# Patient Record
Sex: FEMALE | Race: WHITE | Employment: UNEMPLOYED | RURAL
[De-identification: names, ages, dates, MRNs, and addresses within clinical notes are randomized per-mention and may not be internally consistent; named-entity substitution may affect disease eponyms.]

---

## 2018-11-08 ENCOUNTER — HOSPITAL ENCOUNTER (EMERGENCY)
Facility: HOSPITAL | Age: 43
Discharge: HOME | End: 2018-11-08
Attending: FAMILY MEDICINE
Payer: COMMERCIAL

## 2018-11-08 VITALS
RESPIRATION RATE: 16 BRPM | SYSTOLIC BLOOD PRESSURE: 124 MMHG | OXYGEN SATURATION: 97 % | BODY MASS INDEX: 37.3 KG/M2 | HEART RATE: 114 BPM | WEIGHT: 190 LBS | HEIGHT: 60 IN | DIASTOLIC BLOOD PRESSURE: 78 MMHG | TEMPERATURE: 98.3 F

## 2018-11-08 DIAGNOSIS — K04.7 DENTAL ABSCESS: Primary | ICD-10-CM

## 2018-11-08 DIAGNOSIS — K02.9 DENTAL CARIES: ICD-10-CM

## 2018-11-08 PROCEDURE — 99282 EMERGENCY DEPT VISIT SF MDM: CPT

## 2018-11-08 PROCEDURE — 6370000000 HC RX 637 (ALT 250 FOR IP): Performed by: FAMILY MEDICINE

## 2018-11-08 RX ORDER — HYDROCODONE BITARTRATE AND ACETAMINOPHEN 5; 325 MG/1; MG/1
1 TABLET ORAL EVERY 4 HOURS PRN
Qty: 10 TABLET | Refills: 0 | Status: SHIPPED | OUTPATIENT
Start: 2018-11-08 | End: 2018-11-11

## 2018-11-08 RX ORDER — AMOXICILLIN 500 MG/1
500 CAPSULE ORAL 3 TIMES DAILY
Qty: 30 CAPSULE | Refills: 0 | Status: SHIPPED | OUTPATIENT
Start: 2018-11-08 | End: 2018-11-18

## 2018-11-08 RX ORDER — AMOXICILLIN 500 MG/1
500 CAPSULE ORAL ONCE
Status: COMPLETED | OUTPATIENT
Start: 2018-11-08 | End: 2018-11-08

## 2018-11-08 RX ORDER — HYDROCODONE BITARTRATE AND ACETAMINOPHEN 7.5; 325 MG/1; MG/1
1 TABLET ORAL ONCE
Status: COMPLETED | OUTPATIENT
Start: 2018-11-08 | End: 2018-11-08

## 2018-11-08 RX ORDER — IBUPROFEN 600 MG/1
600 TABLET ORAL EVERY 6 HOURS PRN
COMMUNITY
End: 2018-12-03

## 2018-11-08 RX ADMIN — HYDROCODONE BITARTRATE AND ACETAMINOPHEN 1 TABLET: 7.5; 325 TABLET ORAL at 19:50

## 2018-11-08 RX ADMIN — AMOXICILLIN 500 MG: 500 CAPSULE ORAL at 19:50

## 2018-11-08 ASSESSMENT — PAIN SCALES - GENERAL
PAINLEVEL_OUTOF10: 10

## 2018-11-08 ASSESSMENT — PAIN - FUNCTIONAL ASSESSMENT: PAIN_FUNCTIONAL_ASSESSMENT: 0-10

## 2018-11-08 ASSESSMENT — PAIN DESCRIPTION - LOCATION: LOCATION: MOUTH

## 2018-11-08 ASSESSMENT — PAIN DESCRIPTION - ORIENTATION: ORIENTATION: RIGHT

## 2018-11-08 ASSESSMENT — PAIN DESCRIPTION - PAIN TYPE: TYPE: ACUTE PAIN

## 2018-11-08 ASSESSMENT — PAIN DESCRIPTION - DESCRIPTORS: DESCRIPTORS: ACHING;THROBBING

## 2018-11-08 ASSESSMENT — ENCOUNTER SYMPTOMS: FACIAL SWELLING: 1

## 2018-11-09 NOTE — ED PROVIDER NOTES
7/19/1988    Smokeless tobacco: Never Used    Alcohol use No    Drug use: No    Sexual activity: Not Asked     Other Topics Concern    None     Social History Narrative    None       SCREENINGS             PHYSICAL EXAM    (up to 7 for level 4, 8 or more for level 5)     ED Triage Vitals [11/08/18 1857]   BP Temp Temp Source Pulse Resp SpO2 Height Weight   122/85 98.3 °F (36.8 °C) Oral 115 16 98 % 5' (1.524 m) 190 lb (86.2 kg)       Physical Exam   Constitutional: She is oriented to person, place, and time. She appears well-developed and well-nourished. HENT:   Head: Normocephalic and atraumatic. Pt with pan-dental caries. Pt with right canine deep dental decay into dentin with loss of tooth to gingiva with anterior edema and erythema, no drainage. Facial edema external to tooth decay to right maxilla. Eyes: Pupils are equal, round, and reactive to light. Conjunctivae and EOM are normal.   Neck: Neck supple. Cardiovascular: Regular rhythm and normal heart sounds. Pulmonary/Chest: Effort normal and breath sounds normal.   Musculoskeletal: Normal range of motion. Neurological: She is alert and oriented to person, place, and time. Skin: Skin is warm and dry. Psychiatric: She has a normal mood and affect. Her behavior is normal.   Nursing note and vitals reviewed.       DIAGNOSTIC RESULTS     EKG: All EKG's are interpreted by the Emergency Department Physician who either signs or Co-signs this chart in the absence of a cardiologist.    None    RADIOLOGY:   Non-plain film images such as CT, Ultrasound and MRI are read by the radiologist. Plain radiographic images are visualized andpreliminarily interpreted by the emergency physician with the below findings:    None    Interpretationper the Radiologist below, if available at the time of this note:    No orders to display         ED BEDSIDE ULTRASOUND:   Performed by ED Physician - none    LABS:  Labs Reviewed - No data to display    All other labs were within normal range or not returned as of this dictation. EMERGENCY DEPARTMENT COURSE and DIFFERENTIAL DIAGNOSIS/MDM:   Vitals:    Vitals:    11/08/18 1857   BP: 122/85   Pulse: 115   Resp: 16   Temp: 98.3 °F (36.8 °C)   TempSrc: Oral   SpO2: 98%   Weight: 190 lb (86.2 kg)   Height: 5' (1.524 m)           CRITICAL CARE TIME   Total Critical Care time was 0 minutes, excluding separatelyreportable procedures. There was a high probability ofclinically significant/life threatening deterioration in the patient's condition which required my urgent intervention. CONSULTS:  None    PROCEDURES:  None    PROGRESS NOTES:    Pt with dental decay and needs to get to a dentist. Told pt about looking online for dental insurance. Will give pain med and antibiotics now and give Rx for each upon discharge. Ekasper done. No Rx's on system. FINAL IMPRESSION      1. Dental abscess    2. Dental caries          DISPOSITION/PLAN   DISPOSITION Decision To Discharge 11/08/2018 07:41:24 PM      PATIENT REFERRED TO:    Pt to follow up with PCP or seek dental care if symptoms continue          DISCHARGE MEDICATIONS:  New Prescriptions    AMOXICILLIN (AMOXIL) 500 MG CAPSULE    Take 1 capsule by mouth 3 times daily for 10 days    HYDROCODONE-ACETAMINOPHEN (NORCO) 5-325 MG PER TABLET    Take 1 tablet by mouth every 4 hours as needed for Pain for up to 3 days. Intended supply: 3 days. Take lowest dose possible to manage pain.        (Please note that portions of this note were completed with a voice recognition program.  Efforts were made to edit the dictations but occasionallywords are mis-transcribed.)    Casimiro Primrose, DO (electronically signed)  Attending Emergency Physician          Casimiro Primrose, DO  11/08/18 1951

## 2018-12-03 ENCOUNTER — HOSPITAL ENCOUNTER (INPATIENT)
Facility: HOSPITAL | Age: 43
LOS: 4 days | Discharge: HOME | End: 2018-12-07
Attending: INTERNAL MEDICINE | Admitting: INTERNAL MEDICINE

## 2018-12-03 ENCOUNTER — HOSPITAL ENCOUNTER (EMERGENCY)
Facility: HOSPITAL | Age: 43
Discharge: ANOTHER HOSPITAL | End: 2018-12-03
Attending: EMERGENCY MEDICINE
Payer: COMMERCIAL

## 2018-12-03 ENCOUNTER — HOSPITAL ENCOUNTER (OUTPATIENT)
Facility: HOSPITAL | Age: 43
Setting detail: HOSPITAL OUTPATIENT SURGERY
Discharge: STILL ADMITTED OR ADMINISTRATIVE | End: 2018-12-03
Attending: INTERNAL MEDICINE | Admitting: INTERNAL MEDICINE

## 2018-12-03 ENCOUNTER — APPOINTMENT (OUTPATIENT)
Dept: GENERAL RADIOLOGY | Facility: HOSPITAL | Age: 43
End: 2018-12-03

## 2018-12-03 ENCOUNTER — APPOINTMENT (OUTPATIENT)
Dept: GENERAL RADIOLOGY | Facility: HOSPITAL | Age: 43
End: 2018-12-03
Payer: COMMERCIAL

## 2018-12-03 ENCOUNTER — APPOINTMENT (OUTPATIENT)
Dept: CT IMAGING | Facility: HOSPITAL | Age: 43
End: 2018-12-03
Payer: COMMERCIAL

## 2018-12-03 VITALS
OXYGEN SATURATION: 95 % | BODY MASS INDEX: 35.34 KG/M2 | RESPIRATION RATE: 30 BRPM | HEART RATE: 120 BPM | SYSTOLIC BLOOD PRESSURE: 129 MMHG | HEIGHT: 60 IN | WEIGHT: 180 LBS | DIASTOLIC BLOOD PRESSURE: 81 MMHG | TEMPERATURE: 97.3 F

## 2018-12-03 VITALS
OXYGEN SATURATION: 90 % | SYSTOLIC BLOOD PRESSURE: 123 MMHG | BODY MASS INDEX: 35.34 KG/M2 | WEIGHT: 180 LBS | HEART RATE: 123 BPM | DIASTOLIC BLOOD PRESSURE: 90 MMHG | RESPIRATION RATE: 18 BRPM | HEIGHT: 60 IN

## 2018-12-03 DIAGNOSIS — R00.0 WIDE-COMPLEX TACHYCARDIA: ICD-10-CM

## 2018-12-03 DIAGNOSIS — I21.02 STEMI INVOLVING LEFT ANTERIOR DESCENDING CORONARY ARTERY (HCC): ICD-10-CM

## 2018-12-03 DIAGNOSIS — R07.9 CHEST PAIN, UNSPECIFIED TYPE: Primary | ICD-10-CM

## 2018-12-03 DIAGNOSIS — I21.02 ACUTE ST ELEVATION MYOCARDIAL INFARCTION (STEMI) INVOLVING LEFT ANTERIOR DESCENDING (LAD) CORONARY ARTERY (HCC): Primary | ICD-10-CM

## 2018-12-03 DIAGNOSIS — F17.200 CURRENT SMOKER: ICD-10-CM

## 2018-12-03 PROBLEM — R91.8 PULMONARY NODULES: Status: ACTIVE | Noted: 2018-12-03

## 2018-12-03 PROBLEM — E66.812 CLASS 2 OBESITY IN ADULT: Status: ACTIVE | Noted: 2018-12-03

## 2018-12-03 PROBLEM — M54.50 LOW BACK PAIN: Status: ACTIVE | Noted: 2018-12-03

## 2018-12-03 PROBLEM — E03.9 HYPOTHYROIDISM: Status: ACTIVE | Noted: 2018-12-03

## 2018-12-03 PROBLEM — E66.9 CLASS 2 OBESITY IN ADULT: Status: ACTIVE | Noted: 2018-12-03

## 2018-12-03 PROBLEM — G47.33 OSA (OBSTRUCTIVE SLEEP APNEA): Status: ACTIVE | Noted: 2018-12-03

## 2018-12-03 LAB
A/G RATIO: 1.3 (ref 0.8–2)
ALBUMIN SERPL-MCNC: 4.37 G/DL (ref 3.2–4.8)
ALBUMIN SERPL-MCNC: 4.5 G/DL (ref 3.4–4.8)
ALBUMIN/GLOB SERPL: 1.7 G/DL (ref 1.5–2.5)
ALP BLD-CCNC: 94 U/L (ref 25–100)
ALP SERPL-CCNC: 91 U/L (ref 25–100)
ALT SERPL W P-5'-P-CCNC: 76 U/L (ref 7–40)
ALT SERPL-CCNC: 18 U/L (ref 4–36)
ANION GAP SERPL CALCULATED.3IONS-SCNC: 10 MMOL/L (ref 3–11)
ANION GAP SERPL CALCULATED.3IONS-SCNC: 10 MMOL/L (ref 3–11)
ANION GAP SERPL CALCULATED.3IONS-SCNC: 16 MMOL/L (ref 3–16)
AST SERPL-CCNC: 17 U/L (ref 8–33)
AST SERPL-CCNC: 292 U/L (ref 0–33)
B-HCG UR QL: NEGATIVE
BASOPHILS # BLD MANUAL: 0 10*3/MM3 (ref 0–0.2)
BASOPHILS ABSOLUTE: 0.1 K/UL (ref 0–0.1)
BASOPHILS NFR BLD AUTO: 0 % (ref 0–1)
BASOPHILS RELATIVE PERCENT: 0.4 %
BILIRUB SERPL-MCNC: 0.3 MG/DL (ref 0.3–1.2)
BILIRUB SERPL-MCNC: <0.2 MG/DL (ref 0.3–1.2)
BNP SERPL-MCNC: 42 PG/ML (ref 0–100)
BUN BLD-MCNC: 13 MG/DL (ref 9–23)
BUN BLD-MCNC: 13 MG/DL (ref 9–23)
BUN BLDV-MCNC: 9 MG/DL (ref 6–20)
BUN/CREAT SERPL: 16 (ref 7–25)
BUN/CREAT SERPL: 16 (ref 7–25)
CALCIUM SERPL-MCNC: 10 MG/DL (ref 8.5–10.5)
CALCIUM SPEC-SCNC: 8.7 MG/DL (ref 8.7–10.4)
CALCIUM SPEC-SCNC: 8.7 MG/DL (ref 8.7–10.4)
CHLORIDE BLD-SCNC: 106 MMOL/L (ref 98–107)
CHLORIDE SERPL-SCNC: 109 MMOL/L (ref 99–109)
CHLORIDE SERPL-SCNC: 109 MMOL/L (ref 99–109)
CO2 SERPL-SCNC: 16 MMOL/L (ref 20–31)
CO2 SERPL-SCNC: 16 MMOL/L (ref 20–31)
CO2: 17 MMOL/L (ref 20–30)
CREAT BLD-MCNC: 0.81 MG/DL (ref 0.6–1.3)
CREAT BLD-MCNC: 0.81 MG/DL (ref 0.6–1.3)
CREAT SERPL-MCNC: 0.8 MG/DL (ref 0.4–1.2)
D DIMER: 369 NG/ML DDU
D-LACTATE SERPL-SCNC: 3.9 MMOL/L (ref 0.5–2)
DEPRECATED RDW RBC AUTO: 48.4 FL (ref 37–54)
EOSINOPHIL # BLD MANUAL: 0 10*3/MM3 (ref 0.1–0.3)
EOSINOPHIL NFR BLD MANUAL: 0 % (ref 0–3)
EOSINOPHILS ABSOLUTE: 0.3 K/UL (ref 0–0.4)
EOSINOPHILS RELATIVE PERCENT: 1.5 %
ERYTHROCYTE [DISTWIDTH] IN BLOOD BY AUTOMATED COUNT: 14 % (ref 11.3–14.5)
GFR AFRICAN AMERICAN: >59
GFR NON-AFRICAN AMERICAN: >60
GFR SERPL CREATININE-BSD FRML MDRD: 76 ML/MIN/1.73
GFR SERPL CREATININE-BSD FRML MDRD: 76 ML/MIN/1.73
GLOBULIN UR ELPH-MCNC: 2.5 GM/DL
GLOBULIN: 3.4 G/DL
GLUCOSE BLD-MCNC: 147 MG/DL (ref 74–106)
GLUCOSE BLD-MCNC: 244 MG/DL (ref 70–100)
GLUCOSE BLD-MCNC: 244 MG/DL (ref 70–100)
GLUCOSE BLDC GLUCOMTR-MCNC: 232 MG/DL (ref 70–130)
HBA1C MFR BLD: 6.9 % (ref 4.8–5.6)
HCT VFR BLD AUTO: 44.4 % (ref 34.5–44)
HCT VFR BLD CALC: 44.1 % (ref 37–47)
HEMOGLOBIN: 14.5 G/DL (ref 11.5–16.5)
HGB BLD-MCNC: 14.6 G/DL (ref 11.5–15.5)
IMMATURE GRANULOCYTES #: 0.1 K/UL
IMMATURE GRANULOCYTES %: 0.4 % (ref 0–5)
LYMPHOCYTES # BLD MANUAL: 1.74 10*3/MM3 (ref 0.6–4.8)
LYMPHOCYTES ABSOLUTE: 5.5 K/UL (ref 1.5–4)
LYMPHOCYTES NFR BLD MANUAL: 4 % (ref 0–12)
LYMPHOCYTES NFR BLD MANUAL: 6 % (ref 24–44)
LYMPHOCYTES RELATIVE PERCENT: 33.6 %
MAGNESIUM SERPL-MCNC: 2.1 MG/DL (ref 1.3–2.7)
MAGNESIUM: 1.9 MG/DL (ref 1.7–2.4)
MCH RBC QN AUTO: 30.9 PG (ref 27–32)
MCH RBC QN AUTO: 31.2 PG (ref 27–31)
MCHC RBC AUTO-ENTMCNC: 32.9 G/DL (ref 31–35)
MCHC RBC AUTO-ENTMCNC: 32.9 G/DL (ref 32–36)
MCV RBC AUTO: 94 FL (ref 80–100)
MCV RBC AUTO: 94.9 FL (ref 80–99)
MONOCYTES # BLD AUTO: 1.16 10*3/MM3 (ref 0–1)
MONOCYTES ABSOLUTE: 1.2 K/UL (ref 0.2–0.8)
MONOCYTES RELATIVE PERCENT: 7.2 %
NEUTROPHILS # BLD AUTO: 26.06 10*3/MM3 (ref 1.5–8.3)
NEUTROPHILS ABSOLUTE: 9.3 K/UL (ref 2–7.5)
NEUTROPHILS NFR BLD MANUAL: 84 % (ref 41–71)
NEUTROPHILS RELATIVE PERCENT: 56.9 %
NEUTS BAND NFR BLD MANUAL: 6 % (ref 0–5)
PDW BLD-RTO: 13.4 % (ref 11–16)
PHOSPHATE SERPL-MCNC: 4.2 MG/DL (ref 2.4–5.1)
PLAT MORPH BLD: NORMAL
PLATELET # BLD AUTO: 469 10*3/MM3 (ref 150–450)
PLATELET # BLD: 444 K/UL (ref 150–400)
PMV BLD AUTO: 10.5 FL (ref 6–12)
PMV BLD AUTO: 9.6 FL (ref 6–10)
POTASSIUM BLD-SCNC: 4.7 MMOL/L (ref 3.5–5.5)
POTASSIUM BLD-SCNC: 4.7 MMOL/L (ref 3.5–5.5)
POTASSIUM REFLEX MAGNESIUM: 3.4 MMOL/L (ref 3.4–5.1)
PRO-BNP: 31 PG/ML (ref 0–900)
PROT SERPL-MCNC: 6.9 G/DL (ref 5.7–8.2)
RBC # BLD AUTO: 4.68 10*6/MM3 (ref 3.89–5.14)
RBC # BLD: 4.69 M/UL (ref 3.8–5.8)
RBC MORPH BLD: NORMAL
SODIUM BLD-SCNC: 135 MMOL/L (ref 132–146)
SODIUM BLD-SCNC: 135 MMOL/L (ref 132–146)
SODIUM BLD-SCNC: 139 MMOL/L (ref 136–145)
TOTAL PROTEIN: 7.9 G/DL (ref 6.4–8.3)
TROPONIN I SERPL-MCNC: >250 NG/ML
TROPONIN: <0.3 NG/ML
TROPONIN: <0.3 NG/ML
WBC # BLD: 16.4 K/UL (ref 4–11)
WBC MORPH BLD: NORMAL
WBC NRBC COR # BLD: 28.96 10*3/MM3 (ref 3.5–10.8)

## 2018-12-03 PROCEDURE — C1725 CATH, TRANSLUMIN NON-LASER: HCPCS | Performed by: INTERNAL MEDICINE

## 2018-12-03 PROCEDURE — 83735 ASSAY OF MAGNESIUM: CPT

## 2018-12-03 PROCEDURE — 83880 ASSAY OF NATRIURETIC PEPTIDE: CPT

## 2018-12-03 PROCEDURE — 36415 COLL VENOUS BLD VENIPUNCTURE: CPT

## 2018-12-03 PROCEDURE — 6360000004 HC RX CONTRAST MEDICATION: Performed by: EMERGENCY MEDICINE

## 2018-12-03 PROCEDURE — 93458 L HRT ARTERY/VENTRICLE ANGIO: CPT | Performed by: INTERNAL MEDICINE

## 2018-12-03 PROCEDURE — 82962 GLUCOSE BLOOD TEST: CPT

## 2018-12-03 PROCEDURE — 96376 TX/PRO/DX INJ SAME DRUG ADON: CPT

## 2018-12-03 PROCEDURE — C1769 GUIDE WIRE: HCPCS | Performed by: INTERNAL MEDICINE

## 2018-12-03 PROCEDURE — 83880 ASSAY OF NATRIURETIC PEPTIDE: CPT | Performed by: INTERNAL MEDICINE

## 2018-12-03 PROCEDURE — B2111ZZ FLUOROSCOPY OF MULTIPLE CORONARY ARTERIES USING LOW OSMOLAR CONTRAST: ICD-10-PCS | Performed by: INTERNAL MEDICINE

## 2018-12-03 PROCEDURE — 94799 UNLISTED PULMONARY SVC/PX: CPT

## 2018-12-03 PROCEDURE — 82805 BLOOD GASES W/O2 SATURATION: CPT

## 2018-12-03 PROCEDURE — 96367 TX/PROPH/DG ADDL SEQ IV INF: CPT

## 2018-12-03 PROCEDURE — 96375 TX/PRO/DX INJ NEW DRUG ADDON: CPT

## 2018-12-03 PROCEDURE — 71045 X-RAY EXAM CHEST 1 VIEW: CPT

## 2018-12-03 PROCEDURE — 85347 COAGULATION TIME ACTIVATED: CPT

## 2018-12-03 PROCEDURE — 2500000003 HC RX 250 WO HCPCS: Performed by: EMERGENCY MEDICINE

## 2018-12-03 PROCEDURE — 4A023N7 MEASUREMENT OF CARDIAC SAMPLING AND PRESSURE, LEFT HEART, PERCUTANEOUS APPROACH: ICD-10-PCS | Performed by: INTERNAL MEDICINE

## 2018-12-03 PROCEDURE — 80053 COMPREHEN METABOLIC PANEL: CPT

## 2018-12-03 PROCEDURE — 84484 ASSAY OF TROPONIN QUANT: CPT

## 2018-12-03 PROCEDURE — 0 IOPAMIDOL PER 1 ML: Performed by: INTERNAL MEDICINE

## 2018-12-03 PROCEDURE — C1887 CATHETER, GUIDING: HCPCS | Performed by: INTERNAL MEDICINE

## 2018-12-03 PROCEDURE — 94660 CPAP INITIATION&MGMT: CPT

## 2018-12-03 PROCEDURE — C1894 INTRO/SHEATH, NON-LASER: HCPCS | Performed by: INTERNAL MEDICINE

## 2018-12-03 PROCEDURE — B2151ZZ FLUOROSCOPY OF LEFT HEART USING LOW OSMOLAR CONTRAST: ICD-10-PCS | Performed by: INTERNAL MEDICINE

## 2018-12-03 PROCEDURE — 71275 CT ANGIOGRAPHY CHEST: CPT

## 2018-12-03 PROCEDURE — 92941 PRQ TRLML REVSC TOT OCCL AMI: CPT | Performed by: INTERNAL MEDICINE

## 2018-12-03 PROCEDURE — 3E033PZ INTRODUCTION OF PLATELET INHIBITOR INTO PERIPHERAL VEIN, PERCUTANEOUS APPROACH: ICD-10-PCS | Performed by: INTERNAL MEDICINE

## 2018-12-03 PROCEDURE — 6370000000 HC RX 637 (ALT 250 FOR IP): Performed by: EMERGENCY MEDICINE

## 2018-12-03 PROCEDURE — C9606 PERC D-E COR REVASC W AMI S: HCPCS | Performed by: INTERNAL MEDICINE

## 2018-12-03 PROCEDURE — 25010000002 HEPARIN (PORCINE) PER 1000 UNITS: Performed by: INTERNAL MEDICINE

## 2018-12-03 PROCEDURE — 25010000002 FUROSEMIDE PER 20 MG: Performed by: INTERNAL MEDICINE

## 2018-12-03 PROCEDURE — 99291 CRITICAL CARE FIRST HOUR: CPT | Performed by: INTERNAL MEDICINE

## 2018-12-03 PROCEDURE — 2500000003 HC RX 250 WO HCPCS

## 2018-12-03 PROCEDURE — 80053 COMPREHEN METABOLIC PANEL: CPT | Performed by: INTERNAL MEDICINE

## 2018-12-03 PROCEDURE — 85025 COMPLETE CBC W/AUTO DIFF WBC: CPT

## 2018-12-03 PROCEDURE — 2580000003 HC RX 258: Performed by: EMERGENCY MEDICINE

## 2018-12-03 PROCEDURE — 83735 ASSAY OF MAGNESIUM: CPT | Performed by: INTERNAL MEDICINE

## 2018-12-03 PROCEDURE — 80048 BASIC METABOLIC PNL TOTAL CA: CPT | Performed by: NURSE PRACTITIONER

## 2018-12-03 PROCEDURE — 6360000002 HC RX W HCPCS: Performed by: EMERGENCY MEDICINE

## 2018-12-03 PROCEDURE — 84484 ASSAY OF TROPONIN QUANT: CPT | Performed by: NURSE PRACTITIONER

## 2018-12-03 PROCEDURE — 93005 ELECTROCARDIOGRAM TRACING: CPT

## 2018-12-03 PROCEDURE — 93005 ELECTROCARDIOGRAM TRACING: CPT | Performed by: NURSE PRACTITIONER

## 2018-12-03 PROCEDURE — 85025 COMPLETE CBC W/AUTO DIFF WBC: CPT | Performed by: INTERNAL MEDICINE

## 2018-12-03 PROCEDURE — 25010000002 ONDANSETRON PER 1 MG: Performed by: NURSE PRACTITIONER

## 2018-12-03 PROCEDURE — 25010000002 EPTIFIBATIDE 20 MG/10ML SOLUTION: Performed by: INTERNAL MEDICINE

## 2018-12-03 PROCEDURE — 84100 ASSAY OF PHOSPHORUS: CPT | Performed by: INTERNAL MEDICINE

## 2018-12-03 PROCEDURE — 99223 1ST HOSP IP/OBS HIGH 75: CPT | Performed by: INTERNAL MEDICINE

## 2018-12-03 PROCEDURE — 83605 ASSAY OF LACTIC ACID: CPT | Performed by: INTERNAL MEDICINE

## 2018-12-03 PROCEDURE — 85007 BL SMEAR W/DIFF WBC COUNT: CPT | Performed by: INTERNAL MEDICINE

## 2018-12-03 PROCEDURE — 96365 THER/PROPH/DIAG IV INF INIT: CPT

## 2018-12-03 PROCEDURE — 027035Z DILATION OF CORONARY ARTERY, ONE ARTERY WITH TWO DRUG-ELUTING INTRALUMINAL DEVICES, PERCUTANEOUS APPROACH: ICD-10-PCS | Performed by: INTERNAL MEDICINE

## 2018-12-03 PROCEDURE — 81025 URINE PREGNANCY TEST: CPT | Performed by: NURSE PRACTITIONER

## 2018-12-03 PROCEDURE — 83036 HEMOGLOBIN GLYCOSYLATED A1C: CPT | Performed by: NURSE PRACTITIONER

## 2018-12-03 PROCEDURE — C1874 STENT, COATED/COV W/DEL SYS: HCPCS | Performed by: INTERNAL MEDICINE

## 2018-12-03 PROCEDURE — 99291 CRITICAL CARE FIRST HOUR: CPT

## 2018-12-03 PROCEDURE — 85379 FIBRIN DEGRADATION QUANT: CPT

## 2018-12-03 DEVICE — XIENCE SIERRA™ EVEROLIMUS ELUTING CORONARY STENT SYSTEM 2.50 MM X 18 MM / RAPID-EXCHANGE
Type: IMPLANTABLE DEVICE | Status: FUNCTIONAL
Brand: XIENCE SIERRA™

## 2018-12-03 RX ORDER — MORPHINE SULFATE 4 MG/ML
4 INJECTION, SOLUTION INTRAMUSCULAR; INTRAVENOUS EVERY 30 MIN PRN
Status: DISCONTINUED | OUTPATIENT
Start: 2018-12-03 | End: 2018-12-03 | Stop reason: HOSPADM

## 2018-12-03 RX ORDER — CLOPIDOGREL BISULFATE 75 MG/1
600 TABLET ORAL ONCE
Status: COMPLETED | OUTPATIENT
Start: 2018-12-03 | End: 2018-12-03

## 2018-12-03 RX ORDER — ATORVASTATIN CALCIUM 40 MG/1
40 TABLET, FILM COATED ORAL NIGHTLY
Status: DISCONTINUED | OUTPATIENT
Start: 2018-12-03 | End: 2018-12-07 | Stop reason: HOSPADM

## 2018-12-03 RX ORDER — DILTIAZEM HYDROCHLORIDE 5 MG/ML
10 INJECTION INTRAVENOUS ONCE
Status: COMPLETED | OUTPATIENT
Start: 2018-12-03 | End: 2018-12-03

## 2018-12-03 RX ORDER — METOPROLOL TARTRATE 5 MG/5ML
INJECTION INTRAVENOUS AS NEEDED
Status: DISCONTINUED | OUTPATIENT
Start: 2018-12-03 | End: 2018-12-03 | Stop reason: HOSPADM

## 2018-12-03 RX ORDER — ASPIRIN 81 MG/1
324 TABLET, CHEWABLE ORAL ONCE
Status: COMPLETED | OUTPATIENT
Start: 2018-12-03 | End: 2018-12-03

## 2018-12-03 RX ORDER — ATORVASTATIN CALCIUM 40 MG/1
40 TABLET, FILM COATED ORAL NIGHTLY
Status: DISCONTINUED | OUTPATIENT
Start: 2018-12-03 | End: 2018-12-03

## 2018-12-03 RX ORDER — NITROGLYCERIN 0.4 MG/1
0.4 TABLET SUBLINGUAL
Status: DISCONTINUED | OUTPATIENT
Start: 2018-12-03 | End: 2018-12-07 | Stop reason: HOSPADM

## 2018-12-03 RX ORDER — CLOPIDOGREL BISULFATE 75 MG/1
75 TABLET ORAL DAILY
Status: DISCONTINUED | OUTPATIENT
Start: 2018-12-04 | End: 2018-12-07 | Stop reason: HOSPADM

## 2018-12-03 RX ORDER — FAMOTIDINE 10 MG/ML
20 INJECTION, SOLUTION INTRAVENOUS EVERY 12 HOURS SCHEDULED
Status: DISCONTINUED | OUTPATIENT
Start: 2018-12-03 | End: 2018-12-05

## 2018-12-03 RX ORDER — DILTIAZEM HYDROCHLORIDE 5 MG/ML
INJECTION INTRAVENOUS
Status: COMPLETED
Start: 2018-12-03 | End: 2018-12-03

## 2018-12-03 RX ORDER — FUROSEMIDE 10 MG/ML
INJECTION INTRAMUSCULAR; INTRAVENOUS AS NEEDED
Status: DISCONTINUED | OUTPATIENT
Start: 2018-12-03 | End: 2018-12-03

## 2018-12-03 RX ORDER — ASPIRIN 81 MG/1
81 TABLET, CHEWABLE ORAL DAILY
Status: DISCONTINUED | OUTPATIENT
Start: 2018-12-03 | End: 2018-12-03

## 2018-12-03 RX ORDER — SODIUM CHLORIDE 0.9 % (FLUSH) 0.9 %
3 SYRINGE (ML) INJECTION EVERY 12 HOURS SCHEDULED
Status: DISCONTINUED | OUTPATIENT
Start: 2018-12-03 | End: 2018-12-03

## 2018-12-03 RX ORDER — NOREPINEPHRINE BIT/0.9 % NACL 8 MG/250ML
.02-.3 INFUSION BOTTLE (ML) INTRAVENOUS
Status: DISCONTINUED | OUTPATIENT
Start: 2018-12-04 | End: 2018-12-05

## 2018-12-03 RX ORDER — HEPARIN SODIUM 5000 [USP'U]/ML
5700 INJECTION, SOLUTION INTRAVENOUS; SUBCUTANEOUS ONCE
Status: COMPLETED | OUTPATIENT
Start: 2018-12-03 | End: 2018-12-03

## 2018-12-03 RX ORDER — ACETAMINOPHEN 325 MG/1
650 TABLET ORAL EVERY 4 HOURS PRN
Status: DISCONTINUED | OUTPATIENT
Start: 2018-12-03 | End: 2018-12-07 | Stop reason: HOSPADM

## 2018-12-03 RX ORDER — ASPIRIN 81 MG/1
81 TABLET, CHEWABLE ORAL DAILY
Status: DISCONTINUED | OUTPATIENT
Start: 2018-12-04 | End: 2018-12-07 | Stop reason: HOSPADM

## 2018-12-03 RX ORDER — NICOTINE 21 MG/24HR
1 PATCH, TRANSDERMAL 24 HOURS TRANSDERMAL
Status: DISCONTINUED | OUTPATIENT
Start: 2018-12-03 | End: 2018-12-03

## 2018-12-03 RX ORDER — IPRATROPIUM BROMIDE AND ALBUTEROL SULFATE 2.5; .5 MG/3ML; MG/3ML
3 SOLUTION RESPIRATORY (INHALATION) EVERY 4 HOURS PRN
Status: DISCONTINUED | OUTPATIENT
Start: 2018-12-03 | End: 2018-12-07 | Stop reason: HOSPADM

## 2018-12-03 RX ORDER — EPTIFIBATIDE 2 MG/ML
INJECTION, SOLUTION INTRAVENOUS AS NEEDED
Status: DISCONTINUED | OUTPATIENT
Start: 2018-12-03 | End: 2018-12-03

## 2018-12-03 RX ORDER — NITROGLYCERIN 0.4 MG/1
0.4 TABLET SUBLINGUAL EVERY 5 MIN PRN
Status: DISCONTINUED | OUTPATIENT
Start: 2018-12-03 | End: 2018-12-03 | Stop reason: HOSPADM

## 2018-12-03 RX ORDER — ALPRAZOLAM 0.25 MG/1
0.25 TABLET ORAL EVERY 6 HOURS PRN
Status: DISCONTINUED | OUTPATIENT
Start: 2018-12-03 | End: 2018-12-03

## 2018-12-03 RX ORDER — DEXTROSE MONOHYDRATE 50 MG/ML
INJECTION, SOLUTION INTRAVENOUS
Status: DISCONTINUED
Start: 2018-12-03 | End: 2018-12-03 | Stop reason: HOSPADM

## 2018-12-03 RX ORDER — LISINOPRIL 2.5 MG/1
2.5 TABLET ORAL
Status: DISCONTINUED | OUTPATIENT
Start: 2018-12-03 | End: 2018-12-03

## 2018-12-03 RX ORDER — 0.9 % SODIUM CHLORIDE 0.9 %
1000 INTRAVENOUS SOLUTION INTRAVENOUS ONCE
Status: COMPLETED | OUTPATIENT
Start: 2018-12-03 | End: 2018-12-03

## 2018-12-03 RX ORDER — HEPARIN SODIUM 1000 [USP'U]/ML
INJECTION, SOLUTION INTRAVENOUS; SUBCUTANEOUS AS NEEDED
Status: DISCONTINUED | OUTPATIENT
Start: 2018-12-03 | End: 2018-12-03 | Stop reason: HOSPADM

## 2018-12-03 RX ORDER — ACETAMINOPHEN 650 MG/1
650 SUPPOSITORY RECTAL EVERY 4 HOURS PRN
Status: DISCONTINUED | OUTPATIENT
Start: 2018-12-03 | End: 2018-12-03

## 2018-12-03 RX ORDER — ACETAMINOPHEN 650 MG/1
650 SUPPOSITORY RECTAL EVERY 4 HOURS PRN
Status: DISCONTINUED | OUTPATIENT
Start: 2018-12-03 | End: 2018-12-07 | Stop reason: HOSPADM

## 2018-12-03 RX ORDER — LISINOPRIL 2.5 MG/1
2.5 TABLET ORAL
Status: DISCONTINUED | OUTPATIENT
Start: 2018-12-04 | End: 2018-12-06

## 2018-12-03 RX ORDER — HEPARIN SODIUM 5000 [USP'U]/ML
70 INJECTION, SOLUTION INTRAVENOUS; SUBCUTANEOUS ONCE
Status: DISCONTINUED | OUTPATIENT
Start: 2018-12-03 | End: 2018-12-03

## 2018-12-03 RX ORDER — FUROSEMIDE 10 MG/ML
INJECTION INTRAMUSCULAR; INTRAVENOUS AS NEEDED
Status: DISCONTINUED | OUTPATIENT
Start: 2018-12-03 | End: 2018-12-03 | Stop reason: HOSPADM

## 2018-12-03 RX ORDER — ONDANSETRON 2 MG/ML
4 INJECTION INTRAMUSCULAR; INTRAVENOUS EVERY 6 HOURS PRN
Status: DISCONTINUED | OUTPATIENT
Start: 2018-12-03 | End: 2018-12-03

## 2018-12-03 RX ORDER — ACETAMINOPHEN 325 MG/1
650 TABLET ORAL EVERY 4 HOURS PRN
Status: DISCONTINUED | OUTPATIENT
Start: 2018-12-03 | End: 2018-12-03

## 2018-12-03 RX ORDER — SODIUM CHLORIDE 0.9 % (FLUSH) 0.9 %
3-10 SYRINGE (ML) INJECTION AS NEEDED
Status: DISCONTINUED | OUTPATIENT
Start: 2018-12-03 | End: 2018-12-03

## 2018-12-03 RX ORDER — CLOPIDOGREL BISULFATE 75 MG/1
75 TABLET ORAL DAILY
Status: DISCONTINUED | OUTPATIENT
Start: 2018-12-03 | End: 2018-12-03

## 2018-12-03 RX ORDER — SODIUM CHLORIDE 0.9 % (FLUSH) 0.9 %
3-10 SYRINGE (ML) INJECTION AS NEEDED
Status: DISCONTINUED | OUTPATIENT
Start: 2018-12-03 | End: 2018-12-07 | Stop reason: HOSPADM

## 2018-12-03 RX ORDER — AMIODARONE HYDROCHLORIDE 50 MG/ML
300 INJECTION, SOLUTION INTRAVENOUS ONCE
Status: COMPLETED | OUTPATIENT
Start: 2018-12-03 | End: 2018-12-03

## 2018-12-03 RX ORDER — ONDANSETRON 2 MG/ML
4 INJECTION INTRAMUSCULAR; INTRAVENOUS EVERY 6 HOURS PRN
Status: DISCONTINUED | OUTPATIENT
Start: 2018-12-03 | End: 2018-12-07 | Stop reason: HOSPADM

## 2018-12-03 RX ORDER — SODIUM CHLORIDE 0.9 % (FLUSH) 0.9 %
3 SYRINGE (ML) INJECTION EVERY 12 HOURS SCHEDULED
Status: DISCONTINUED | OUTPATIENT
Start: 2018-12-03 | End: 2018-12-07 | Stop reason: HOSPADM

## 2018-12-03 RX ORDER — FAMOTIDINE 10 MG/ML
20 INJECTION, SOLUTION INTRAVENOUS EVERY 12 HOURS SCHEDULED
Status: CANCELLED | OUTPATIENT
Start: 2018-12-03

## 2018-12-03 RX ORDER — LIDOCAINE HYDROCHLORIDE 10 MG/ML
INJECTION, SOLUTION INFILTRATION; PERINEURAL AS NEEDED
Status: DISCONTINUED | OUTPATIENT
Start: 2018-12-03 | End: 2018-12-03

## 2018-12-03 RX ORDER — ONDANSETRON 2 MG/ML
4 INJECTION INTRAMUSCULAR; INTRAVENOUS EVERY 30 MIN PRN
Status: DISCONTINUED | OUTPATIENT
Start: 2018-12-03 | End: 2018-12-03 | Stop reason: HOSPADM

## 2018-12-03 RX ORDER — IPRATROPIUM BROMIDE AND ALBUTEROL SULFATE 2.5; .5 MG/3ML; MG/3ML
3 SOLUTION RESPIRATORY (INHALATION) EVERY 4 HOURS PRN
Status: DISCONTINUED | OUTPATIENT
Start: 2018-12-03 | End: 2018-12-03

## 2018-12-03 RX ORDER — MAGNESIUM SULFATE IN WATER 40 MG/ML
2 INJECTION, SOLUTION INTRAVENOUS ONCE
Status: COMPLETED | OUTPATIENT
Start: 2018-12-03 | End: 2018-12-03

## 2018-12-03 RX ORDER — HEPARIN SODIUM 1000 [USP'U]/ML
INJECTION, SOLUTION INTRAVENOUS; SUBCUTANEOUS AS NEEDED
Status: DISCONTINUED | OUTPATIENT
Start: 2018-12-03 | End: 2018-12-03

## 2018-12-03 RX ORDER — ALPRAZOLAM 0.25 MG/1
0.25 TABLET ORAL EVERY 6 HOURS PRN
Status: DISCONTINUED | OUTPATIENT
Start: 2018-12-03 | End: 2018-12-07 | Stop reason: HOSPADM

## 2018-12-03 RX ORDER — FUROSEMIDE 10 MG/ML
40 INJECTION INTRAMUSCULAR; INTRAVENOUS ONCE
Status: DISCONTINUED | OUTPATIENT
Start: 2018-12-03 | End: 2018-12-03

## 2018-12-03 RX ORDER — AMIODARONE HYDROCHLORIDE 50 MG/ML
150 INJECTION, SOLUTION INTRAVENOUS ONCE
Status: COMPLETED | OUTPATIENT
Start: 2018-12-03 | End: 2018-12-03

## 2018-12-03 RX ORDER — EPTIFIBATIDE 2 MG/ML
INJECTION, SOLUTION INTRAVENOUS AS NEEDED
Status: DISCONTINUED | OUTPATIENT
Start: 2018-12-03 | End: 2018-12-03 | Stop reason: HOSPADM

## 2018-12-03 RX ORDER — LIDOCAINE HYDROCHLORIDE 10 MG/ML
INJECTION, SOLUTION INFILTRATION; PERINEURAL AS NEEDED
Status: DISCONTINUED | OUTPATIENT
Start: 2018-12-03 | End: 2018-12-03 | Stop reason: HOSPADM

## 2018-12-03 RX ADMIN — DILTIAZEM HYDROCHLORIDE 10 MG: 5 INJECTION INTRAVENOUS at 14:24

## 2018-12-03 RX ADMIN — AMIODARONE HYDROCHLORIDE 150 MG: 50 INJECTION, SOLUTION INTRAVENOUS at 16:39

## 2018-12-03 RX ADMIN — DILTIAZEM HYDROCHLORIDE 10 MG: 5 INJECTION INTRAVENOUS at 14:30

## 2018-12-03 RX ADMIN — MORPHINE SULFATE 4 MG: 4 INJECTION INTRAVENOUS at 15:42

## 2018-12-03 RX ADMIN — ONDANSETRON 4 MG: 2 INJECTION, SOLUTION INTRAMUSCULAR; INTRAVENOUS at 14:41

## 2018-12-03 RX ADMIN — AMIODARONE HYDROCHLORIDE 300 MG: 50 INJECTION, SOLUTION INTRAVENOUS at 15:15

## 2018-12-03 RX ADMIN — Medication 0.4 MG: at 16:57

## 2018-12-03 RX ADMIN — ONDANSETRON 4 MG: 2 INJECTION, SOLUTION INTRAMUSCULAR; INTRAVENOUS at 15:42

## 2018-12-03 RX ADMIN — IOPAMIDOL 100 ML: 755 INJECTION, SOLUTION INTRAVENOUS at 16:15

## 2018-12-03 RX ADMIN — MORPHINE SULFATE 4 MG: 4 INJECTION INTRAVENOUS at 14:41

## 2018-12-03 RX ADMIN — SODIUM CHLORIDE, PRESERVATIVE FREE 3 ML: 5 INJECTION INTRAVENOUS at 21:53

## 2018-12-03 RX ADMIN — NITROGLYCERIN 0.4 MG: 0.4 TABLET SUBLINGUAL at 21:20

## 2018-12-03 RX ADMIN — DILTIAZEM HYDROCHLORIDE 5 MG/HR: 5 INJECTION INTRAVENOUS at 14:32

## 2018-12-03 RX ADMIN — ASPIRIN 81 MG 324 MG: 81 TABLET ORAL at 14:28

## 2018-12-03 RX ADMIN — AMIODARONE HYDROCHLORIDE 0.5 MG/MIN: 1.8 INJECTION, SOLUTION INTRAVENOUS at 15:52

## 2018-12-03 RX ADMIN — FAMOTIDINE 20 MG: 10 INJECTION, SOLUTION INTRAVENOUS at 21:41

## 2018-12-03 RX ADMIN — CALCIUM GLUCONATE 1 G: 98 INJECTION, SOLUTION INTRAVENOUS at 15:33

## 2018-12-03 RX ADMIN — MAGNESIUM SULFATE HEPTAHYDRATE 2 G: 40 INJECTION, SOLUTION INTRAVENOUS at 15:13

## 2018-12-03 RX ADMIN — AMIODARONE HYDROCHLORIDE 1 MG/MIN: 1.8 INJECTION, SOLUTION INTRAVENOUS at 16:51

## 2018-12-03 RX ADMIN — CLOPIDOGREL BISULFATE 600 MG: 75 TABLET, FILM COATED ORAL at 17:04

## 2018-12-03 RX ADMIN — SODIUM CHLORIDE 1000 ML: 9 INJECTION, SOLUTION INTRAVENOUS at 14:41

## 2018-12-03 RX ADMIN — ONDANSETRON 4 MG: 2 INJECTION INTRAMUSCULAR; INTRAVENOUS at 21:59

## 2018-12-03 RX ADMIN — HEPARIN SODIUM 5700 UNITS: 5000 INJECTION INTRAVENOUS; SUBCUTANEOUS at 17:14

## 2018-12-03 ASSESSMENT — PAIN DESCRIPTION - LOCATION: LOCATION: CHEST

## 2018-12-03 ASSESSMENT — PAIN DESCRIPTION - FREQUENCY: FREQUENCY: CONTINUOUS

## 2018-12-03 ASSESSMENT — PAIN DESCRIPTION - DESCRIPTORS: DESCRIPTORS: SHARP

## 2018-12-03 ASSESSMENT — PAIN DESCRIPTION - PAIN TYPE: TYPE: ACUTE PAIN

## 2018-12-03 ASSESSMENT — PAIN DESCRIPTION - PROGRESSION: CLINICAL_PROGRESSION: NOT CHANGED

## 2018-12-03 ASSESSMENT — PAIN SCALES - GENERAL
PAINLEVEL_OUTOF10: 10
PAINLEVEL_OUTOF10: 8

## 2018-12-03 ASSESSMENT — PAIN DESCRIPTION - ONSET: ONSET: SUDDEN

## 2018-12-03 ASSESSMENT — PAIN DESCRIPTION - ORIENTATION: ORIENTATION: MID

## 2018-12-03 NOTE — ED PROVIDER NOTES
Procedure Laterality Date    TUBAL LIGATION           CURRENT MEDICATIONS       Previous Medications    No medications on file       ALLERGIES     Patient has no known allergies. FAMILY HISTORY       Family History   Problem Relation Age of Onset    Cancer Mother     Heart Disease Mother           SOCIAL HISTORY       Social History     Social History    Marital status:      Spouse name: N/A    Number of children: N/A    Years of education: N/A     Social History Main Topics    Smoking status: Current Every Day Smoker     Packs/day: 1.00     Types: Cigarettes     Start date: 7/19/1986    Smokeless tobacco: Never Used    Alcohol use No    Drug use: No    Sexual activity: Not Asked     Other Topics Concern    None     Social History Narrative    None         PHYSICAL EXAM    (up to 7 for level 4, 8 or more for level 5)     ED Triage Vitals   BP Temp Temp src Pulse Resp SpO2 Height Weight   -- -- -- -- -- -- -- --       Physical Exam  General :Patient is awake, alert, does have acute pain in her chest, is flushed, but alert and answering questions properly. HEENT: Pupils are equally round and reactive to light, EOMI, conjunctivae clear, sclerae white, there is no injection no icterus. Oral mucosa ismoist, no exudate. Uvula is midline  Neck: Neck is supple, full range of motion, trachea midline  Cardiac: Heart tachycardic rate 120-130 bpm, no murmurs, rubs, or gallops  Lungs: Lungs are clear to auscultation, there is no wheezing, rhonchi, or rales. There is no use of accessory muscles. Chest wall: There is tenderness to palpation of the right anterior chest wall, no flail chest.  Abdomen: Abdomen is soft, nontender, nondistended. There is no firm or pulsatile masses, no rebound rigidity or guarding. Musculoskeletal: 5 out of 5 strength in all 4 extremities. No focal muscle deficits are appreciated  Neuro:  Motor intact, sensory intact, level of consciousness is normal,GCS 15  Dermatology: that may be inappropriate. If there are any questions or concerns please feel free to contact the dictating providerfor clarification.     Keyla Greenberg DO  Attending Emergency Physician              Keyla Greenberg DO  12/03/18 7384

## 2018-12-03 NOTE — ED NOTES
1635 - O2 sat noted to be 77% on venti 50% fiO2. Patient placed on NRB @ 15L MD at bedside.      Ariel Brooke RN  12/03/18 2869

## 2018-12-03 NOTE — ED NOTES
1600 - Patient reports is unable to provide urine specimen at this time.      Anahy Schneider RN  12/03/18 2993

## 2018-12-03 NOTE — ED NOTES
Placed call to [facility] Code AMI Team for Dr. Jaelyn Fagan to speak with cardiology concerning patient. Dr. Jaelyn Fagan spoke with Dr. Isabelle Santos concerning patient. She accepted patient for transfer to [facility] to cath lab.      Marisol Su  12/03/18 1201

## 2018-12-03 NOTE — ED TRIAGE NOTES
Patient c/o sudden onset of chest pain that began approximately 20 minutes PTA. Patient reports she was cleaning the house when pain began. Patient reports one episode of vomiting PTA.   Patient diaphoretic on arrival.

## 2018-12-04 PROBLEM — E78.5 HYPERLIPIDEMIA LDL GOAL <70: Status: ACTIVE | Noted: 2018-12-04

## 2018-12-04 LAB
ACT BLD: 142 SECONDS (ref 82–152)
ACT BLD: 153 SECONDS (ref 82–152)
ACT BLD: 467 SECONDS (ref 82–152)
ANION GAP SERPL CALCULATED.3IONS-SCNC: 10 MMOL/L (ref 3–11)
ARTERIAL PATENCY WRIST A: ABNORMAL
ARTICHOKE IGE QN: 199 MG/DL (ref 0–130)
ATMOSPHERIC PRESS: ABNORMAL MMHG
BASE EXCESS BLDA CALC-SCNC: -8.8 MMOL/L (ref 0–2)
BDY SITE: ABNORMAL
BODY TEMPERATURE: 37 C
BUN BLD-MCNC: 14 MG/DL (ref 9–23)
BUN/CREAT SERPL: 17.5 (ref 7–25)
CALCIUM SPEC-SCNC: 8.8 MG/DL (ref 8.7–10.4)
CHLORIDE SERPL-SCNC: 106 MMOL/L (ref 99–109)
CHOLEST SERPL-MCNC: 254 MG/DL (ref 0–200)
CO2 BLDA-SCNC: 18.1 MMOL/L (ref 23–27)
CO2 SERPL-SCNC: 19 MMOL/L (ref 20–31)
COHGB MFR BLD: 1.2 % (ref 0–2)
CREAT BLD-MCNC: 0.8 MG/DL (ref 0.6–1.3)
D-LACTATE SERPL-SCNC: 1.6 MMOL/L (ref 0.5–2)
D-LACTATE SERPL-SCNC: 4.1 MMOL/L (ref 0.5–2)
DEPRECATED RDW RBC AUTO: 48.6 FL (ref 37–54)
ERYTHROCYTE [DISTWIDTH] IN BLOOD BY AUTOMATED COUNT: 14 % (ref 11.3–14.5)
GFR SERPL CREATININE-BSD FRML MDRD: 78 ML/MIN/1.73
GLUCOSE BLD-MCNC: 213 MG/DL (ref 70–100)
GLUCOSE BLDC GLUCOMTR-MCNC: 133 MG/DL (ref 70–130)
GLUCOSE BLDC GLUCOMTR-MCNC: 147 MG/DL (ref 70–130)
GLUCOSE BLDC GLUCOMTR-MCNC: 172 MG/DL (ref 70–130)
GLUCOSE BLDC GLUCOMTR-MCNC: 189 MG/DL (ref 70–130)
HCO3 BLDA-SCNC: 17 MMOL/L (ref 20–26)
HCT VFR BLD AUTO: 38.6 % (ref 34.5–44)
HCT VFR BLD AUTO: 42.3 % (ref 34.5–44)
HCT VFR BLD CALC: 44.6 %
HDLC SERPL-MCNC: 30 MG/DL (ref 40–60)
HGB BLD-MCNC: 12.4 G/DL (ref 11.5–15.5)
HGB BLD-MCNC: 13.9 G/DL (ref 11.5–15.5)
HGB BLDA-MCNC: 14.5 G/DL (ref 14–18)
HOLD SPECIMEN: NORMAL
HOROWITZ INDEX BLD+IHG-RTO: 100 %
MAGNESIUM SERPL-MCNC: 1.9 MG/DL (ref 1.3–2.7)
MCH RBC QN AUTO: 31.4 PG (ref 27–31)
MCHC RBC AUTO-ENTMCNC: 32.9 G/DL (ref 32–36)
MCV RBC AUTO: 95.5 FL (ref 80–99)
METHGB BLD QL: 0.6 % (ref 0–1.5)
MODALITY: ABNORMAL
NOTE: ABNORMAL
OXYHGB MFR BLDV: 97.4 % (ref 94–99)
PCO2 BLDA: 36 MM HG (ref 35–45)
PCO2 TEMP ADJ BLD: 36 MM HG (ref 35–45)
PH BLDA: 7.28 PH UNITS (ref 7.35–7.45)
PH, TEMP CORRECTED: 7.28 PH UNITS
PHOSPHATE SERPL-MCNC: 2.2 MG/DL (ref 2.4–5.1)
PHOSPHATE SERPL-MCNC: 3.2 MG/DL (ref 2.4–5.1)
PLATELET # BLD AUTO: 389 10*3/MM3 (ref 150–450)
PMV BLD AUTO: 10.1 FL (ref 6–12)
PO2 BLDA: 140 MM HG (ref 83–108)
PO2 TEMP ADJ BLD: 140 MM HG (ref 83–108)
POTASSIUM BLD-SCNC: 4 MMOL/L (ref 3.5–5.5)
RBC # BLD AUTO: 4.43 10*6/MM3 (ref 3.89–5.14)
SODIUM BLD-SCNC: 135 MMOL/L (ref 132–146)
TRIGL SERPL-MCNC: 230 MG/DL (ref 0–150)
TROPONIN I SERPL-MCNC: >250 NG/ML
TSH SERPL DL<=0.05 MIU/L-ACNC: 0.83 MIU/ML (ref 0.35–5.35)
VENTILATOR MODE: ABNORMAL
WBC NRBC COR # BLD: 24.52 10*3/MM3 (ref 3.5–10.8)

## 2018-12-04 PROCEDURE — 83605 ASSAY OF LACTIC ACID: CPT | Performed by: INTERNAL MEDICINE

## 2018-12-04 PROCEDURE — 25010000002 FUROSEMIDE PER 20 MG: Performed by: INTERNAL MEDICINE

## 2018-12-04 PROCEDURE — 80061 LIPID PANEL: CPT | Performed by: NURSE PRACTITIONER

## 2018-12-04 PROCEDURE — 83605 ASSAY OF LACTIC ACID: CPT | Performed by: NURSE PRACTITIONER

## 2018-12-04 PROCEDURE — 93005 ELECTROCARDIOGRAM TRACING: CPT | Performed by: NURSE PRACTITIONER

## 2018-12-04 PROCEDURE — 94799 UNLISTED PULMONARY SVC/PX: CPT

## 2018-12-04 PROCEDURE — 63710000001 INSULIN LISPRO (HUMAN) PER 5 UNITS: Performed by: NURSE PRACTITIONER

## 2018-12-04 PROCEDURE — 85018 HEMOGLOBIN: CPT | Performed by: NURSE PRACTITIONER

## 2018-12-04 PROCEDURE — 99232 SBSQ HOSP IP/OBS MODERATE 35: CPT | Performed by: INTERNAL MEDICINE

## 2018-12-04 PROCEDURE — 80048 BASIC METABOLIC PNL TOTAL CA: CPT | Performed by: NURSE PRACTITIONER

## 2018-12-04 PROCEDURE — 84100 ASSAY OF PHOSPHORUS: CPT | Performed by: NURSE PRACTITIONER

## 2018-12-04 PROCEDURE — 85027 COMPLETE CBC AUTOMATED: CPT | Performed by: NURSE PRACTITIONER

## 2018-12-04 PROCEDURE — 84484 ASSAY OF TROPONIN QUANT: CPT | Performed by: NURSE PRACTITIONER

## 2018-12-04 PROCEDURE — 25010000002 ONDANSETRON PER 1 MG: Performed by: NURSE PRACTITIONER

## 2018-12-04 PROCEDURE — 85347 COAGULATION TIME ACTIVATED: CPT

## 2018-12-04 PROCEDURE — 93010 ELECTROCARDIOGRAM REPORT: CPT | Performed by: INTERNAL MEDICINE

## 2018-12-04 PROCEDURE — 82962 GLUCOSE BLOOD TEST: CPT

## 2018-12-04 PROCEDURE — 83735 ASSAY OF MAGNESIUM: CPT | Performed by: NURSE PRACTITIONER

## 2018-12-04 PROCEDURE — 99233 SBSQ HOSP IP/OBS HIGH 50: CPT | Performed by: INTERNAL MEDICINE

## 2018-12-04 PROCEDURE — 85014 HEMATOCRIT: CPT | Performed by: NURSE PRACTITIONER

## 2018-12-04 PROCEDURE — 84443 ASSAY THYROID STIM HORMONE: CPT | Performed by: NURSE PRACTITIONER

## 2018-12-04 PROCEDURE — 25010000002 MORPHINE SULFATE (PF) 2 MG/ML SOLUTION: Performed by: INTERNAL MEDICINE

## 2018-12-04 RX ORDER — NICOTINE 21 MG/24HR
1 PATCH, TRANSDERMAL 24 HOURS TRANSDERMAL
Status: DISCONTINUED | OUTPATIENT
Start: 2018-12-04 | End: 2018-12-07 | Stop reason: HOSPADM

## 2018-12-04 RX ORDER — MAGNESIUM SULFATE HEPTAHYDRATE 40 MG/ML
4 INJECTION, SOLUTION INTRAVENOUS AS NEEDED
Status: DISCONTINUED | OUTPATIENT
Start: 2018-12-04 | End: 2018-12-07 | Stop reason: HOSPADM

## 2018-12-04 RX ORDER — ACETAMINOPHEN 500 MG
1000 TABLET ORAL EVERY 6 HOURS PRN
COMMUNITY

## 2018-12-04 RX ORDER — MAGNESIUM SULFATE HEPTAHYDRATE 40 MG/ML
2 INJECTION, SOLUTION INTRAVENOUS AS NEEDED
Status: DISCONTINUED | OUTPATIENT
Start: 2018-12-04 | End: 2018-12-07 | Stop reason: HOSPADM

## 2018-12-04 RX ORDER — FUROSEMIDE 10 MG/ML
20 INJECTION INTRAMUSCULAR; INTRAVENOUS ONCE
Status: COMPLETED | OUTPATIENT
Start: 2018-12-04 | End: 2018-12-04

## 2018-12-04 RX ORDER — MORPHINE SULFATE 2 MG/ML
2 INJECTION, SOLUTION INTRAMUSCULAR; INTRAVENOUS
Status: DISCONTINUED | OUTPATIENT
Start: 2018-12-04 | End: 2018-12-07 | Stop reason: HOSPADM

## 2018-12-04 RX ORDER — SODIUM CHLORIDE, SODIUM LACTATE, POTASSIUM CHLORIDE, CALCIUM CHLORIDE 600; 310; 30; 20 MG/100ML; MG/100ML; MG/100ML; MG/100ML
50 INJECTION, SOLUTION INTRAVENOUS CONTINUOUS
Status: DISCONTINUED | OUTPATIENT
Start: 2018-12-04 | End: 2018-12-05

## 2018-12-04 RX ORDER — MORPHINE SULFATE 2 MG/ML
2 INJECTION, SOLUTION INTRAMUSCULAR; INTRAVENOUS EVERY 4 HOURS PRN
Status: DISCONTINUED | OUTPATIENT
Start: 2018-12-04 | End: 2018-12-04

## 2018-12-04 RX ORDER — PANTOPRAZOLE SODIUM 40 MG/10ML
40 INJECTION, POWDER, LYOPHILIZED, FOR SOLUTION INTRAVENOUS EVERY 12 HOURS SCHEDULED
Status: DISCONTINUED | OUTPATIENT
Start: 2018-12-04 | End: 2018-12-05

## 2018-12-04 RX ADMIN — NICOTINE 1 PATCH: 21 PATCH, EXTENDED RELEASE TRANSDERMAL at 17:46

## 2018-12-04 RX ADMIN — MAGNESIUM SULFATE IN WATER 4 G: 40 INJECTION, SOLUTION INTRAVENOUS at 08:36

## 2018-12-04 RX ADMIN — NITROGLYCERIN 0.4 MG: 0.4 TABLET SUBLINGUAL at 02:41

## 2018-12-04 RX ADMIN — SODIUM CHLORIDE, POTASSIUM CHLORIDE, SODIUM LACTATE AND CALCIUM CHLORIDE 50 ML/HR: 600; 310; 30; 20 INJECTION, SOLUTION INTRAVENOUS at 05:10

## 2018-12-04 RX ADMIN — MORPHINE SULFATE 2 MG: 2 INJECTION, SOLUTION INTRAMUSCULAR; INTRAVENOUS at 22:23

## 2018-12-04 RX ADMIN — ONDANSETRON 4 MG: 2 INJECTION INTRAMUSCULAR; INTRAVENOUS at 02:55

## 2018-12-04 RX ADMIN — ASPIRIN 81 MG 81 MG: 81 TABLET ORAL at 08:41

## 2018-12-04 RX ADMIN — ONDANSETRON 4 MG: 2 INJECTION INTRAMUSCULAR; INTRAVENOUS at 15:58

## 2018-12-04 RX ADMIN — ONDANSETRON 4 MG: 2 INJECTION INTRAMUSCULAR; INTRAVENOUS at 21:54

## 2018-12-04 RX ADMIN — MORPHINE SULFATE 2 MG: 2 INJECTION, SOLUTION INTRAMUSCULAR; INTRAVENOUS at 11:07

## 2018-12-04 RX ADMIN — FUROSEMIDE 20 MG: 10 INJECTION, SOLUTION INTRAMUSCULAR; INTRAVENOUS at 08:42

## 2018-12-04 RX ADMIN — LISINOPRIL 2.5 MG: 2.5 TABLET ORAL at 11:06

## 2018-12-04 RX ADMIN — ONDANSETRON 4 MG: 2 INJECTION INTRAMUSCULAR; INTRAVENOUS at 08:39

## 2018-12-04 RX ADMIN — SODIUM BICARBONATE 50 MEQ: 84 INJECTION INTRAVENOUS at 01:07

## 2018-12-04 RX ADMIN — MORPHINE SULFATE 2 MG: 2 INJECTION, SOLUTION INTRAMUSCULAR; INTRAVENOUS at 08:52

## 2018-12-04 RX ADMIN — INSULIN LISPRO 2 UNITS: 100 INJECTION, SOLUTION INTRAVENOUS; SUBCUTANEOUS at 20:27

## 2018-12-04 RX ADMIN — POTASSIUM PHOSPHATE, MONOBASIC AND POTASSIUM PHOSPHATE, DIBASIC 15 MMOL: 224; 236 INJECTION, SOLUTION, CONCENTRATE INTRAVENOUS at 08:36

## 2018-12-04 RX ADMIN — ATORVASTATIN CALCIUM 40 MG: 40 TABLET, FILM COATED ORAL at 20:15

## 2018-12-04 RX ADMIN — NITROGLYCERIN 0.4 MG: 0.4 TABLET SUBLINGUAL at 20:13

## 2018-12-04 RX ADMIN — FAMOTIDINE 20 MG: 10 INJECTION, SOLUTION INTRAVENOUS at 20:15

## 2018-12-04 RX ADMIN — FAMOTIDINE 20 MG: 10 INJECTION, SOLUTION INTRAVENOUS at 08:37

## 2018-12-04 RX ADMIN — CLOPIDOGREL BISULFATE 75 MG: 75 TABLET ORAL at 13:54

## 2018-12-04 RX ADMIN — PANTOPRAZOLE SODIUM 40 MG: 40 INJECTION, POWDER, FOR SOLUTION INTRAVENOUS at 20:15

## 2018-12-04 RX ADMIN — MORPHINE SULFATE 2 MG: 2 INJECTION, SOLUTION INTRAMUSCULAR; INTRAVENOUS at 15:50

## 2018-12-04 RX ADMIN — SODIUM CHLORIDE, PRESERVATIVE FREE 3 ML: 5 INJECTION INTRAVENOUS at 20:17

## 2018-12-04 RX ADMIN — MORPHINE SULFATE 2 MG: 2 INJECTION, SOLUTION INTRAMUSCULAR; INTRAVENOUS at 04:40

## 2018-12-04 RX ADMIN — METOPROLOL TARTRATE 12.5 MG: 25 TABLET, FILM COATED ORAL at 08:41

## 2018-12-04 RX ADMIN — NITROGLYCERIN 0.4 MG: 0.4 TABLET SUBLINGUAL at 03:14

## 2018-12-04 RX ADMIN — ALPRAZOLAM 0.25 MG: 0.25 TABLET ORAL at 03:32

## 2018-12-04 NOTE — H&P
[facility] Consult Note      Referring Provider: Elen Boothe,*  Primary Provider:  Provider, No Known  Reason for Consultation: Acute STEMI    Patient Care Team:  Provider, No Known as PCP - General    Chief complaint:  Acute STEMI    Identification:  43 year old female     Problem list:    1.  Acute STEMI 12/3/18  2.  Tobacco abuse:  2 PPD        Allergies:  Patient has no known allergies.    Home/Current Medications:    No home medications      History of present illness:    Patient is a 43-year-old female with the above-noted medical history was transferred from [facility] emergency department [facility] for acute STEMI.  She apparently developed an acute onset of sharp chest pain while cleaning her house.  There was associated nausea and vomiting as well as shortness of breath.  Brief VT was reported by EMS and she arrived on an amiodarone drip from outlying facility.  EKG revealed 6mm ST elevation in leads V2 through V4.  Prior to transfer, she received morphine 4 mg ×2, amiodarone bolus and drip, aspirin 324 mg, diltiazem 10 mg IV, Plavix 600 mg by mouth and one sublingual nitroglycerin 0.4 mg    Emergent cardiac catheterization was performed.    Cardiac Risk Factors:  Tobacco abuse, sedentary lifestyle, diabetes, family history    Cardiac Testing:  None    Social History:  Social History     Socioeconomic History   • Marital status:      Spouse name: Not on file   • Number of children: Not on file   • Years of education: Not on file   • Highest education level: Not on file   Social Needs   • Financial resource strain: Not on file   • Food insecurity - worry: Not on file   • Food insecurity - inability: Not on file   • Transportation needs - medical: Not on file   • Transportation needs - non-medical: Not on file   Occupational History   • Not on file   Tobacco Use   • Smoking status: Current Every Day Smoker     Packs/day: 2.00   • Smokeless tobacco: Never  "Used   Substance and Sexual Activity   • Alcohol use: Not on file   • Drug use: No   • Sexual activity: Defer   Other Topics Concern   • Not on file   Social History Narrative   • Not on file     Family History:  Family History   Problem Relation Age of Onset   • Heart disease Mother    • Heart disease Father      Review of Systems  Pertinent positives are listed in the HPI.  All other systems reviewed are negative.         Objective     Vital Sign Min/Max for last 24 hours  Temp  Min: 97.6 °F (36.4 °C)  Max: 98.6 °F (37 °C)   BP  Min: 86/60  Max: 140/85   Pulse  Min: 93  Max: 124   Resp  Min: 18  Max: 42   SpO2  Min: 80 %  Max: 100 %   No Data Recorded   Weight  Min: 81.6 kg (180 lb)  Max: 81.6 kg (180 lb)     Flowsheet Rows      First Filed Value   Admission Height  152.4 cm (60\") Documented at 12/03/2018 1800   Admission Weight  81.6 kg (180 lb) Documented at 12/03/2018 1800          Physical Exam:    GENERAL: well-developed, well-nourished; very anxious   NECK:   Carotid upstrokes are 2+ and  symmetrical without bruits.   LUNGS: Rales throughout to auscultation bilaterally without wheezing or rhonchi CARDIOVASCULAR: The heart has a tachycardic regular rate with a normal S1 and S2. There is no murmur, gallop, rub, or click appreciated. The PMI is nondisplaced.   ABDOMEN: Soft and nontender  NEUROLOGICAL: Nonfocal; Alert and oriented  PERIPHERAL VASCULAR:  Posterior tibial pulses are 2+ and symmetrical. There is no peripheral edema.   SKIN:  Warm and dry  PSYCHIATRIC: anxious; behavior appropriate     EKG:  Sinus tach    Echo: none performed    Labs:      Results from last 7 days   Lab Units  12/04/18   0339  12/03/18 2128   SODIUM mmol/L  135  135  135   POTASSIUM mmol/L  4.0  4.7  4.7   CHLORIDE mmol/L  106  109  109   CO2 mmol/L  19.0*  16.0*  16.0*   BUN mg/dL  14  13  13   CREATININE mg/dL  0.80  0.81  0.81   CALCIUM mg/dL  8.8  8.7  8.7   BILIRUBIN mg/dL   --   0.3   ALK PHOS U/L   --   91   ALT " (SGPT) U/L   --   76*   AST (SGOT) U/L   --   292*   GLUCOSE mg/dL  213*  244*  244*       Lab Results (last 24 hours)     Procedure Component Value Units Date/Time    Troponin [[id number]]  (Abnormal) Collected:  12/04/18 0339    Specimen:  Blood Updated:  12/04/18 0524     Troponin I >250.000 ng/mL      Comment: Results may be falsely decreased if patient taking Biotin.       Lipid Panel [[id number]]  (Abnormal) Collected:  12/04/18 0339    Specimen:  Blood Updated:  12/04/18 0520     Total Cholesterol 254 mg/dL      Triglycerides 230 mg/dL      HDL Cholesterol 30 mg/dL      LDL Cholesterol  199 mg/dL     TSH [[id number]]  (Normal) Collected:  12/04/18 0339    Specimen:  Blood Updated:  12/04/18 0520     TSH 0.831 mIU/mL     Magnesium [[id number]]  (Normal) Collected:  12/04/18 0339    Specimen:  Blood Updated:  12/04/18 0520     Magnesium 1.9 mg/dL     Phosphorus [[id number]]  (Abnormal) Collected:  12/04/18 0339    Specimen:  Blood Updated:  12/04/18 0520     Phosphorus 2.2 mg/dL     Lactic Acid, Reflex [[id number]]  (Abnormal) Collected:  12/04/18 0339    Specimen:  Blood Updated:  12/04/18 0450     Lactate 4.1 mmol/L      Comment: Falsely depressed results may occur on samples drawn from patients receiving N-Acetylcysteine (NAC) or Metamizole.       CBC (No Diff) [[id number]]  (Abnormal) Collected:  12/04/18 0339    Specimen:  Blood Updated:  12/04/18 0411     WBC 24.52 10*3/mm3      RBC 4.43 10*6/mm3      Hemoglobin 13.9 g/dL      Hematocrit 42.3 %      MCV 95.5 fL      MCH 31.4 pg      MCHC 32.9 g/dL      RDW 14.0 %      RDW-SD 48.6 fl      MPV 10.1 fL      Platelets 389 10*3/mm3     Lactic Acid, Reflex Timer (This will reflex a repeat order 3-3:15 hours after ordered.) [[id number]] Collected:  12/03/18 2158    Specimen:  Blood Updated:  12/04/18 0145     Extra Tube Hold for add-ons.     Comment: Auto resulted.       POC Activated Clotting Time [[id number]]  (Normal) Collected:  12/04/18 0149    Specimen:  Blood  Updated:  12/04/18 0106     Activated Clotting Time  142 Seconds      Comment: Serial Number: [id number]:  [id number]       POC Activated Clotting Time [[id number]]  (Abnormal) Collected:  12/04/18 0048    Specimen:  Blood Updated:  12/04/18 0106     Activated Clotting Time  153 Seconds      Comment: Serial Number: [id number]:  [id number]       POC Glucose Once [[id number]]  (Abnormal) Collected:  12/03/18 2334    Specimen:  Blood Updated:  12/03/18 2355     Glucose 232 mg/dL     Pregnancy, Urine - Urine, Clean Catch [[id number]]  (Normal) Collected:  12/03/18 2329    Specimen:  Urine, Clean Catch Updated:  12/03/18 2344     HCG, Urine QL Negative    Troponin [[id number]]  (Abnormal) Collected:  12/03/18 2128    Specimen:  Blood Updated:  12/03/18 2241     Troponin I >250.000 ng/mL      Comment: Results may be falsely decreased if patient taking Biotin.       CBC Auto Differential [[id number]]  (Abnormal) Collected:  12/03/18 2128    Specimen:  Blood Updated:  12/03/18 2241     WBC 28.96 10*3/mm3      RBC 4.68 10*6/mm3      Hemoglobin 14.6 g/dL      Hematocrit 44.4 %      MCV 94.9 fL      MCH 31.2 pg      MCHC 32.9 g/dL      RDW 14.0 %      RDW-SD 48.4 fl      MPV 10.5 fL      Platelets 469 10*3/mm3     Manual Differential [[id number]]  (Abnormal) Collected:  12/03/18 2128    Specimen:  Blood Updated:  12/03/18 2241     Neutrophil % 84.0 %      Lymphocyte % 6.0 %      Monocyte % 4.0 %      Eosinophil % 0.0 %      Basophil % 0.0 %      Bands %  6.0 %      Neutrophils Absolute 26.06 10*3/mm3      Lymphocytes Absolute 1.74 10*3/mm3      Monocytes Absolute 1.16 10*3/mm3      Eosinophils Absolute 0.00 10*3/mm3      Basophils Absolute 0.00 10*3/mm3      RBC Morphology Normal     WBC Morphology Normal     Platelet Morphology Normal    Magnesium [[id number]]  (Normal) Collected:  12/03/18 2128    Specimen:  Blood Updated:  12/03/18 2238     Magnesium 2.1 mg/dL     Phosphorus [[id number]]  (Normal) Collected:  12/03/18 2128     Specimen:  Blood Updated:  12/03/18 2238     Phosphorus 4.2 mg/dL     BNP [[id number]]  (Normal) Collected:  12/03/18 2158    Specimen:  Blood Updated:  12/03/18 2238     BNP 42.0 pg/mL      Comment: Results may be falsely decreased if patient taking Biotin.       Lactic Acid, Plasma [[id number]]  (Abnormal) Collected:  12/03/18 2158    Specimen:  Blood Updated:  12/03/18 2233     Lactate 3.9 mmol/L      Comment: Falsely depressed results may occur on samples drawn from patients receiving N-Acetylcysteine (NAC) or Metamizole.       Hemoglobin A1c [[id number]]  (Abnormal) Collected:  12/03/18 2128    Specimen:  Blood Updated:  12/03/18 2151     Hemoglobin A1C 6.90 %     Narrative:       The American Diabetes Association recommends maintenance of Hemoglobin A1C at 7.0% or lower. Goals for Hemoglobin A1C reduction may need to be modified if hypoglycemia is a problem.    Blood Gas, Arterial With Co-Ox [[id number]]  (Abnormal) Collected:  12/03/18 2044    Specimen:  Arterial Blood Updated:  12/03/18 2053     Site Arterial Line     Julio C's Test N/A     pH, Arterial 7.283 pH units      Comment: 84 Value below reference range        pCO2, Arterial 36.0 mm Hg      pO2, Arterial 140.0 mm Hg      Comment: 83 Value above reference range        HCO3, Arterial 17.0 mmol/L      Base Excess, Arterial -8.8 mmol/L      Hemoglobin, Blood Gas 14.5 g/dL      Hematocrit, Blood Gas 44.6 %      Oxyhemoglobin 97.4 %      Methemoglobin 0.60 %      Carboxyhemoglobin 1.2 %      Temperature 37.0 C      Barometric Pressure for Blood Gas -- mmHg      Comment: N/A        Modality BiPap     FIO2 100 %      Ventilator Mode       Comment: Meter: [id number]     :  [id number]        Note --     pH, Temp Corrected 7.283 pH Units      pCO2, Temperature Corrected 36.0 mm Hg      pO2, Temperature Corrected 140 mm Hg         Lab Results   Component Value Date    TROPONINI >250.000 (C) 12/04/2018    TROPONINI >250.000 (C) 12/03/2018             Ejection Fraction:  Unknown at time of arrival      Results Review:  I reviewed the patients new clinical results.      Assessment:  1. Acute anterior STEMI  2. Tobacco Abuse  3. Family history CAD      Plan:    Emergent left heart catheterization with Dr. Elen Boothe.  Dual antiplatelet therapy times one year  Statin beta blocker and ACE inhibitor will be initiated  Admitted to ICU postprocedure    I discussed the patients findings and my recommendations with patient.    Scribed for Elen Boothe MD by PATRICIA Delgado on 12/03/2018 at 8:24 AM    PATRICIA Chery  12/04/18  8:24 AM    I Elen Boothe MD personally performed the services described in this documentation as scribed by the above individual in my presence, and it is both accurate and complete.    Elen Boothe MD, FACC

## 2018-12-04 NOTE — PROGRESS NOTES
Discharge Planning Assessment  [facility]     Patient Name: Jennifer Dey  MRN: [id number]  Today's Date: 12/4/2018    Admit Date: 12/3/2018    Discharge Needs Assessment     Row Name 12/04/18 1228       Living Environment    Lives With  child(ike), adult pt resides in [facility]    Name(s) of Who Lives With Patient  Daughter- Margoth Biswas    Current Living Arrangements  home/apartment/condo    Primary Care Provided by  self    Provides Primary Care For  no one    Family Caregiver if Needed  child(ike), adult    Quality of Family Relationships  helpful;involved;supportive    Able to Return to Prior Arrangements  yes       Resource/Environmental Concerns    Resource/Environmental Concerns  financial    Financial Concerns  medicine, unable to afford;insurance, none       Transition Planning    Patient/Family Anticipates Transition to  home with family    Patient/Family Anticipated Services at Transition      Transportation Anticipated  family or friend will provide       Discharge Needs Assessment    Readmission Within the Last 30 Days  no previous admission in last 30 days    Concerns to be Addressed  discharge planning;medication;financial/insurance    Concerns Comments  [facility] to see pt    Equipment Currently Used at Home  none    Anticipated Changes Related to Illness  none    Equipment Needed After Discharge  other (see comments) TBD    Current Discharge Risk  financial support inadequate        Discharge Plan     Row Name 12/04/18 1233       Plan    Plan  home    Patient/Family in Agreement with Plan  yes    Plan Comments  CM spoke with pt at bedside. Pt  resides in [facility] with her daughter Margoth. Pt was independent prior to admission. Pt reports she does not have inusrance coverage and does not have a PCP. CM has contacted [facility] and they will come to see pt regarding insurance. CM will follow up with pt prior to discharge to arrange a PCP if pt agreeable. Pt plans to  return home with her daughters assistance.  CM will conitnue to follow.         Destination      No service coordination in this encounter.      Durable Medical Equipment      No service coordination in this encounter.      Dialysis/Infusion      No service coordination in this encounter.      Home Medical Care      No service coordination in this encounter.      Community Resources      No service coordination in this encounter.          Demographic Summary     Row Name 12/04/18 1225       General Information    Admission Type  inpatient    Referral Source  admission list    Reason for Consult  discharge planning    Preferred Language  English    General Information Comments  PCP- none       Contact Information    Permission Granted to Share Info With      Contact Information Comments  [phone]        Functional Status     Row Name 12/04/18 1225       Functional Status    Usual Activity Tolerance  moderate       Functional Status, IADL    Medications  independent    Meal Preparation  independent    Housekeeping  independent    Laundry  independent    Shopping  independent       Employment/    Employment/ Comments  Pt has no insurance, [facility] to see pt        Psychosocial    No documentation.       Abuse/Neglect    No documentation.       Legal    No documentation.       Substance Abuse    No documentation.       Patient Forms    No documentation.           Milady Gaines

## 2018-12-04 NOTE — PLAN OF CARE
Problem: Skin Injury Risk (Adult)  Goal: Skin Health and Integrity  Outcome: Ongoing (interventions implemented as appropriate)      Problem: Patient Care Overview  Goal: Plan of Care Review  Outcome: Ongoing (interventions implemented as appropriate)   12/04/18 8085   Coping/Psychosocial   Plan of Care Reviewed With patient   Plan of Care Review   Progress no change   OTHER   Outcome Summary pt continues to have intermittent chest pain and nausea/vomiting relieved by morphine and zofran.pt did have coffe ground emesis this am. dr. linn aware. sbp . ef 25%. replaced magnesium and phos.  heart rate 110's-120's and developed right bundle branch block. christina tran notified of ekg changes. No new orders at this time. will continue to closely monitor.      Goal: Individualization and Mutuality  Outcome: Ongoing (interventions implemented as appropriate)      Problem: Cardiac Catheterization (Diagnostic/Interventional) (Adult)  Goal: Signs and Symptoms of Listed Potential Problems Will be Absent, Minimized or Managed (Cardiac Catheterization)  Outcome: Ongoing (interventions implemented as appropriate)    Goal: Anesthesia/Sedation Recovery  Outcome: Outcome(s) achieved Date Met: 12/04/18      Problem: Cardiac: ACS (Acute Coronary Syndrome) (Adult)  Goal: Signs and Symptoms of Listed Potential Problems Will be Absent, Minimized or Managed (Cardiac: ACS)  Outcome: Ongoing (interventions implemented as appropriate)

## 2018-12-04 NOTE — PROGRESS NOTES
Pt. Referred for Phase II Cardiac Rehab. Staff discussed benefits of exercise, program protocol, and educational material provided. Teach back verified.  Patient states that she needs to discuss the program with her daughter before scheduling an appointment. Patient to call [facility] to schedule. Teach back verified.

## 2018-12-04 NOTE — PROGRESS NOTES
"Intensive Care Follow-up     Hospital:  LOS: 1 day   Ms. Jennifer Dey, 43 y.o. female is followed for:   Acute ST elevation myocardial infarction (STEMI) involving left anterior descending (LAD) coronary artery (CMS/HCC)        Subjective   Interval History:  The chart is been reviewed. The patient states that she is still having some chest pain intermittently. She is also having some nausea and vomiting. It was reported to me that she had some coffee-ground emesis however this has cleared up and there has been no bright red blood. Hemoglobin levels have been stable.    The patient's relevant past medical, surgical and social history were reviewed and updated in Epic as appropriate.        Objective     Infusions:    lactated ringers 50 mL/hr Last Rate: 50 mL/hr (12/04/18 0510)   norepinephrine 0.02-0.3 mcg/kg/min Last Rate: Stopped (12/04/18 0241)     Medications:    aspirin 81 mg Oral Daily   atorvastatin 40 mg Oral Nightly   clopidogrel 75 mg Oral Daily   famotidine 20 mg Intravenous Q12H   insulin lispro 0-9 Units Subcutaneous 4x Daily With Meals & Nightly   lisinopril 2.5 mg Oral Q24H   metoprolol tartrate 12.5 mg Oral Q12H   nicotine 1 patch Transdermal Q24H   pantoprazole 40 mg Intravenous Q12H   sodium chloride 3 mL Intravenous Q12H       Vital Sign Min/Max for last 24 hours  Temp  Min: 97.6 °F (36.4 °C)  Max: 99.2 °F (37.3 °C)   BP  Min: 86/60  Max: 140/85   Pulse  Min: 93  Max: 129   Resp  Min: 16  Max: 42   SpO2  Min: 80 %  Max: 100 %   Flow (L/min)  Min: 2  Max: 4       Input/Output for last 24 hour shift  12/03 0701 - 12/04 0700  In: 139.8 [I.V.:139.8]  Out: 800 [Urine:800]   FiO2 (%):  [40 %-100 %] 40 %  S RR:  [10-12] 10  Objective:  General Appearance:  Uncomfortable, ill-appearing, in no acute distress and not in pain.    Vital signs: (most recent): Blood pressure 95/61, pulse 111, temperature 98.7 °F (37.1 °C), resp. rate 18, height 152.4 cm (60\"), weight 81.6 kg (180 lb), SpO2 92 %.  No fever. "    HEENT: Normal HEENT exam.    Lungs:  Normal effort and normal respiratory rate.  Breath sounds clear to auscultation.  She is not in respiratory distress.  No decreased breath sounds, wheezes or rhonchi.    Heart: Tachycardia.  Regular rhythm.  S1 normal and S2 normal.  No murmur.   Chest: Symmetric chest wall expansion.   Abdomen: Abdomen is soft and non-distended.  Bowel sounds are normal.   There is no abdominal tenderness.     Extremities: Normal range of motion.  There is no deformity.    Neurological: Patient is alert.    Pupils:  Pupils are equal, round, and reactive to light.  Pupils are equal.   Skin:  Warm.  No rash or cyanosis.             Results from last 7 days   Lab Units  12/04/18   0339  12/03/18   2128   WBC 10*3/mm3  24.52*  28.96*   HEMOGLOBIN g/dL  13.9  14.6   PLATELETS 10*3/mm3  389  469*     Results from last 7 days   Lab Units  12/04/18   0339  12/03/18 2128   SODIUM mmol/L  135  135  135   POTASSIUM mmol/L  4.0  4.7  4.7   CO2 mmol/L  19.0*  16.0*  16.0*   BUN mg/dL  14  13  13   CREATININE mg/dL  0.80  0.81  0.81   MAGNESIUM mg/dL  1.9  2.1   PHOSPHORUS mg/dL  2.2*  4.2   GLUCOSE mg/dL  213*  244*  244*     Estimated Creatinine Clearance: 84 mL/min (by C-G formula based on SCr of 0.8 mg/dL).    Results from last 7 days   Lab Units  12/03/18   2044   PH, ARTERIAL pH units  7.283*   PCO2, ARTERIAL mm Hg  36.0   PO2 ART mm Hg  140.0*         I reviewed the patient's results and images.     Assessment/Plan   Impression        Anterior STEMI S/P stent X2 LAD per Dr. Boothe on 12/3    Current smoker    Bilateral pulmonary nodules    Class 2 obesity in adult    STEMI involving left anterior descending coronary artery (CMS/HCC)       Plan        Antinausea medicine as needed.  Insulin correction has been started.  We will watch her hemoglobin levels which have been stable. It is unclear to me whether or not she actually did have signs of GI bleeding although her recent vomitus  has not contained coffee grounds.  At some point after discharge she will need to have an evaluation of her pulmonary nodules. I will plan to look at her outside hospital film which by report showed only 6 mm nodules.  Wean oxygen as tolerated.    Plan of care and goals reviewed with mulitdisciplinary team at daily rounds.   I discussed the patient's findings and my recommendations with patient and nursing staff       Beto Phillips MD, [facility]  Pulmonary and Critical Care Medicine  12/04/18 4:30 PM

## 2018-12-04 NOTE — PROGRESS NOTES
Multidisciplinary Rounds    Time: 20min  Patient Name: Jennifer Dey  Date of Encounter: 12/04/18 12:11 PM  MRN: [id number]  Admission date: 12/3/2018      Reason for visit: MDR. RD to continue to follow per protocol.     Additional information obtained during MDR: RN reports pt had coffee-ground emesis several times over night, bile emesis this AM. States that pt drank Diet Rosibel this AM and vomited. Noted pt receiving IV zofran prn. Phos and Mg++ being replaced. HgbA1C= 6.9%, ? New dx DM. Will continue to follow and provide DM nutrition edu as medically appropriate.     Current diet: NPO Diet      Intervention:  Follow treatment plan  Care plan reviewed    Follow up:   Per protocol      Lisa Norman MS RD/LD CN[facility]  12:11 PM

## 2018-12-04 NOTE — PROGRESS NOTES
"[facility] Daily Note       LOS: 1 day   Patient Care Team:  Provider, Angela Known as PCP - General    Chief Complaint:  Chest pain    Subjective     Subjective: Continues to have chest discomfort as she had last evening after the procedure was complete.  She also states that she has no insurance and is not sure how she is going to pay for this.  Currently 9395% saturated on 4 L    Review of Systems:   As above.    Medications:    aspirin 81 mg Oral Daily   atorvastatin 40 mg Oral Nightly   clopidogrel 75 mg Oral Daily   famotidine 20 mg Intravenous Q12H   lisinopril 2.5 mg Oral Q24H   metoprolol tartrate 12.5 mg Oral Q12H   sodium chloride 3 mL Intravenous Q12H       Objective     Vital Sign Min/Max for last 24 hours  Temp  Min: 97.6 °F (36.4 °C)  Max: 98.6 °F (37 °C)   BP  Min: 86/60  Max: 140/85   Pulse  Min: 93  Max: 123   Resp  Min: 18  Max: 42   SpO2  Min: 80 %  Max: 100 %   Flow (L/min)  Min: 4  Max: 4   Weight  Min: 81.6 kg (180 lb)  Max: 81.6 kg (180 lb)      Intake/Output Summary (Last 24 hours) at 12/4/2018 0748  Last data filed at 12/4/2018 0600  Gross per 24 hour   Intake 139.8 ml   Output 800 ml   Net -660.2 ml        Flowsheet Rows      First Filed Value   Admission Height  152.4 cm (60\") Documented at 12/03/2018 1800   Admission Weight  81.6 kg (180 lb) Documented at 12/03/2018 1800          Physical Exam:    General: Alert and oriented.   Cardiovascular: Heart has a nondisplaced focal PMI. Tachy regular rate and rhythm without murmur, gallop or rub.  Lungs: Bilateral rales throughout  Abdomen: Soft, nontender.  Extremities: Show no edema.  No hematoma and no bruit  Skin: warm and dry.     Results Review:    I reviewed the patient's new clinical results.  EKG:  Tele: Sinus tach at 123    Labs:    Results from last 7 days   Lab Units  12/04/18   0339  12/03/18 2128   SODIUM mmol/L  135  135  135   POTASSIUM mmol/L  4.0  4.7  4.7   CHLORIDE mmol/L  106  109  109   CO2 mmol/L  19.0*  16.0*  " 16.0*   BUN mg/dL  14  13  13   CREATININE mg/dL  0.80  0.81  0.81   CALCIUM mg/dL  8.8  8.7  8.7   BILIRUBIN mg/dL   --   0.3   ALK PHOS U/L   --   91   ALT (SGPT) U/L   --   76*   AST (SGOT) U/L   --   292*   GLUCOSE mg/dL  213*  244*  244*     Results from last 7 days   Lab Units  12/04/18   0339  12/03/18   2128   WBC 10*3/mm3  24.52*  28.96*   HEMOGLOBIN g/dL  13.9  14.6   HEMATOCRIT %  42.3  44.4*   PLATELETS 10*3/mm3  389  469*   MONOCYTES % %   --   4.0     Lab Results   Component Value Date    TROPONINI >250.000 (C) 12/04/2018    TROPONINI >250.000 (C) 12/03/2018     Lab Results   Component Value Date    CHOL 254 (H) 12/04/2018     Lab Results   Component Value Date    TRIG 230 (H) 12/04/2018     Lab Results   Component Value Date    HDL 30 (L) 12/04/2018     No components found for: LDLCALC  No results found for: INR, PROTIME      Ejection Fraction: 25% with anterior akinesis and apical dyskinesis    Assessment   Assessment:    1. Acute anterior myocardial infarction status post stent placement ×2 using overlapping 3.0 x 18 mm Yvrose drug-eluting stents to the proximal and mid LAD December 3, 2018  2. Brief ventricular tachycardia reported by EMS.  She arrived on an amiodarone drip by ambulance from [facility]  3. Dyslipidemia with a low HDL of 30 and an LDL of 199  4. Leukocytosis probably related to stress  5. Tobacco abuse@2 packs per day  6. Bilateral pulmonary nodules measuring up to 6 mm in size by CT scan at OSH 12/3/18.      Plan:     assistance for financial support  Metoprolol for heart rate control  Lipitor as ordered  Lisinopril for LV dysfunction  Continue aspirin and Plavix  Diuresis  Patient may shower daily  LifeVest at time of discharge unless EF is markedly improved by echo tomorrow  Keep in ICU for 24 hours      Elen Boothe MD  12/04/18  7:48 AM

## 2018-12-04 NOTE — PROGRESS NOTES
"INTENSIVIST / PULMONARY FOLLOW UP NOTE     Hospital:  LOS: 0 days   Ms. Jennifer Dey, 43 y.o. female is followed for:     Anterior STEMI S/P stent X2 LAD per Dr. Boothe on 12/3    Current smoker    Bilateral pulmonary nodules    Class 2 obesity in adult          SUBJECTIVE   Jennifer Dey is a 43 y.o. female current smoker that presented to [facility] from [facility] with acute onset sharp substernal chest pain this afternoon while doing house chores. There was associated nausea and vomiting. Per the medical record, she did experience episodes of VT at the [facility]. Upon [facility] arrival Ms. Davila was noted to have ST elevation in leads V2-V4. She was taken to the cath lab and underwent PCI with stent X2 to the LAD per Dr. Boothe. EF was estimated to be ~25%. She will be brought to the ICU for higher level of care.     Upon ICU arrival Ms. Davila is noted to be hypoxic with SpO2 78% on 80% PRB. She denies chest pain or shortness of breath. Nausea has resolved. She was placed on Bipap with improvement of oxygenation. There are no additional complaints.     Ms. Davila denies any relevant past medical history as she states she has not been to a doctor \"in years\".     The patient's relevant past medical, surgical, family, and social history were reviewed    Allergies and medications were reviewed    ROS:  Per subjective, all other systems were reviewed and were negative        OBJECTIVE     Vital Sign Min/Max for last 24 hours:  Temp  Min: 98.6 °F (37 °C)  Max: 98.6 °F (37 °C)   BP  Min: 94/84  Max: 110/82   Pulse  Min: 95  Max: 98   Resp  Min: 27  Max: 42   SpO2  Min: 80 %  Max: 97 %   No Data Recorded     Physical Exam:  General Appearance:  Conversant, in no acute distress  Eyes:  No scleral icterus or pallor, pupils normal. Xanthelasma noted.   Ears, Nose, Mouth, Throat:  Atraumatic, oropharynx clear  Neck:  Trachea midline, thyroid normal. Negative JVD.   Respiratory:  Course expiratory wheezes and " rhonchi. Normal effort, no tenderness to palpation  Cardiovascular:  Regular rate and rhythm, no murmurs, no peripheral edema, no thrill  Gastrointestinal:  Obese, soft, non-tender, non-distended, no hepatosplenomegaly  Skin:  Right groin arterial/vemous sheath without evidence of hematoma. Normal temperature, no rash  Psychiatric:  Alert and oriented x 3, normal judgement and insight  Neuro:  No new focal neurologic deficits observed    Telemetry:   NSR-ST           Hemodynamics:   CVP:     PAP:     PAOP:     CO:     CI:     SVI:     SVR:       SpO2: 97 % SpO2  Min: 80 %  Max: 97 %   Device:      Flow Rate:   No Data Recorded     Mechanical Ventilator Settings:            Resp Rate (Set): 10 Resp Rate (Observed) Vent: 25   FiO2 (%): 100 %           Minute Ventilation (L/min) (Obs): 10.2 L/min          Intake/Ouptut 24 hrs (7:00AM - 6:59 AM)  Intake & Output (last 3 days)       12/01 0701 - 12/02 0700 12/02 0701 - 12/03 0700 12/03 0701 - 12/04 0700           Urine Unmeasured Occurrence   1 x          Lines, Drains & Airways    Active LDAs     Name:   Placement date:   Placement time:   Site:   Days:    Peripheral IV 12/03/18 Left Antecubital   12/03/18    --    Antecubital   less than 1    Arterial Sheath 6 Fr. Right Femoral   --    --    Femoral       Venous Sheath 6 Fr. Right Femoral   --    --    Femoral                 Hematology:        Invalid input(s): NEUTOPHILPCT,  EOSPCT  Electrolytes, Magnesium and Phosphorus:      Renal:      CrCl cannot be calculated (No order found.).  Hepatic:      Arterial Blood Gases:  Results from last 7 days   Lab Units  12/03/18 2044   PH, ARTERIAL pH units  7.283*   PCO2, ARTERIAL mm Hg  36.0   PO2 ART mm Hg  140.0*   FIO2 %  100             No results found for: LACTATE    Relevant imaging studies and labs from 12/03/18 were reviewed and interpreted by me    Medications (drips):         [START ON 12/4/2018] aspirin 81 mg Oral Daily   atorvastatin 40 mg Oral Nightly   [START  ON 12/4/2018] clopidogrel 75 mg Oral Daily   famotidine 20 mg Intravenous Q12H   [START ON 12/4/2018] lisinopril 2.5 mg Oral Q24H   metoprolol tartrate 12.5 mg Oral Q12H   sodium chloride 3 mL Intravenous Q12H       Assessment/Plan   IMPRESSION / PLAN     Inpatient Problem List:  43 y.o.female:  Active Hospital Problems    Diagnosis   • **Anterior STEMI S/P stent X2 LAD per Dr. Boothe on 12/3   • Current smoker   • Bilateral pulmonary nodules     Bilateral pulmonary nodules measuring up to 6 mm in size per CT at [facility] on 12/3      • Class 2 obesity in adult        Impression:  43 y.o.female with relevant PMH of Tobacco Abuse admitted 12/3/2018 w/ STEMI s/p 2 stents to LAD,ICM w/ EF 25% and acute systolic HF.    Plan:  1. Follow in ICU  2. Medications per cardiology   3. Bipap with ABG; Keep SPO2 >90%, may need diuretics +/- inotropes  4. AM labs  5. Troponin now and in AM  6. EKG now and in AM  7. Accuchecks every 6 hours   8. Mechanical VTE and PUD PPX  9. Bronchodilators  10. Nicotine replacement     Nutrition - NPO Diet    Plan of care and goals reviewed with mulitdisciplinary team at daily rounds    Critical Care time spent in direct patient care: 30 minutes (excluding procedure time, if applicable) including high complexity decision making to assess, manipulate, and support vital organ system failure in this individual who has impairment of one or more vital organ systems such that there is a high probability of imminent or life threatening deterioration in the patient’s condition.     PATRICIA Gonzalez, [facility]   Pulmonary and Critical Care     Mitchel Cruz MD  Intensive Care Medicine  12/03/18 9:18 PM

## 2018-12-04 NOTE — PLAN OF CARE
Problem: Skin Injury Risk (Adult)  Goal: Identify Related Risk Factors and Signs and Symptoms  Outcome: Outcome(s) achieved Date Met: 12/04/18    Goal: Skin Health and Integrity  Outcome: Ongoing (interventions implemented as appropriate)      Problem: Patient Care Overview  Goal: Plan of Care Review  Outcome: Ongoing (interventions implemented as appropriate)   12/04/18 0518   Coping/Psychosocial   Plan of Care Reviewed With patient   Plan of Care Review   Progress improving   OTHER   Outcome Summary Pt arrived in 2A ICU at 1952 from the cath lab. Pt arrived on 100% FiO2 BiPAP, one right femoral aterial sheath and one right venous sheath. Pt continued to complain of intermittent right sided chest pain with that does not radiate and nausea, SL Nitro and zofran given with releif of symptoms. Pt placed on 4L NC per her request at 0200 and sats have remained above 90%. 1 amp of bicarb given. Right femoral arterial sheath was pulled at 0116, and the right femoral venous sheath was pulled at 0138 with no complications. Pt then stated shortly after sheath removal that her right sided chest pain and nausea have began again. Pt states that the chest pain is different than the pain she was having earlier this evening. Pt had a small amount of red/brown coffee ground emesis. APRN made aware, SL Nitro given x2 and zofran also given with some relief of symptoms. At 0420 pt complained of worsening right sided chest pain with no radiation, morphine given and relieved symptoms. Pt is not resting comfortably in bed, with no complaints.        Problem: Cardiac Catheterization (Diagnostic/Interventional) (Adult)  Goal: Signs and Symptoms of Listed Potential Problems Will be Absent, Minimized or Managed (Cardiac Catheterization)  Outcome: Ongoing (interventions implemented as appropriate)

## 2018-12-04 NOTE — CONSULTS
Patient began having chest pain at time of attempted visit. Nurse at bedside attending to patients need. After brief introduction, excused self and will follow for a more appropriate time for education. Thank you for this referral.

## 2018-12-05 ENCOUNTER — APPOINTMENT (OUTPATIENT)
Dept: GENERAL RADIOLOGY | Facility: HOSPITAL | Age: 43
End: 2018-12-05

## 2018-12-05 ENCOUNTER — APPOINTMENT (OUTPATIENT)
Dept: CARDIOLOGY | Facility: HOSPITAL | Age: 43
End: 2018-12-05
Attending: INTERNAL MEDICINE

## 2018-12-05 LAB
ANION GAP SERPL CALCULATED.3IONS-SCNC: 8 MMOL/L (ref 3–11)
BH CV ECHO MEAS - AO MAX PG (FULL): 6.6 MMHG
BH CV ECHO MEAS - AO MAX PG: 8 MMHG
BH CV ECHO MEAS - AO MEAN PG (FULL): 4.4 MMHG
BH CV ECHO MEAS - AO MEAN PG: 5.1 MMHG
BH CV ECHO MEAS - AO ROOT AREA (BSA CORRECTED): 1.4
BH CV ECHO MEAS - AO ROOT AREA: 4.7 CM^2
BH CV ECHO MEAS - AO ROOT DIAM: 2.5 CM
BH CV ECHO MEAS - AO V2 MAX: 141.2 CM/SEC
BH CV ECHO MEAS - AO V2 MEAN: 106.7 CM/SEC
BH CV ECHO MEAS - AO V2 VTI: 25.5 CM
BH CV ECHO MEAS - AVA(I,A): 1.2 CM^2
BH CV ECHO MEAS - AVA(I,D): 1.2 CM^2
BH CV ECHO MEAS - AVA(V,A): 1.3 CM^2
BH CV ECHO MEAS - AVA(V,D): 1.3 CM^2
BH CV ECHO MEAS - BSA(HAYCOCK): 1.9 M^2
BH CV ECHO MEAS - BSA: 1.8 M^2
BH CV ECHO MEAS - BZI_BMI: 35.2 KILOGRAMS/M^2
BH CV ECHO MEAS - BZI_METRIC_HEIGHT: 152.4 CM
BH CV ECHO MEAS - BZI_METRIC_WEIGHT: 81.6 KG
BH CV ECHO MEAS - EDV(CUBED): 105.9 ML
BH CV ECHO MEAS - EDV(TEICH): 104 ML
BH CV ECHO MEAS - EF(CUBED): 27.3 %
BH CV ECHO MEAS - EF(TEICH): 22.1 %
BH CV ECHO MEAS - ESV(CUBED): 77 ML
BH CV ECHO MEAS - ESV(TEICH): 81 ML
BH CV ECHO MEAS - FS: 10.1 %
BH CV ECHO MEAS - IVS/LVPW: 1.2
BH CV ECHO MEAS - IVSD: 1 CM
BH CV ECHO MEAS - LA DIMENSION: 3.1 CM
BH CV ECHO MEAS - LA/AO: 1.3
BH CV ECHO MEAS - LAD MAJOR: 4.3 CM
BH CV ECHO MEAS - LAT PEAK E' VEL: 10.3 CM/SEC
BH CV ECHO MEAS - LATERAL E/E' RATIO: 8.3
BH CV ECHO MEAS - LV MASS(C)D: 151.6 GRAMS
BH CV ECHO MEAS - LV MASS(C)DI: 84.9 GRAMS/M^2
BH CV ECHO MEAS - LV MAX PG: 1.3 MMHG
BH CV ECHO MEAS - LV MEAN PG: 0.69 MMHG
BH CV ECHO MEAS - LV V1 MAX: 57.8 CM/SEC
BH CV ECHO MEAS - LV V1 MEAN: 38.1 CM/SEC
BH CV ECHO MEAS - LV V1 VTI: 10.1 CM
BH CV ECHO MEAS - LVIDD: 4.7 CM
BH CV ECHO MEAS - LVIDS: 4.3 CM
BH CV ECHO MEAS - LVOT AREA (M): 3.1 CM^2
BH CV ECHO MEAS - LVOT AREA: 3.1 CM^2
BH CV ECHO MEAS - LVOT DIAM: 2 CM
BH CV ECHO MEAS - LVPWD: 0.86 CM
BH CV ECHO MEAS - MED PEAK E' VEL: 7.5 CM/SEC
BH CV ECHO MEAS - MEDIAL E/E' RATIO: 11.3
BH CV ECHO MEAS - MV A MAX VEL: 57.8 CM/SEC
BH CV ECHO MEAS - MV E MAX VEL: 86.4 CM/SEC
BH CV ECHO MEAS - MV E/A: 1.5
BH CV ECHO MEAS - PA ACC SLOPE: 728.9 CM/SEC^2
BH CV ECHO MEAS - PA ACC TIME: 0.11 SEC
BH CV ECHO MEAS - PA MAX PG: 2.7 MMHG
BH CV ECHO MEAS - PA PR(ACCEL): 31.5 MMHG
BH CV ECHO MEAS - PA V2 MAX: 82.8 CM/SEC
BH CV ECHO MEAS - SI(AO): 67.7 ML/M^2
BH CV ECHO MEAS - SI(CUBED): 16.2 ML/M^2
BH CV ECHO MEAS - SI(LVOT): 17.7 ML/M^2
BH CV ECHO MEAS - SI(TEICH): 12.9 ML/M^2
BH CV ECHO MEAS - SV(AO): 120.8 ML
BH CV ECHO MEAS - SV(CUBED): 28.9 ML
BH CV ECHO MEAS - SV(LVOT): 31.6 ML
BH CV ECHO MEAS - SV(TEICH): 23 ML
BH CV ECHO MEAS - TAPSE (>1.6): 2.3 CM2
BH CV ECHO MEASUREMENTS AVERAGE E/E' RATIO: 9.71
BH CV VAS BP RIGHT ARM: NORMAL MMHG
BH CV XLRA - RV BASE: 2.9 CM
BH CV XLRA - RV LENGTH: 6.9 CM
BH CV XLRA - RV MID: 2.8 CM
BH CV XLRA - TDI S': 12.3 CM/SEC
BUN BLD-MCNC: 11 MG/DL (ref 9–23)
BUN/CREAT SERPL: 16.9 (ref 7–25)
CALCIUM SPEC-SCNC: 8.3 MG/DL (ref 8.7–10.4)
CHLORIDE SERPL-SCNC: 102 MMOL/L (ref 99–109)
CO2 SERPL-SCNC: 23 MMOL/L (ref 20–31)
CREAT BLD-MCNC: 0.65 MG/DL (ref 0.6–1.3)
DEPRECATED RDW RBC AUTO: 50.5 FL (ref 37–54)
ERYTHROCYTE [DISTWIDTH] IN BLOOD BY AUTOMATED COUNT: 14.2 % (ref 11.3–14.5)
GFR SERPL CREATININE-BSD FRML MDRD: 99 ML/MIN/1.73
GLUCOSE BLD-MCNC: 132 MG/DL (ref 70–100)
GLUCOSE BLDC GLUCOMTR-MCNC: 128 MG/DL (ref 70–130)
GLUCOSE BLDC GLUCOMTR-MCNC: 131 MG/DL (ref 70–130)
GLUCOSE BLDC GLUCOMTR-MCNC: 150 MG/DL (ref 70–130)
GLUCOSE BLDC GLUCOMTR-MCNC: 153 MG/DL (ref 70–130)
HCT VFR BLD AUTO: 35.7 % (ref 34.5–44)
HGB BLD-MCNC: 11.3 G/DL (ref 11.5–15.5)
LEFT ATRIUM VOLUME INDEX: 15.7 ML/M^2
LEFT ATRIUM VOLUME: 28 ML
LV EF 2D ECHO EST: 25 %
MAGNESIUM SERPL-MCNC: 2.1 MG/DL (ref 1.3–2.7)
MCH RBC QN AUTO: 30.8 PG (ref 27–31)
MCHC RBC AUTO-ENTMCNC: 31.7 G/DL (ref 32–36)
MCV RBC AUTO: 97.3 FL (ref 80–99)
PLATELET # BLD AUTO: 311 10*3/MM3 (ref 150–450)
PMV BLD AUTO: 10 FL (ref 6–12)
POTASSIUM BLD-SCNC: 4.1 MMOL/L (ref 3.5–5.5)
RBC # BLD AUTO: 3.67 10*6/MM3 (ref 3.89–5.14)
SODIUM BLD-SCNC: 133 MMOL/L (ref 132–146)
WBC NRBC COR # BLD: 18.93 10*3/MM3 (ref 3.5–10.8)

## 2018-12-05 PROCEDURE — 99233 SBSQ HOSP IP/OBS HIGH 50: CPT | Performed by: INTERNAL MEDICINE

## 2018-12-05 PROCEDURE — 85027 COMPLETE CBC AUTOMATED: CPT | Performed by: INTERNAL MEDICINE

## 2018-12-05 PROCEDURE — 25010000002 SULFUR HEXAFLUORIDE MICROSPH 60.7-25 MG RECONSTITUTED SUSPENSION: Performed by: INTERNAL MEDICINE

## 2018-12-05 PROCEDURE — 80048 BASIC METABOLIC PNL TOTAL CA: CPT | Performed by: INTERNAL MEDICINE

## 2018-12-05 PROCEDURE — 93306 TTE W/DOPPLER COMPLETE: CPT

## 2018-12-05 PROCEDURE — 25010000002 FUROSEMIDE PER 20 MG: Performed by: INTERNAL MEDICINE

## 2018-12-05 PROCEDURE — G0108 DIAB MANAGE TRN  PER INDIV: HCPCS | Performed by: REGISTERED NURSE

## 2018-12-05 PROCEDURE — 71045 X-RAY EXAM CHEST 1 VIEW: CPT

## 2018-12-05 PROCEDURE — 93306 TTE W/DOPPLER COMPLETE: CPT | Performed by: INTERNAL MEDICINE

## 2018-12-05 PROCEDURE — 25010000002 MORPHINE SULFATE (PF) 2 MG/ML SOLUTION: Performed by: INTERNAL MEDICINE

## 2018-12-05 PROCEDURE — 94660 CPAP INITIATION&MGMT: CPT

## 2018-12-05 PROCEDURE — 94799 UNLISTED PULMONARY SVC/PX: CPT

## 2018-12-05 PROCEDURE — 83735 ASSAY OF MAGNESIUM: CPT | Performed by: INTERNAL MEDICINE

## 2018-12-05 PROCEDURE — 82962 GLUCOSE BLOOD TEST: CPT

## 2018-12-05 PROCEDURE — 25010000002 ONDANSETRON PER 1 MG: Performed by: NURSE PRACTITIONER

## 2018-12-05 PROCEDURE — 25010000002 PROMETHAZINE PER 50 MG: Performed by: INTERNAL MEDICINE

## 2018-12-05 RX ORDER — PROMETHAZINE HYDROCHLORIDE 25 MG/ML
12.5 INJECTION, SOLUTION INTRAMUSCULAR; INTRAVENOUS EVERY 6 HOURS PRN
Status: DISCONTINUED | OUTPATIENT
Start: 2018-12-05 | End: 2018-12-07 | Stop reason: HOSPADM

## 2018-12-05 RX ORDER — ACETAMINOPHEN 500 MG
1000 TABLET ORAL EVERY 6 HOURS PRN
Status: DISCONTINUED | OUTPATIENT
Start: 2018-12-05 | End: 2018-12-07 | Stop reason: HOSPADM

## 2018-12-05 RX ORDER — FUROSEMIDE 10 MG/ML
20 INJECTION INTRAMUSCULAR; INTRAVENOUS ONCE
Status: COMPLETED | OUTPATIENT
Start: 2018-12-05 | End: 2018-12-05

## 2018-12-05 RX ORDER — PANTOPRAZOLE SODIUM 40 MG/1
40 TABLET, DELAYED RELEASE ORAL
Status: DISCONTINUED | OUTPATIENT
Start: 2018-12-05 | End: 2018-12-07 | Stop reason: HOSPADM

## 2018-12-05 RX ADMIN — ATORVASTATIN CALCIUM 40 MG: 40 TABLET, FILM COATED ORAL at 20:58

## 2018-12-05 RX ADMIN — PROMETHAZINE HYDROCHLORIDE 12.5 MG: 25 INJECTION INTRAMUSCULAR; INTRAVENOUS at 14:57

## 2018-12-05 RX ADMIN — FUROSEMIDE 20 MG: 10 INJECTION, SOLUTION INTRAMUSCULAR; INTRAVENOUS at 13:16

## 2018-12-05 RX ADMIN — MORPHINE SULFATE 2 MG: 2 INJECTION, SOLUTION INTRAMUSCULAR; INTRAVENOUS at 23:18

## 2018-12-05 RX ADMIN — SULFUR HEXAFLUORIDE 2 ML: KIT at 11:15

## 2018-12-05 RX ADMIN — FUROSEMIDE 20 MG: 10 INJECTION, SOLUTION INTRAMUSCULAR; INTRAVENOUS at 09:36

## 2018-12-05 RX ADMIN — MORPHINE SULFATE 2 MG: 2 INJECTION, SOLUTION INTRAMUSCULAR; INTRAVENOUS at 13:20

## 2018-12-05 RX ADMIN — MORPHINE SULFATE 2 MG: 2 INJECTION, SOLUTION INTRAMUSCULAR; INTRAVENOUS at 00:22

## 2018-12-05 RX ADMIN — METOPROLOL TARTRATE 12.5 MG: 25 TABLET, FILM COATED ORAL at 01:39

## 2018-12-05 RX ADMIN — PROMETHAZINE HYDROCHLORIDE 12.5 MG: 25 INJECTION INTRAMUSCULAR; INTRAVENOUS at 20:57

## 2018-12-05 RX ADMIN — MORPHINE SULFATE 2 MG: 2 INJECTION, SOLUTION INTRAMUSCULAR; INTRAVENOUS at 04:22

## 2018-12-05 RX ADMIN — METOPROLOL TARTRATE 12.5 MG: 25 TABLET, FILM COATED ORAL at 09:36

## 2018-12-05 RX ADMIN — MORPHINE SULFATE 2 MG: 2 INJECTION, SOLUTION INTRAMUSCULAR; INTRAVENOUS at 02:36

## 2018-12-05 RX ADMIN — LISINOPRIL 2.5 MG: 2.5 TABLET ORAL at 13:31

## 2018-12-05 RX ADMIN — MORPHINE SULFATE 2 MG: 2 INJECTION, SOLUTION INTRAMUSCULAR; INTRAVENOUS at 07:55

## 2018-12-05 RX ADMIN — PANTOPRAZOLE SODIUM 40 MG: 40 TABLET, DELAYED RELEASE ORAL at 09:48

## 2018-12-05 RX ADMIN — SODIUM CHLORIDE, POTASSIUM CHLORIDE, SODIUM LACTATE AND CALCIUM CHLORIDE 50 ML/HR: 600; 310; 30; 20 INJECTION, SOLUTION INTRAVENOUS at 03:55

## 2018-12-05 RX ADMIN — ONDANSETRON 4 MG: 2 INJECTION INTRAMUSCULAR; INTRAVENOUS at 03:55

## 2018-12-05 RX ADMIN — CLOPIDOGREL BISULFATE 75 MG: 75 TABLET ORAL at 09:36

## 2018-12-05 RX ADMIN — MORPHINE SULFATE 2 MG: 2 INJECTION, SOLUTION INTRAMUSCULAR; INTRAVENOUS at 20:57

## 2018-12-05 RX ADMIN — MORPHINE SULFATE 2 MG: 2 INJECTION, SOLUTION INTRAMUSCULAR; INTRAVENOUS at 16:30

## 2018-12-05 RX ADMIN — NICOTINE 1 PATCH: 21 PATCH, EXTENDED RELEASE TRANSDERMAL at 09:37

## 2018-12-05 RX ADMIN — PROMETHAZINE HYDROCHLORIDE 12.5 MG: 25 INJECTION INTRAMUSCULAR; INTRAVENOUS at 07:56

## 2018-12-05 RX ADMIN — ASPIRIN 81 MG 81 MG: 81 TABLET ORAL at 09:36

## 2018-12-05 NOTE — PLAN OF CARE
Problem: Patient Care Overview  Goal: Plan of Care Review  Pt received from ICU today, groin site cdi, occasionally having chest pain with coughing, & nausea. Morphine prn. Extra IV Lasix given today, reamins sinus tach low one hundreds to one teens. Pt prefers phenergan over zofran, states that it doesn't really help, up to bathroom on own. 4L of O2 continuing. Bipap in room for sleep

## 2018-12-05 NOTE — PLAN OF CARE
Problem: Skin Injury Risk (Adult)  Goal: Skin Health and Integrity  Outcome: Ongoing (interventions implemented as appropriate)      Problem: Patient Care Overview  Goal: Plan of Care Review  Outcome: Ongoing (interventions implemented as appropriate)   12/05/18 0557   Coping/Psychosocial   Plan of Care Reviewed With patient   Plan of Care Review   Progress no change   OTHER   Outcome Summary VSS. Pt was nauseous through out the night and only scant amounts of clear emesis. Was relieved with zofran x2. Complained of chest pain at beginning of shift with relief from one dose of nitro. Chest pain is now only present when pt coughs. R Groin incision pain increased through out the night and was relieved by Morphine. Site is CDI and soft. pt refused the BIPAP during the night. Pt is now on the BIPAP for decreased sats and increased RR. Plans for an echo today. Waiting for morning lab results.      Goal: Individualization and Mutuality  Outcome: Ongoing (interventions implemented as appropriate)    Goal: Discharge Needs Assessment  Outcome: Ongoing (interventions implemented as appropriate)    Goal: Interprofessional Rounds/Family Conf  Outcome: Ongoing (interventions implemented as appropriate)      Problem: Cardiac Catheterization (Diagnostic/Interventional) (Adult)  Goal: Signs and Symptoms of Listed Potential Problems Will be Absent, Minimized or Managed (Cardiac Catheterization)  Outcome: Ongoing (interventions implemented as appropriate)      Problem: Cardiac: ACS (Acute Coronary Syndrome) (Adult)  Goal: Signs and Symptoms of Listed Potential Problems Will be Absent, Minimized or Managed (Cardiac: ACS)  Outcome: Ongoing (interventions implemented as appropriate)

## 2018-12-05 NOTE — CONSULTS
Clinical Nutrition   Reason For Visit: MDR, Physician consult, Need for education    Patient Name: Jennifer Dey  YOB: 1975  MRN: [id number]  Date of Encounter: 12/05/18 2:35 PM  Admission date: 12/3/2018      Nutrition Assessment     Admission Problem List:  STEMI  S/p heart cath (12/3)- stent x2 to LAD  EF ~25%  DM2- new diagnosis  DLP      PMH: She  has a past medical history of CAD (coronary artery disease), Current smoker (12/3/2018), and Low back pain (12/3/2018).   PSxH: She  has a past surgical history that includes LEFT HEART CATH (N/A, 12/3/2018).        Reported/Observed/Food/Nutrition Related History     Pt reports that she is a picky eater and does not like the food being sent. Pt provided some food preferences- fried meats, baked chicken, fried potatoes, chips, cake. Daughter states that pt only likes a few fruits and vegetables- mashed potatoes, fried potatoes, corn, apples, bananas, oranges, and watermelon. Pt states that she uses a lot of salt at home. RN reports during MDR that pt vomited again this AM. Pt reports that time of RD visit that she is not feeling nauseated.     Pt reported usual daily food/fluid intake:  Breakfast: 3 biscuits and gravy, 6 pieces of kimble  Snacks through out the day: 1/2 regular-sized bag of Doritos, bologna sandwich, 2 donuts, 1 bag of popcorn  Dinner: meat (jumana steak or Shake n Bake pork chops), mashed potatoes, corn, and a roll  HS snacks: chips  Fluids: pt reports that she drinks 18 cans of regular Mt. Dew per day (=3060 kcal, 828 g CHO)      Anthropometrics   Height: 60 in  Weight: 180 lb (12/3), no method documented  BMI: 35.2  BMI classification: Obese Class II: 35-39.9kg/m2       Labs reviewed   Labs reviewed: Yes  Results from last 7 days   Lab Units  12/05/18   0437  12/04/18   1853  12/04/18   0339  12/03/18   2128   SODIUM mmol/L  133   --   135  135  135   POTASSIUM mmol/L  4.1   --   4.0  4.7  4.7   CHLORIDE mmol/L  102   --   106   109  109   CO2 mmol/L  23.0   --   19.0*  16.0*  16.0*   BUN mg/dL  11   --   14  13  13   CREATININE mg/dL  0.65   --   0.80  0.81  0.81   GLUCOSE mg/dL  132*   --   213*  244*  244*   CALCIUM mg/dL  8.3*   --   8.8  8.7  8.7   PHOSPHORUS mg/dL   --   3.2  2.2*  4.2   MAGNESIUM mg/dL  2.1   --   1.9  2.1   CHOLESTEROL mg/dL   --    --   254*   --    TRIGLYCERIDES mg/dL   --    --   230*   --      Lab Results   Lab Value Date/Time    HGBA1C 6.90 (H) 12/03/2018 2128     Medications reviewed   Medications reviewed: Yes  Pertinent: IV phenergan prn     Current Nutrition Prescription   PO: Diet Regular; Consistent Carbohydrate, Cardiac    Evaluation of Received Nutrient/Fluid Intake:  Insufficient data   5%/2 meals <-- reflects PO intake of clear liquids       Nutrition Diagnosis   12/5  Problem Food and nutrition knowledge deficit   Etiology No prior need for nutrition edu   Signs/Symptoms Pt with new diagnosis of DM2, MI       Intervention   Intervention: Follow treatment progress, Care plan reviewed, Education provided   -RD provided DM and heart healthy nutrition education to pt and pt's daughter, education materials provided from the [employer]  -Discussed carbohydrate counting, labeling reading, diet for elevated triglycerides, decreasing sodium intake/seasoning alternatives, portions sizes  -Recommended for pt to do 4-5 carbohydrate servings/meal, 1 carbohydrate serving per snack  -Recommended for pt to change from regular Mt. Dew to diet Mt. Dew      Goal:   General: Nutrition support treatment  PO: Establish PO      Monitoring/Evaluation:       Monitoring/Evaluation: Per protocol, PO intake, Pertinent labs, GI status, Symptoms  Will Continue to follow per protocol  Lisa Norman, MS RD/LD [facility]  Time Spent: 60 minutes

## 2018-12-05 NOTE — CONSULTS
"Diabetes Education  Assessment/Teaching    Patient Name:  Jennifer Dey  YOB: 1975  MRN: [id number]  Admit Date:  12/3/2018      Assessment Date:  12/5/2018    Most Recent Value   General Information    Referral From:  MD alejandro   Height  152.4 cm (60\")   Weight  81.6 kg (180 lb)   Pregnancy Assessment   Diabetes History   What type of diabetes do you have?  Type 2   Length of Diabetes Diagnosis  Newly diagnosed <6 months   Current DM knowledge  fair   Have you had diabetes education/teaching in the past?  no   Do you test your blood sugar at home?  no   Education Preferences   Nutrition Information   Assessment Topics   Healthy Eating - Assessment  Needs education   Being Active - Assessment  Needs education   Taking Medication - Assessment  Needs education   Problem Solving - Assessment  Needs education   Reducing Risk - Assessment  Needs education   Healthy Coping - Assessment  Needs education   Monitoring - Assessment  Needs education   DM Goals            Most Recent Value   DM Education Needs   Meter  Meter provided   Meter Type  -- [true metrix]   Problem Solving  Hypoglycemia, Hyperglycemia, Sick days, Signs, Symptoms   Reducing Risks  A1C testing   Physical Activity  None   Physical Activity Frequency  Never   Healthy Coping  Appropriate   Discharge Plan  Home   Motivation  Moderate   Teaching Method  Explanation, Discussion, Handouts, Teach back, Demonstration   Patient Response  Verbalized understanding            Other Comments:  Patient was seen for new diagnosis of type 2 diabetes. Discussed and taught patient about type 2 diabetes self-management, risk factors, and importance of blood glucose control to reduce complications. Target blood glucose readings and A1c goals per ADA were reviewed. Reviewed with patient current A1c of 6.9% and discussed its significance. Signs, symptoms and treatment of hyperglycemia and hypoglycemia were discussed. Lifestyle changes such as physical " activity with MD approval and healthy eating were encouraged. Patient does not do any activity at this time. Discussed walking 10 minutes twice per week. Patient provided and demonstrated blood glucose meter and was able to return demonstration appropriately. Provided True Metric meter. Patient was encouraged to keep record of blood glucose readings to take to follow up appointment with PCP.   Patient does not have a PCP. Encouraged OP education at [facility].         Electronically signed by:  Julissa Lu RN  12/05/18 2:06 PM

## 2018-12-05 NOTE — PROGRESS NOTES
"[facility] Daily Note       LOS: 2 days   Patient Care Team:  Provider, No Known as PCP - General    Chief Complaint:  Chest pain    Subjective     Subjective: Still nauseated this morning and very sore in her chest.  She does note that her breathing is a little bit better.      Review of Systems:   As above.    Medications:    aspirin 81 mg Oral Daily   atorvastatin 40 mg Oral Nightly   clopidogrel 75 mg Oral Daily   famotidine 20 mg Intravenous Q12H   insulin lispro 0-9 Units Subcutaneous 4x Daily With Meals & Nightly   lisinopril 2.5 mg Oral Q24H   metoprolol tartrate 12.5 mg Oral Q12H   nicotine 1 patch Transdermal Q24H   pantoprazole 40 mg Intravenous Q12H   sodium chloride 3 mL Intravenous Q12H       Objective     Vital Sign Min/Max for last 24 hours  Temp  Min: 98.4 °F (36.9 °C)  Max: 99.2 °F (37.3 °C)   BP  Min: 88/65  Max: 136/74   Pulse  Min: 95  Max: 125   Resp  Min: 16  Max: 22   SpO2  Min: 88 %  Max: 100 %   Flow (L/min)  Min: 2  Max: 4   No Data Recorded      Intake/Output Summary (Last 24 hours) at 12/5/2018 0723  Last data filed at 12/5/2018 0600  Gross per 24 hour   Intake 1736 ml   Output 1100 ml   Net 636 ml        Flowsheet Rows      First Filed Value   Admission Height  152.4 cm (60\") Documented at 12/03/2018 1800   Admission Weight  81.6 kg (180 lb) Documented at 12/03/2018 1800          Physical Exam:    General: Alert and oriented.   Cardiovascular: Heart has a nondisplaced focal PMI. Tachy regular rate and rhythm without murmur, gallop or rub.  Lungs: Few basilar rales are heard  Abdomen: Soft, nontender.  Extremities: Show no edema.   Skin: warm and dry.     Results Review:    I reviewed the patient's new clinical results.  EKG:  Tele: Sinus tach at 111    Labs:    Results from last 7 days   Lab Units  12/05/18   0437  12/04/18   0339  12/03/18   2128   SODIUM mmol/L  133  135  135  135   POTASSIUM mmol/L  4.1  4.0  4.7  4.7   CHLORIDE mmol/L  102  106  109  109   CO2 mmol/L  " 23.0  19.0*  16.0*  16.0*   BUN mg/dL  11  14  13  13   CREATININE mg/dL  0.65  0.80  0.81  0.81   CALCIUM mg/dL  8.3*  8.8  8.7  8.7   BILIRUBIN mg/dL   --    --   0.3   ALK PHOS U/L   --    --   91   ALT (SGPT) U/L   --    --   76*   AST (SGOT) U/L   --    --   292*   GLUCOSE mg/dL  132*  213*  244*  244*     Results from last 7 days   Lab Units  12/05/18   0437  12/04/18   1853  12/04/18   0339  12/03/18   2128   WBC 10*3/mm3  18.93*   --   24.52*  28.96*   HEMOGLOBIN g/dL  11.3*  12.4  13.9  14.6   HEMATOCRIT %  35.7  38.6  42.3  44.4*   PLATELETS 10*3/mm3  311   --   389  469*   MONOCYTES % %   --    --    --   4.0     Lab Results   Component Value Date    TROPONINI >250.000 (C) 12/04/2018    TROPONINI >250.000 (C) 12/03/2018     Lab Results   Component Value Date    CHOL 254 (H) 12/04/2018     Lab Results   Component Value Date    TRIG 230 (H) 12/04/2018     Lab Results   Component Value Date    HDL 30 (L) 12/04/2018     No components found for: LDLCALC  No results found for: INR, PROTIME      Ejection Fraction: 25% with anterior akinesis and apical dyskinesis    Assessment   Assessment:    1. Acute anterior myocardial infarction status post stent placement ×2 using overlapping 3.0 x 18 mm Yvrose drug-eluting stents to the proximal and mid LAD December 3, 2018  2. Brief ventricular tachycardia reported by EMS.  She arrived on an amiodarone drip by ambulance [facility]  3. Dyslipidemia with a low HDL of 30 and an LDL of 199  4. DM type 2  5. Leukocytosis probably related to stress  6. Tobacco abuse@2 packs per day  7. Bilateral pulmonary nodules measuring up to 6 mm in size by CT scan at OSH 12/3/18.      Plan:    To telemetry   Echocardiogram today to reevaluate LVEF    assistance for financial support  Metoprolol for heart rate control  Lipitor as ordered  Lisinopril for LV dysfunction  Continue aspirin and Plavix  Patient may shower daily  LifeVest at time of discharge unless EF  is markedly improved by echo tomorrow  Phenergan for nausea as Zofran is not very helpful  20 mg IV Yimi Boothe MD  12/05/18  7:23 AM

## 2018-12-06 LAB
ACT BLD: 290 SECONDS (ref 82–152)
ANION GAP SERPL CALCULATED.3IONS-SCNC: 8 MMOL/L (ref 3–11)
BUN BLD-MCNC: 11 MG/DL (ref 9–23)
BUN/CREAT SERPL: 17.7 (ref 7–25)
CALCIUM SPEC-SCNC: 8.3 MG/DL (ref 8.7–10.4)
CHLORIDE SERPL-SCNC: 102 MMOL/L (ref 99–109)
CO2 SERPL-SCNC: 27 MMOL/L (ref 20–31)
CREAT BLD-MCNC: 0.62 MG/DL (ref 0.6–1.3)
GFR SERPL CREATININE-BSD FRML MDRD: 104 ML/MIN/1.73
GLUCOSE BLD-MCNC: 109 MG/DL (ref 70–100)
GLUCOSE BLDC GLUCOMTR-MCNC: 125 MG/DL (ref 70–130)
GLUCOSE BLDC GLUCOMTR-MCNC: 134 MG/DL (ref 70–130)
GLUCOSE BLDC GLUCOMTR-MCNC: 137 MG/DL (ref 70–130)
GLUCOSE BLDC GLUCOMTR-MCNC: 208 MG/DL (ref 70–130)
POTASSIUM BLD-SCNC: 3.9 MMOL/L (ref 3.5–5.5)
SODIUM BLD-SCNC: 137 MMOL/L (ref 132–146)

## 2018-12-06 PROCEDURE — 25010000002 ONDANSETRON PER 1 MG: Performed by: NURSE PRACTITIONER

## 2018-12-06 PROCEDURE — 80048 BASIC METABOLIC PNL TOTAL CA: CPT | Performed by: INTERNAL MEDICINE

## 2018-12-06 PROCEDURE — 25010000002 PROMETHAZINE PER 50 MG: Performed by: INTERNAL MEDICINE

## 2018-12-06 PROCEDURE — 25010000002 DIGOXIN PER 500 MCG: Performed by: INTERNAL MEDICINE

## 2018-12-06 PROCEDURE — 82962 GLUCOSE BLOOD TEST: CPT

## 2018-12-06 PROCEDURE — 25010000002 MORPHINE SULFATE (PF) 2 MG/ML SOLUTION: Performed by: INTERNAL MEDICINE

## 2018-12-06 PROCEDURE — 94660 CPAP INITIATION&MGMT: CPT

## 2018-12-06 PROCEDURE — 94799 UNLISTED PULMONARY SVC/PX: CPT

## 2018-12-06 PROCEDURE — 99232 SBSQ HOSP IP/OBS MODERATE 35: CPT | Performed by: INTERNAL MEDICINE

## 2018-12-06 RX ORDER — SPIRONOLACTONE 25 MG/1
25 TABLET ORAL DAILY
Status: DISCONTINUED | OUTPATIENT
Start: 2018-12-06 | End: 2018-12-07 | Stop reason: HOSPADM

## 2018-12-06 RX ORDER — DIGOXIN 0.25 MG/ML
500 INJECTION INTRAMUSCULAR; INTRAVENOUS ONCE
Status: COMPLETED | OUTPATIENT
Start: 2018-12-06 | End: 2018-12-06

## 2018-12-06 RX ORDER — FUROSEMIDE 40 MG/1
40 TABLET ORAL DAILY
Status: DISCONTINUED | OUTPATIENT
Start: 2018-12-06 | End: 2018-12-07 | Stop reason: HOSPADM

## 2018-12-06 RX ADMIN — ALPRAZOLAM 0.25 MG: 0.25 TABLET ORAL at 09:27

## 2018-12-06 RX ADMIN — PROMETHAZINE HYDROCHLORIDE 12.5 MG: 25 INJECTION INTRAMUSCULAR; INTRAVENOUS at 04:45

## 2018-12-06 RX ADMIN — ATORVASTATIN CALCIUM 40 MG: 40 TABLET, FILM COATED ORAL at 21:19

## 2018-12-06 RX ADMIN — INSULIN LISPRO 4 UNITS: 100 INJECTION, SOLUTION INTRAVENOUS; SUBCUTANEOUS at 21:27

## 2018-12-06 RX ADMIN — PROMETHAZINE HYDROCHLORIDE 12.5 MG: 25 INJECTION INTRAMUSCULAR; INTRAVENOUS at 17:00

## 2018-12-06 RX ADMIN — ASPIRIN 81 MG 81 MG: 81 TABLET ORAL at 08:23

## 2018-12-06 RX ADMIN — DIGOXIN 500 MCG: 0.25 INJECTION INTRAMUSCULAR; INTRAVENOUS at 08:23

## 2018-12-06 RX ADMIN — FUROSEMIDE 40 MG: 40 TABLET ORAL at 08:23

## 2018-12-06 RX ADMIN — METOPROLOL TARTRATE 12.5 MG: 25 TABLET, FILM COATED ORAL at 21:20

## 2018-12-06 RX ADMIN — PROMETHAZINE HYDROCHLORIDE 12.5 MG: 25 INJECTION INTRAMUSCULAR; INTRAVENOUS at 11:39

## 2018-12-06 RX ADMIN — MORPHINE SULFATE 2 MG: 2 INJECTION, SOLUTION INTRAMUSCULAR; INTRAVENOUS at 11:39

## 2018-12-06 RX ADMIN — MORPHINE SULFATE 2 MG: 2 INJECTION, SOLUTION INTRAMUSCULAR; INTRAVENOUS at 17:00

## 2018-12-06 RX ADMIN — NICOTINE 1 PATCH: 21 PATCH, EXTENDED RELEASE TRANSDERMAL at 08:49

## 2018-12-06 RX ADMIN — PANTOPRAZOLE SODIUM 40 MG: 40 TABLET, DELAYED RELEASE ORAL at 06:39

## 2018-12-06 RX ADMIN — SODIUM CHLORIDE, PRESERVATIVE FREE 3 ML: 5 INJECTION INTRAVENOUS at 08:26

## 2018-12-06 RX ADMIN — MORPHINE SULFATE 2 MG: 2 INJECTION, SOLUTION INTRAMUSCULAR; INTRAVENOUS at 21:18

## 2018-12-06 RX ADMIN — METOPROLOL TARTRATE 12.5 MG: 25 TABLET, FILM COATED ORAL at 08:22

## 2018-12-06 RX ADMIN — SPIRONOLACTONE 25 MG: 25 TABLET ORAL at 08:23

## 2018-12-06 RX ADMIN — PROMETHAZINE HYDROCHLORIDE 12.5 MG: 25 INJECTION INTRAMUSCULAR; INTRAVENOUS at 23:31

## 2018-12-06 RX ADMIN — SODIUM CHLORIDE, PRESERVATIVE FREE 3 ML: 5 INJECTION INTRAVENOUS at 22:03

## 2018-12-06 RX ADMIN — ONDANSETRON 4 MG: 2 INJECTION INTRAMUSCULAR; INTRAVENOUS at 08:49

## 2018-12-06 RX ADMIN — CLOPIDOGREL BISULFATE 75 MG: 75 TABLET ORAL at 08:23

## 2018-12-06 RX ADMIN — ONDANSETRON 4 MG: 2 INJECTION INTRAMUSCULAR; INTRAVENOUS at 21:18

## 2018-12-06 RX ADMIN — MORPHINE SULFATE 2 MG: 2 INJECTION, SOLUTION INTRAMUSCULAR; INTRAVENOUS at 03:43

## 2018-12-06 NOTE — PLAN OF CARE
Problem: Cardiac: ACS (Acute Coronary Syndrome) (Adult)  Goal: Signs and Symptoms of Listed Potential Problems Will be Absent, Minimized or Managed (Cardiac: ACS)  Outcome: Ongoing (interventions implemented as appropriate)   12/06/18 8434   Goal/Outcome Evaluation   Problems Assessed (Acute Coronary Syndrome) all   Problems Present (Acute Coronary Syn) none

## 2018-12-06 NOTE — PROGRESS NOTES
"[facility] Daily Note       LOS: 3 days   Patient Care Team:  Provider, No Known as PCP - General    Chief Complaint:  Chest pain    Subjective     Subjective: Still somewhat nauseated but apparently this goes along with her menstrual cycle.  She feels less short of breath but is only 92% on 3.5 L of oxygen.  She still has some soreness in her chest..      Review of Systems:   As above.    Medications:    aspirin 81 mg Oral Daily   atorvastatin 40 mg Oral Nightly   clopidogrel 75 mg Oral Daily   insulin lispro 0-9 Units Subcutaneous 4x Daily With Meals & Nightly   lisinopril 2.5 mg Oral Q24H   metoprolol tartrate 12.5 mg Oral Q12H   nicotine 1 patch Transdermal Q24H   pantoprazole 40 mg Oral Q AM   pharmacy consult - MTM  Does not apply Daily   sodium chloride 3 mL Intravenous Q12H       Objective     Vital Sign Min/Max for last 24 hours  Temp  Min: 98.1 °F (36.7 °C)  Max: 99.2 °F (37.3 °C)   BP  Min: 90/55  Max: 119/78   Pulse  Min: 98  Max: 118   Resp  Min: 16  Max: 24   SpO2  Min: 89 %  Max: 98 %   Flow (L/min)  Min: 2.5  Max: 4   Weight  Min: 81.6 kg (180 lb)  Max: 87.1 kg (192 lb)      Intake/Output Summary (Last 24 hours) at 12/6/2018 0619  Last data filed at 12/6/2018 0340  Gross per 24 hour   Intake 1232 ml   Output 1960 ml   Net -728 ml        Flowsheet Rows      First Filed Value   Admission Height  152.4 cm (60\") Documented at 12/03/2018 1800   Admission Weight  81.6 kg (180 lb) Documented at 12/03/2018 1800          Physical Exam:    General: Alert and oriented.   Cardiovascular: Heart has a nondisplaced focal PMI. Tachy regular rate and rhythm without murmur, gallop or rub.  Lungs: Few basilar rales are heard  Abdomen: Soft, nontender.  Extremities: Show no edema.   Skin: warm and dry.     Results Review:    I reviewed the patient's new clinical results.  EKG:  Tele: Sinus tach at 111    Labs:    Results from last 7 days   Lab Units  12/05/18   0437  12/04/18   0339  12/03/18   2128   SODIUM " mmol/L  133  135  135  135   POTASSIUM mmol/L  4.1  4.0  4.7  4.7   CHLORIDE mmol/L  102  106  109  109   CO2 mmol/L  23.0  19.0*  16.0*  16.0*   BUN mg/dL  11  14  13  13   CREATININE mg/dL  0.65  0.80  0.81  0.81   CALCIUM mg/dL  8.3*  8.8  8.7  8.7   BILIRUBIN mg/dL   --    --   0.3   ALK PHOS U/L   --    --   91   ALT (SGPT) U/L   --    --   76*   AST (SGOT) U/L   --    --   292*   GLUCOSE mg/dL  132*  213*  244*  244*     Results from last 7 days   Lab Units  12/05/18   0437  12/04/18   1853  12/04/18   0339  12/03/18   2128   WBC 10*3/mm3  18.93*   --   24.52*  28.96*   HEMOGLOBIN g/dL  11.3*  12.4  13.9  14.6   HEMATOCRIT %  35.7  38.6  42.3  44.4*   PLATELETS 10*3/mm3  311   --   389  469*   MONOCYTES % %   --    --    --   4.0     Lab Results   Component Value Date    TROPONINI >250.000 (C) 12/04/2018    TROPONINI >250.000 (C) 12/03/2018     Lab Results   Component Value Date    CHOL 254 (H) 12/04/2018     Lab Results   Component Value Date    TRIG 230 (H) 12/04/2018     Lab Results   Component Value Date    HDL 30 (L) 12/04/2018     No components found for: LDLCALC  No results found for: INR, PROTIME      Ejection Fraction: 25% with anterior akinesis and apical dyskinesis    Assessment   Assessment:    1. Acute anterior myocardial infarction status post stent placement ×2 using overlapping 3.0 x 18 mm Yvrose drug-eluting stents to the proximal and mid LAD December 3, 2018  2. Brief ventricular tachycardia reported by EMS.  She arrived on an amiodarone drip by ambulance from [facility]  3. Dyslipidemia with a low HDL of 30 and an LDL of 199  4. DM type 2  5. Leukocytosis probably related to stress  6. Tobacco abuse@2 packs per day  7. Bilateral pulmonary nodules measuring up to 6 mm in size by CT scan at OSH 12/3/18.      Plan:    A digoxin ×1 dose to see if this helps with heart rate control.  Ideally Corlanor would be a good drug for her but I know that she'll be unable to afford  it.  Metoprolol for heart rate control  Lipitor as ordered  Blood pressure too low for ACEI or ARB  Continue aspirin and Plavix  Patient may shower daily  LifeVest at time of discharge for EF estimated at 20-25%  Lasix 40 mg by mouth daily  Spironolactone 25 mg by mouth daily  [facility] in a..      Elen Boothe MD  12/06/18  6:19 AM

## 2018-12-06 NOTE — PLAN OF CARE
Problem: Patient Care Overview  Goal: Plan of Care Review  Outcome: Ongoing (interventions implemented as appropriate)   12/06/18 6332   Coping/Psychosocial   Plan of Care Reviewed With patient   Plan of Care Review   Progress no change   OTHER   Outcome Summary VSS. Sinus tach on monitor and currently on RA. Pt's sats did drop to 84% on RA while sleeping. Complainted of leg pain and nausea frequently throughout shift. No other complaints from pt at this time. Will continue to monitor.

## 2018-12-07 VITALS
HEIGHT: 60 IN | RESPIRATION RATE: 16 BRPM | BODY MASS INDEX: 36.95 KG/M2 | HEART RATE: 94 BPM | OXYGEN SATURATION: 94 % | WEIGHT: 188.2 LBS | DIASTOLIC BLOOD PRESSURE: 78 MMHG | SYSTOLIC BLOOD PRESSURE: 111 MMHG | TEMPERATURE: 99.2 F

## 2018-12-07 LAB
ANION GAP SERPL CALCULATED.3IONS-SCNC: 7 MMOL/L (ref 3–11)
BUN BLD-MCNC: 8 MG/DL (ref 9–23)
BUN/CREAT SERPL: 11.8 (ref 7–25)
CALCIUM SPEC-SCNC: 8.9 MG/DL (ref 8.7–10.4)
CHLORIDE SERPL-SCNC: 102 MMOL/L (ref 99–109)
CO2 SERPL-SCNC: 28 MMOL/L (ref 20–31)
CREAT BLD-MCNC: 0.68 MG/DL (ref 0.6–1.3)
GFR SERPL CREATININE-BSD FRML MDRD: 94 ML/MIN/1.73
GLUCOSE BLD-MCNC: 120 MG/DL (ref 70–100)
GLUCOSE BLDC GLUCOMTR-MCNC: 119 MG/DL (ref 70–130)
GLUCOSE BLDC GLUCOMTR-MCNC: 180 MG/DL (ref 70–130)
MAGNESIUM SERPL-MCNC: 1.9 MG/DL (ref 1.3–2.7)
PHOSPHATE SERPL-MCNC: 3.6 MG/DL (ref 2.4–5.1)
POTASSIUM BLD-SCNC: 3.5 MMOL/L (ref 3.5–5.5)
SODIUM BLD-SCNC: 137 MMOL/L (ref 132–146)

## 2018-12-07 PROCEDURE — 25010000002 PROMETHAZINE PER 50 MG: Performed by: INTERNAL MEDICINE

## 2018-12-07 PROCEDURE — 80048 BASIC METABOLIC PNL TOTAL CA: CPT | Performed by: INTERNAL MEDICINE

## 2018-12-07 PROCEDURE — 83735 ASSAY OF MAGNESIUM: CPT

## 2018-12-07 PROCEDURE — 84100 ASSAY OF PHOSPHORUS: CPT

## 2018-12-07 PROCEDURE — 82962 GLUCOSE BLOOD TEST: CPT

## 2018-12-07 PROCEDURE — 25010000002 ONDANSETRON PER 1 MG: Performed by: NURSE PRACTITIONER

## 2018-12-07 PROCEDURE — 99238 HOSP IP/OBS DSCHRG MGMT 30/<: CPT | Performed by: INTERNAL MEDICINE

## 2018-12-07 PROCEDURE — 25010000002 MORPHINE SULFATE (PF) 2 MG/ML SOLUTION: Performed by: INTERNAL MEDICINE

## 2018-12-07 RX ORDER — FUROSEMIDE 40 MG/1
40 TABLET ORAL DAILY
Qty: 30 TABLET | Refills: 11 | Status: SHIPPED | OUTPATIENT
Start: 2018-12-08 | End: 2018-12-08

## 2018-12-07 RX ORDER — NITROGLYCERIN 0.4 MG/1
0.4 TABLET SUBLINGUAL
Qty: 25 TABLET | Refills: 12 | Status: SHIPPED | OUTPATIENT
Start: 2018-12-07 | End: 2018-12-08

## 2018-12-07 RX ORDER — ASPIRIN 81 MG/1
81 TABLET, CHEWABLE ORAL DAILY
Qty: 30 TABLET | Refills: 11 | Status: SHIPPED | OUTPATIENT
Start: 2018-12-08 | End: 2018-12-08

## 2018-12-07 RX ORDER — ATORVASTATIN CALCIUM 40 MG/1
40 TABLET, FILM COATED ORAL NIGHTLY
Qty: 30 TABLET | Refills: 11 | Status: SHIPPED | OUTPATIENT
Start: 2018-12-07 | End: 2018-12-08

## 2018-12-07 RX ORDER — MAGNESIUM SULFATE HEPTAHYDRATE 40 MG/ML
4 INJECTION, SOLUTION INTRAVENOUS ONCE
Status: DISCONTINUED | OUTPATIENT
Start: 2018-12-07 | End: 2018-12-07 | Stop reason: HOSPADM

## 2018-12-07 RX ORDER — CLOPIDOGREL BISULFATE 75 MG/1
75 TABLET ORAL DAILY
Qty: 30 TABLET | Refills: 11 | Status: SHIPPED | OUTPATIENT
Start: 2018-12-08 | End: 2018-12-08

## 2018-12-07 RX ORDER — SPIRONOLACTONE 25 MG/1
25 TABLET ORAL DAILY
Qty: 30 TABLET | Refills: 11 | Status: SHIPPED | OUTPATIENT
Start: 2018-12-08 | End: 2018-12-08

## 2018-12-07 RX ADMIN — FUROSEMIDE 40 MG: 40 TABLET ORAL at 09:58

## 2018-12-07 RX ADMIN — PANTOPRAZOLE SODIUM 40 MG: 40 TABLET, DELAYED RELEASE ORAL at 06:04

## 2018-12-07 RX ADMIN — ONDANSETRON 4 MG: 2 INJECTION INTRAMUSCULAR; INTRAVENOUS at 10:46

## 2018-12-07 RX ADMIN — ASPIRIN 81 MG 81 MG: 81 TABLET ORAL at 09:58

## 2018-12-07 RX ADMIN — NICOTINE 1 PATCH: 21 PATCH, EXTENDED RELEASE TRANSDERMAL at 09:58

## 2018-12-07 RX ADMIN — SPIRONOLACTONE 25 MG: 25 TABLET ORAL at 09:58

## 2018-12-07 RX ADMIN — METOPROLOL TARTRATE 12.5 MG: 25 TABLET, FILM COATED ORAL at 09:58

## 2018-12-07 RX ADMIN — PROMETHAZINE HYDROCHLORIDE 12.5 MG: 25 INJECTION INTRAMUSCULAR; INTRAVENOUS at 06:42

## 2018-12-07 RX ADMIN — MORPHINE SULFATE 2 MG: 2 INJECTION, SOLUTION INTRAMUSCULAR; INTRAVENOUS at 06:43

## 2018-12-07 RX ADMIN — MORPHINE SULFATE 2 MG: 2 INJECTION, SOLUTION INTRAMUSCULAR; INTRAVENOUS at 10:45

## 2018-12-07 RX ADMIN — SODIUM CHLORIDE, PRESERVATIVE FREE 3 ML: 5 INJECTION INTRAVENOUS at 11:28

## 2018-12-07 RX ADMIN — CLOPIDOGREL BISULFATE 75 MG: 75 TABLET ORAL at 09:58

## 2018-12-07 NOTE — PROGRESS NOTES
Continued Stay Note  [facility]     Patient Name: Jennifer Dey  MRN: [id number]  Today's Date: 12/7/2018    Admit Date: 12/3/2018    Discharge Plan     Row Name 12/07/18 1436       Plan    Plan  Social work called the [facility] pharmacy and checked on the patient being able to get her medications filled because she has Medicaid with [employer] but it is not showing up as active in the pharmacy system.  Social work received permission to get the medication filled.    Patient/Family in Agreement with Plan  yes        Discharge Codes    No documentation.       Expected Discharge Date and Time     Expected Discharge Date Expected Discharge Time    Dec 7, 2018             JASON Cortez

## 2018-12-07 NOTE — PROGRESS NOTES
"[facility] Discharge Note       LOS: 4 days   Patient Care Team:  Provider, No Known as PCP - General    Chief Complaint:  Chest pain    Subjective     Subjective: Nausea gone.  No chest pain.  96% on room air.  Overall feels good would like to go home today.      Review of Systems:   As above.    Medications:    aspirin 81 mg Oral Daily   atorvastatin 40 mg Oral Nightly   clopidogrel 75 mg Oral Daily   furosemide 40 mg Oral Daily   insulin lispro 0-9 Units Subcutaneous 4x Daily With Meals & Nightly   metoprolol tartrate 12.5 mg Oral Q12H   nicotine 1 patch Transdermal Q24H   pantoprazole 40 mg Oral Q AM   pharmacy consult - MTM  Does not apply Daily   sodium chloride 3 mL Intravenous Q12H   spironolactone 25 mg Oral Daily       Objective     Vital Sign Min/Max for last 24 hours  Temp  Min: 97.8 °F (36.6 °C)  Max: 99 °F (37.2 °C)   BP  Min: 103/56  Max: 114/79   Pulse  Min: 100  Max: 116   Resp  Min: 16  Max: 18   SpO2  Min: 91 %  Max: 97 %   Flow (L/min)  Min: 1  Max: 2   Weight  Min: 85.4 kg (188 lb 3.2 oz)  Max: 85.4 kg (188 lb 3.2 oz)    No intake or output data in the 24 hours ending 12/07/18 0719     Flowsheet Rows      First Filed Value   Admission Height  152.4 cm (60\") Documented at 12/03/2018 1800   Admission Weight  81.6 kg (180 lb) Documented at 12/03/2018 1800          Physical Exam:    General: Alert and oriented.   Cardiovascular: Heart has a nondisplaced focal PMI. Tachy regular rate and rhythm without murmur, gallop or rub.  Lungs: Clear bilaterally  Abdomen: Soft, nontender.  Extremities: Show no edema.   Skin: warm and dry.     Results Review:    I reviewed the patient's new clinical results.  EKG:  Tele: Sinus tach at 103    Labs:    Results from last 7 days   Lab Units  12/06/18   0732  12/05/18   0437  12/04/18   0339  12/03/18   2128   SODIUM mmol/L  137  133  135  135  135   POTASSIUM mmol/L  3.9  4.1  4.0  4.7  4.7   CHLORIDE mmol/L  102  102  106  109  109   CO2 mmol/L  27.0  " 23.0  19.0*  16.0*  16.0*   BUN mg/dL  11  11  14  13  13   CREATININE mg/dL  0.62  0.65  0.80  0.81  0.81   CALCIUM mg/dL  8.3*  8.3*  8.8  8.7  8.7   BILIRUBIN mg/dL   --    --    --   0.3   ALK PHOS U/L   --    --    --   91   ALT (SGPT) U/L   --    --    --   76*   AST (SGOT) U/L   --    --    --   292*   GLUCOSE mg/dL  109*  132*  213*  244*  244*     Results from last 7 days   Lab Units  12/05/18   0437  12/04/18   1853  12/04/18   0339  12/03/18   2128   WBC 10*3/mm3  18.93*   --   24.52*  28.96*   HEMOGLOBIN g/dL  11.3*  12.4  13.9  14.6   HEMATOCRIT %  35.7  38.6  42.3  44.4*   PLATELETS 10*3/mm3  311   --   389  469*   MONOCYTES % %   --    --    --   4.0     Lab Results   Component Value Date    TROPONINI >250.000 (C) 12/04/2018    TROPONINI >250.000 (C) 12/03/2018     Lab Results   Component Value Date    CHOL 254 (H) 12/04/2018     Lab Results   Component Value Date    TRIG 230 (H) 12/04/2018     Lab Results   Component Value Date    HDL 30 (L) 12/04/2018     No components found for: LDLCALC  No results found for: INR, PROTIME      Ejection Fraction: 25% with anterior akinesis and apical dyskinesis    Assessment   Assessment:    1. Acute anterior myocardial infarction status post stent placement ×2 using overlapping 3.0 x 18 mm Yvrose drug-eluting stents to the proximal and mid LAD December 3, 2018  2. Brief ventricular tachycardia reported by EMS.  She arrived on an amiodarone drip by ambulance from [facility]  3. Dyslipidemia with a low HDL of 30 and an LDL of 199  4. DM type 2  5. Leukocytosis probably related to stress  6. Tobacco abuse@2 packs per day  7. Bilateral pulmonary nodules measuring up to 6 mm in size by CT scan at OSH 12/3/18.      Plan:    Metoprolol for heart rate control  Lipitor as ordered  Blood pressure too low for ACEI or ARB  Continue aspirin and Plavix  LifeVest for EF estimated at 20-25% prior to discharge    Discharge medications will be aspirin 81 mg by mouth  daily, Lipitor 40 mg by mouth daily, Plavix and 5 mg by mouth daily, Lasix 40 mg by mouth daily, metoprolol 12.5 mg every 12 hours, spironolactone 25 mg by mouth daily.  Nitroglycerin sublingual when necessary    Follow-up with me in one month in [place]      Elen Boothe MD  12/07/18  7:19 AM

## 2018-12-07 NOTE — PLAN OF CARE
Problem: Patient Care Overview  Goal: Plan of Care Review   12/07/18 0444   Coping/Psychosocial   Plan of Care Reviewed With patient   Plan of Care Review   Progress improving   OTHER   Outcome Summary Pt aslelep on 2L O2 most of HS. Zofran/phenergan given for nausea. NSR/ST on monitor. Awaiting Life vest approval.

## 2018-12-07 NOTE — PROGRESS NOTES
Continued Stay Note  [facility]     Patient Name: Jennifer Dey  MRN: [id number]  Today's Date: 12/7/2018    Admit Date: 12/3/2018    Discharge Plan     Row Name 12/07/18 0914       Plan    Plan  Home with daughter    Patient/Family in Agreement with Plan  yes    Plan Comments  Per Angela in [employer], pt has temporary Passport Medicaid good through 1/31/19. [facility] also applied for ongoing coverage for pt. Called [employer]port ID number [id number] to Anamaria at [facility] and she will start PA for lifevest. Pt is agreeable to allow CM arrange a PCP appt in [place]. Appt placed in AVS per Margareth Sorenson. Pt concerned about medication costs. Discussed that now she has Medicaid and will need to see what Medicaid costs will be at d/c. Will have SW see if copays are unaffordable. Pt agreeable to Meds to Beds with order placed. CM will cont to follow.  Johanna Aparicio RN, CM ext. [phone]        Discharge Codes    No documentation.       Expected Discharge Date and Time     Expected Discharge Date Expected Discharge Time    Dec 8, 2018             Johanna Aparicio

## 2018-12-08 ENCOUNTER — READMISSION MANAGEMENT (OUTPATIENT)
Dept: CALL CENTER | Facility: HOSPITAL | Age: 43
End: 2018-12-08

## 2018-12-08 RX ORDER — ATORVASTATIN CALCIUM 40 MG/1
40 TABLET, FILM COATED ORAL NIGHTLY
Qty: 30 TABLET | Refills: 11 | Status: SHIPPED | OUTPATIENT
Start: 2018-12-08 | End: 2019-10-03

## 2018-12-08 RX ORDER — CLOPIDOGREL BISULFATE 75 MG/1
75 TABLET ORAL DAILY
Qty: 30 TABLET | Refills: 11 | Status: SHIPPED | OUTPATIENT
Start: 2018-12-08 | End: 2019-12-30

## 2018-12-08 RX ORDER — ASPIRIN 81 MG/1
81 TABLET, CHEWABLE ORAL DAILY
Qty: 30 TABLET | Refills: 11 | Status: SHIPPED | OUTPATIENT
Start: 2018-12-08 | End: 2019-12-30

## 2018-12-08 RX ORDER — SPIRONOLACTONE 25 MG/1
25 TABLET ORAL DAILY
Qty: 30 TABLET | Refills: 11 | Status: SHIPPED | OUTPATIENT
Start: 2018-12-08 | End: 2019-12-30

## 2018-12-08 RX ORDER — NITROGLYCERIN 0.4 MG/1
0.4 TABLET SUBLINGUAL
Qty: 25 TABLET | Refills: 12 | Status: SHIPPED | OUTPATIENT
Start: 2018-12-08 | End: 2019-11-01 | Stop reason: SDUPTHER

## 2018-12-08 RX ORDER — FUROSEMIDE 40 MG/1
40 TABLET ORAL DAILY
Qty: 30 TABLET | Refills: 11 | Status: SHIPPED | OUTPATIENT
Start: 2018-12-08 | End: 2018-12-11

## 2018-12-09 NOTE — OUTREACH NOTE
Prep Survey      Responses   Facility patient discharged from?  [facility]   Is patient eligible?  Yes   Discharge diagnosis  Anterior STEMI S/P stent X2 LAD   Does the patient have one of the following disease processes/diagnoses(primary or secondary)?  Acute MI (STEMI,NSTEMI)   Does the patient have Home health ordered?  No   Is there a DME ordered?  No   Prep survey completed?  Yes          Yancy Cisse RN

## 2018-12-10 ENCOUNTER — READMISSION MANAGEMENT (OUTPATIENT)
Dept: CALL CENTER | Facility: HOSPITAL | Age: 43
End: 2018-12-10

## 2018-12-10 NOTE — OUTREACH NOTE
AMI Week 1 Survey      Responses   Facility patient discharged from?  [facility]   Does the patient have one of the following disease processes/diagnoses(primary or secondary)?  Acute MI (STEMI,NSTEMI)   Is there a successful TCM telephone encounter documented?  No   Week 1 attempt successful?  No   Unsuccessful attempts  Attempt 1          Milena Abreu RN

## 2018-12-11 ENCOUNTER — TRANSITIONAL CARE MANAGEMENT TELEPHONE ENCOUNTER (OUTPATIENT)
Dept: INTERNAL MEDICINE | Facility: CLINIC | Age: 43
End: 2018-12-11

## 2018-12-11 ENCOUNTER — READMISSION MANAGEMENT (OUTPATIENT)
Dept: CALL CENTER | Facility: HOSPITAL | Age: 43
End: 2018-12-11

## 2018-12-11 ENCOUNTER — OFFICE VISIT (OUTPATIENT)
Dept: INTERNAL MEDICINE | Facility: CLINIC | Age: 43
End: 2018-12-11

## 2018-12-11 VITALS
HEIGHT: 60 IN | BODY MASS INDEX: 36.91 KG/M2 | HEART RATE: 99 BPM | SYSTOLIC BLOOD PRESSURE: 100 MMHG | OXYGEN SATURATION: 100 % | DIASTOLIC BLOOD PRESSURE: 72 MMHG | TEMPERATURE: 98.4 F | WEIGHT: 188 LBS

## 2018-12-11 DIAGNOSIS — Z72.0 TOBACCO ABUSE: ICD-10-CM

## 2018-12-11 DIAGNOSIS — I21.02 ACUTE ST ELEVATION MYOCARDIAL INFARCTION (STEMI) INVOLVING LEFT ANTERIOR DESCENDING (LAD) CORONARY ARTERY (HCC): Primary | ICD-10-CM

## 2018-12-11 DIAGNOSIS — E66.09 CLASS 2 OBESITY DUE TO EXCESS CALORIES WITHOUT SERIOUS COMORBIDITY WITH BODY MASS INDEX (BMI) OF 36.0 TO 36.9 IN ADULT: ICD-10-CM

## 2018-12-11 DIAGNOSIS — F17.200 CURRENT SMOKER: ICD-10-CM

## 2018-12-11 DIAGNOSIS — I21.02 STEMI INVOLVING LEFT ANTERIOR DESCENDING CORONARY ARTERY (HCC): ICD-10-CM

## 2018-12-11 DIAGNOSIS — F41.9 ANXIETY: ICD-10-CM

## 2018-12-11 PROCEDURE — 99496 TRANSJ CARE MGMT HIGH F2F 7D: CPT | Performed by: INTERNAL MEDICINE

## 2018-12-11 RX ORDER — NICOTINE 21 MG/24HR
1 PATCH, TRANSDERMAL 24 HOURS TRANSDERMAL EVERY 24 HOURS
Qty: 28 EACH | Refills: 0 | Status: SHIPPED | OUTPATIENT
Start: 2018-12-11 | End: 2019-03-07

## 2018-12-11 RX ORDER — HYDROXYZINE HYDROCHLORIDE 25 MG/1
25 TABLET, FILM COATED ORAL 3 TIMES DAILY PRN
Qty: 45 TABLET | Refills: 2 | Status: SHIPPED | OUTPATIENT
Start: 2018-12-11 | End: 2019-03-07

## 2018-12-11 RX ORDER — SERTRALINE HYDROCHLORIDE 25 MG/1
25 TABLET, FILM COATED ORAL DAILY
Qty: 30 TABLET | Refills: 2 | Status: SHIPPED | OUTPATIENT
Start: 2018-12-11 | End: 2019-03-08 | Stop reason: SDUPTHER

## 2018-12-11 RX ORDER — FUROSEMIDE 40 MG/1
20 TABLET ORAL DAILY
Qty: 30 TABLET | Refills: 11 | Status: SHIPPED | OUTPATIENT
Start: 2018-12-11 | End: 2019-12-30

## 2018-12-11 NOTE — PAYOR COMM NOTE
"Jennifer Kruger (43 y.o. Female)     Date of Birth Social Security Number Address Home Phone MRN    [id number]   [address] [place] [address] [phone] [id number]    Holiness Marital Status          Non-Taoist        Admission Date Admission Type Admitting Provider Attending Provider Department, Room/Bed    12/3/18 Urgent Elen Boothe MD  [facility] 6A, N613/1    Discharge Date Discharge Disposition Discharge Destination        12/7/2018 Home or Self Care              Attending Provider:  (none)   Allergies:  No Known Allergies    Isolation:  None   Infection:  None   Code Status:  Prior    Ht:  152.4 cm (60\")   Wt:  85.4 kg (188 lb 3.2 oz)    Admission Cmt:  None   Principal Problem:  Anterior STEMI S/P stent X2 LAD per Dr. Boothe on 12/3 [I21.02]                 Active Insurance as of 12/3/2018     Primary Coverage     Payor Plan Insurance Group Employer/Plan Group    PASSPORT PASSPORT MEDICAID     Payor Plan Address Payor Plan Phone Number Payor Plan Fax Number Effective Dates    [facility] [address] [phone]  11/1/1997 - None Entered    [facility] [phone]       Subscriber Name Subscriber Birth Date Member ID       JENNIFER KRUGER 1975 [id number]           Secondary Coverage     Payor Plan Insurance Group Employer/Plan Group    [place]      Payor Plan Address Payor Plan Phone Number Payor Plan Fax Number Effective Dates    [facility]   12/3/2018 - None Entered    Subscriber Name Subscriber Birth Date Member ID       JENNIFER KRUGER 1975 PENDING                 Emergency Contacts      (Rel.) Home Phone Work Phone Mobile Phone    Margoth Biswas (Daughter) -- -- [phone]    GomesBipin bentleyn (Daughter) -- -- [phone]               History & Physical      Elen Boothe MD at 12/3/2018  7:30 PM          [facility] Consult Note      Referring Provider: " Elen Boothe,*  Primary Provider:  Provider, No Known  Reason for Consultation: Acute STEMI    Patient Care Team:  Provider, No Known as PCP - General    Chief complaint:  Acute STEMI    Identification:  43 year old female     Problem list:    1.  Acute STEMI 12/3/18  2.  Tobacco abuse:  2 PPD        Allergies:  Patient has no known allergies.    Home/Current Medications:    No home medications      History of present illness:    Patient is a 43-year-old female with the above-noted medical history was transferred from [facility] emergency department [facility] for acute STEMI.  She apparently developed an acute onset of sharp chest pain while cleaning her house.  There was associated nausea and vomiting as well as shortness of breath.  Brief VT was reported by EMS and she arrived on an amiodarone drip from outlying facility.  EKG revealed 6mm ST elevation in leads V2 through V4.  Prior to transfer, she received morphine 4 mg ×2, amiodarone bolus and drip, aspirin 324 mg, diltiazem 10 mg IV, Plavix 600 mg by mouth and one sublingual nitroglycerin 0.4 mg    Emergent cardiac catheterization was performed.    Cardiac Risk Factors:  Tobacco abuse, sedentary lifestyle, diabetes, family history    Cardiac Testing:  None    Social History:  Social History     Socioeconomic History   • Marital status:      Spouse name: Not on file   • Number of children: Not on file   • Years of education: Not on file   • Highest education level: Not on file   Social Needs   • Financial resource strain: Not on file   • Food insecurity - worry: Not on file   • Food insecurity - inability: Not on file   • Transportation needs - medical: Not on file   • Transportation needs - non-medical: Not on file   Occupational History   • Not on file   Tobacco Use   • Smoking status: Current Every Day Smoker     Packs/day: 2.00   • Smokeless tobacco: Never Used   Substance and Sexual Activity   • Alcohol use: Not on  "file   • Drug use: No   • Sexual activity: Defer   Other Topics Concern   • Not on file   Social History Narrative   • Not on file     Family History:  Family History   Problem Relation Age of Onset   • Heart disease Mother    • Heart disease Father      Review of Systems  Pertinent positives are listed in the HPI.  All other systems reviewed are negative.         Objective     Vital Sign Min/Max for last 24 hours  Temp  Min: 97.6 °F (36.4 °C)  Max: 98.6 °F (37 °C)   BP  Min: 86/60  Max: 140/85   Pulse  Min: 93  Max: 124   Resp  Min: 18  Max: 42   SpO2  Min: 80 %  Max: 100 %   No Data Recorded   Weight  Min: 81.6 kg (180 lb)  Max: 81.6 kg (180 lb)     Flowsheet Rows      First Filed Value   Admission Height  152.4 cm (60\") Documented at 12/03/2018 1800   Admission Weight  81.6 kg (180 lb) Documented at 12/03/2018 1800          Physical Exam:    GENERAL: well-developed, well-nourished; very anxious   NECK:   Carotid upstrokes are 2+ and  symmetrical without bruits.   LUNGS: Rales throughout to auscultation bilaterally without wheezing or rhonchi CARDIOVASCULAR: The heart has a tachycardic regular rate with a normal S1 and S2. There is no murmur, gallop, rub, or click appreciated. The PMI is nondisplaced.   ABDOMEN: Soft and nontender  NEUROLOGICAL: Nonfocal; Alert and oriented  PERIPHERAL VASCULAR:  Posterior tibial pulses are 2+ and symmetrical. There is no peripheral edema.   SKIN:  Warm and dry  PSYCHIATRIC: anxious; behavior appropriate     EKG:  Sinus tach    Echo: none performed    Labs:      Results from last 7 days   Lab Units  12/04/18   0339  12/03/18   2128   SODIUM mmol/L  135  135  135   POTASSIUM mmol/L  4.0  4.7  4.7   CHLORIDE mmol/L  106  109  109   CO2 mmol/L  19.0*  16.0*  16.0*   BUN mg/dL  14  13  13   CREATININE mg/dL  0.80  0.81  0.81   CALCIUM mg/dL  8.8  8.7  8.7   BILIRUBIN mg/dL   --   0.3   ALK PHOS U/L   --   91   ALT (SGPT) U/L   --   76*   AST (SGOT) U/L   --   292*   GLUCOSE " mg/dL  213*  244*  244*       Lab Results (last 24 hours)     Procedure Component Value Units Date/Time    Troponin [[id number]]  (Abnormal) Collected:  12/04/18 0339    Specimen:  Blood Updated:  12/04/18 0524     Troponin I >250.000 ng/mL      Comment: Results may be falsely decreased if patient taking Biotin.       Lipid Panel [[id number]]  (Abnormal) Collected:  12/04/18 0339    Specimen:  Blood Updated:  12/04/18 0520     Total Cholesterol 254 mg/dL      Triglycerides 230 mg/dL      HDL Cholesterol 30 mg/dL      LDL Cholesterol  199 mg/dL     TSH [[id number]]  (Normal) Collected:  12/04/18 0339    Specimen:  Blood Updated:  12/04/18 0520     TSH 0.831 mIU/mL     Magnesium [[id number]]  (Normal) Collected:  12/04/18 0339    Specimen:  Blood Updated:  12/04/18 0520     Magnesium 1.9 mg/dL     Phosphorus [[id number]]  (Abnormal) Collected:  12/04/18 0339    Specimen:  Blood Updated:  12/04/18 0520     Phosphorus 2.2 mg/dL     Lactic Acid, Reflex [[id number]]  (Abnormal) Collected:  12/04/18 0339    Specimen:  Blood Updated:  12/04/18 0450     Lactate 4.1 mmol/L      Comment: Falsely depressed results may occur on samples drawn from patients receiving N-Acetylcysteine (NAC) or Metamizole.       CBC (No Diff) [[id number]]  (Abnormal) Collected:  12/04/18 0339    Specimen:  Blood Updated:  12/04/18 0411     WBC 24.52 10*3/mm3      RBC 4.43 10*6/mm3      Hemoglobin 13.9 g/dL      Hematocrit 42.3 %      MCV 95.5 fL      MCH 31.4 pg      MCHC 32.9 g/dL      RDW 14.0 %      RDW-SD 48.6 fl      MPV 10.1 fL      Platelets 389 10*3/mm3     Lactic Acid, Reflex Timer (This will reflex a repeat order 3-3:15 hours after ordered.) [[id number]] Collected:  12/03/18 2158    Specimen:  Blood Updated:  12/04/18 0145     Extra Tube Hold for add-ons.     Comment: Auto resulted.       POC Activated Clotting Time [[id number]]  (Normal) Collected:  12/04/18 0149    Specimen:  Blood Updated:  12/04/18 0106     Activated Clotting Time  142 Seconds       Comment: Serial Number: [id number]:  [id number]       POC Activated Clotting Time [[id number]]  (Abnormal) Collected:  12/04/18 0048    Specimen:  Blood Updated:  12/04/18 0106     Activated Clotting Time  153 Seconds      Comment: Serial Number: [id number]:  [id number]       POC Glucose Once [[id number]]  (Abnormal) Collected:  12/03/18 2334    Specimen:  Blood Updated:  12/03/18 2355     Glucose 232 mg/dL     Pregnancy, Urine - Urine, Clean Catch [[id number]]  (Normal) Collected:  12/03/18 2329    Specimen:  Urine, Clean Catch Updated:  12/03/18 2344     HCG, Urine QL Negative    Troponin [[id number]]  (Abnormal) Collected:  12/03/18 2128    Specimen:  Blood Updated:  12/03/18 2241     Troponin I >250.000 ng/mL      Comment: Results may be falsely decreased if patient taking Biotin.       CBC Auto Differential [[id number]]  (Abnormal) Collected:  12/03/18 2128    Specimen:  Blood Updated:  12/03/18 2241     WBC 28.96 10*3/mm3      RBC 4.68 10*6/mm3      Hemoglobin 14.6 g/dL      Hematocrit 44.4 %      MCV 94.9 fL      MCH 31.2 pg      MCHC 32.9 g/dL      RDW 14.0 %      RDW-SD 48.4 fl      MPV 10.5 fL      Platelets 469 10*3/mm3     Manual Differential [[id number]]  (Abnormal) Collected:  12/03/18 2128    Specimen:  Blood Updated:  12/03/18 2241     Neutrophil % 84.0 %      Lymphocyte % 6.0 %      Monocyte % 4.0 %      Eosinophil % 0.0 %      Basophil % 0.0 %      Bands %  6.0 %      Neutrophils Absolute 26.06 10*3/mm3      Lymphocytes Absolute 1.74 10*3/mm3      Monocytes Absolute 1.16 10*3/mm3      Eosinophils Absolute 0.00 10*3/mm3      Basophils Absolute 0.00 10*3/mm3      RBC Morphology Normal     WBC Morphology Normal     Platelet Morphology Normal    Magnesium [[id number]]  (Normal) Collected:  12/03/18 2128    Specimen:  Blood Updated:  12/03/18 2238     Magnesium 2.1 mg/dL     Phosphorus [[id number]]  (Normal) Collected:  12/03/18 2128    Specimen:  Blood Updated:  12/03/18 2238     Phosphorus 4.2 mg/dL      BNP [[id number]]  (Normal) Collected:  12/03/18 2158    Specimen:  Blood Updated:  12/03/18 2238     BNP 42.0 pg/mL      Comment: Results may be falsely decreased if patient taking Biotin.       Lactic Acid, Plasma [[id number]]  (Abnormal) Collected:  12/03/18 2158    Specimen:  Blood Updated:  12/03/18 2233     Lactate 3.9 mmol/L      Comment: Falsely depressed results may occur on samples drawn from patients receiving N-Acetylcysteine (NAC) or Metamizole.       Hemoglobin A1c [[id number]]  (Abnormal) Collected:  12/03/18 2128    Specimen:  Blood Updated:  12/03/18 2151     Hemoglobin A1C 6.90 %     Narrative:       The American Diabetes Association recommends maintenance of Hemoglobin A1C at 7.0% or lower. Goals for Hemoglobin A1C reduction may need to be modified if hypoglycemia is a problem.    Blood Gas, Arterial With Co-Ox [[id number]]  (Abnormal) Collected:  12/03/18 2044    Specimen:  Arterial Blood Updated:  12/03/18 2053     Site Arterial Line     Julio C's Test N/A     pH, Arterial 7.283 pH units      Comment: 84 Value below reference range        pCO2, Arterial 36.0 mm Hg      pO2, Arterial 140.0 mm Hg      Comment: 83 Value above reference range        HCO3, Arterial 17.0 mmol/L      Base Excess, Arterial -8.8 mmol/L      Hemoglobin, Blood Gas 14.5 g/dL      Hematocrit, Blood Gas 44.6 %      Oxyhemoglobin 97.4 %      Methemoglobin 0.60 %      Carboxyhemoglobin 1.2 %      Temperature 37.0 C      Barometric Pressure for Blood Gas -- mmHg      Comment: N/A        Modality BiPap     FIO2 100 %      Ventilator Mode       Comment: Meter: [id number]     :  [id number]        Note --     pH, Temp Corrected 7.283 pH Units      pCO2, Temperature Corrected 36.0 mm Hg      pO2, Temperature Corrected 140 mm Hg         Lab Results   Component Value Date    TROPONINI >250.000 (C) 12/04/2018    TROPONINI >250.000 (C) 12/03/2018            Ejection Fraction:  Unknown at time of arrival      Results Review:  I  reviewed the patients new clinical results.      Assessment:  1. Acute anterior STEMI  2. Tobacco Abuse  3. Family history CAD      Plan:    Emergent left heart catheterization with Dr. Elen Boothe.  Dual antiplatelet therapy times one year  Statin beta blocker and ACE inhibitor will be initiated  Admitted to ICU postprocedure    I discussed the patients findings and my recommendations with patient.    Scribed for Elen Boothe MD by PATRICIA Delgado on 12/03/2018 at 8:24 AM    PATRICIA Chery  12/04/18  8:24 AM    I Elen Boothe MD personally performed the services described in this documentation as scribed by the above individual in my presence, and it is both accurate and complete.    Elen Boothe MD, FACC          Electronically signed by Elen Boothe MD at 12/5/2018 10:12 AM          Physician Progress Notes (all)      Elen Boothe MD at 12/7/2018  7:18 AM          [facility] Discharge Note       LOS: 4 days   Patient Care Team:  Provider, No Known as PCP - General    Chief Complaint:  Chest pain    Subjective     Subjective: Nausea gone.  No chest pain.  96% on room air.  Overall feels good would like to go home today.      Review of Systems:   As above.    Medications:    aspirin 81 mg Oral Daily   atorvastatin 40 mg Oral Nightly   clopidogrel 75 mg Oral Daily   furosemide 40 mg Oral Daily   insulin lispro 0-9 Units Subcutaneous 4x Daily With Meals & Nightly   metoprolol tartrate 12.5 mg Oral Q12H   nicotine 1 patch Transdermal Q24H   pantoprazole 40 mg Oral Q AM   pharmacy consult - MTM  Does not apply Daily   sodium chloride 3 mL Intravenous Q12H   spironolactone 25 mg Oral Daily       Objective     Vital Sign Min/Max for last 24 hours  Temp  Min: 97.8 °F (36.6 °C)  Max: 99 °F (37.2 °C)   BP  Min: 103/56  Max: 114/79   Pulse  Min: 100  Max: 116   Resp  Min: 16  Max: 18   SpO2  Min: 91 %  Max: 97 %   Flow (L/min)  Min: 1  Max: 2   Weight   "Min: 85.4 kg (188 lb 3.2 oz)  Max: 85.4 kg (188 lb 3.2 oz)    No intake or output data in the 24 hours ending 12/07/18 0719     Flowsheet Rows      First Filed Value   Admission Height  152.4 cm (60\") Documented at 12/03/2018 1800   Admission Weight  81.6 kg (180 lb) Documented at 12/03/2018 1800          Physical Exam:    General: Alert and oriented.   Cardiovascular: Heart has a nondisplaced focal PMI. Tachy regular rate and rhythm without murmur, gallop or rub.  Lungs: Clear bilaterally  Abdomen: Soft, nontender.  Extremities: Show no edema.   Skin: warm and dry.     Results Review:    I reviewed the patient's new clinical results.  EKG:  Tele: Sinus tach at 103    Labs:    Results from last 7 days   Lab Units  12/06/18   0732  12/05/18   0437 12/04/18 0339 12/03/18 2128   SODIUM mmol/L  137  133  135  135  135   POTASSIUM mmol/L  3.9  4.1  4.0  4.7  4.7   CHLORIDE mmol/L  102  102  106  109  109   CO2 mmol/L  27.0  23.0  19.0*  16.0*  16.0*   BUN mg/dL  11  11  14  13  13   CREATININE mg/dL  0.62  0.65  0.80  0.81  0.81   CALCIUM mg/dL  8.3*  8.3*  8.8  8.7  8.7   BILIRUBIN mg/dL   --    --    --   0.3   ALK PHOS U/L   --    --    --   91   ALT (SGPT) U/L   --    --    --   76*   AST (SGOT) U/L   --    --    --   292*   GLUCOSE mg/dL  109*  132*  213*  244*  244*     Results from last 7 days   Lab Units  12/05/18   0437  12/04/18   1853  12/04/18 0339 12/03/18 2128   WBC 10*3/mm3  18.93*   --   24.52*  28.96*   HEMOGLOBIN g/dL  11.3*  12.4  13.9  14.6   HEMATOCRIT %  35.7  38.6  42.3  44.4*   PLATELETS 10*3/mm3  311   --   389  469*   MONOCYTES % %   --    --    --   4.0     Lab Results   Component Value Date    TROPONINI >250.000 (C) 12/04/2018    TROPONINI >250.000 (C) 12/03/2018     Lab Results   Component Value Date    CHOL 254 (H) 12/04/2018     Lab Results   Component Value Date    TRIG 230 (H) 12/04/2018     Lab Results   Component Value Date    HDL 30 (L) 12/04/2018     No components " found for: LDLCALC  No results found for: INR, PROTIME      Ejection Fraction: 25% with anterior akinesis and apical dyskinesis    Assessment   Assessment:    4. Acute anterior myocardial infarction status post stent placement ×2 using overlapping 3.0 x 18 mm Yvrose drug-eluting stents to the proximal and mid LAD December 3, 2018  5. Brief ventricular tachycardia reported by EMS.  She arrived on an amiodarone drip by ambulance from [facility]  6. Dyslipidemia with a low HDL of 30 and an LDL of 199  7. DM type 2  8. Leukocytosis probably related to stress  9. Tobacco abuse@2 packs per day  10. Bilateral pulmonary nodules measuring up to 6 mm in size by CT scan at OSH 12/3/18.      Plan:    Metoprolol for heart rate control  Lipitor as ordered  Blood pressure too low for ACEI or ARB  Continue aspirin and Plavix  LifeVest for EF estimated at 20-25% prior to discharge    Discharge medications will be aspirin 81 mg by mouth daily, Lipitor 40 mg by mouth daily, Plavix and 5 mg by mouth daily, Lasix 40 mg by mouth daily, metoprolol 12.5 mg every 12 hours, spironolactone 25 mg by mouth daily.  Nitroglycerin sublingual when necessary    Follow-up with me in one month in [place]      Elen Boothe MD  12/07/18  7:19 AM            Electronically signed by Elen Boothe MD at 12/7/2018  2:23 PM     Elen Boothe MD at 12/6/2018  6:19 AM          [facility] Daily Note       LOS: 3 days   Patient Care Team:  Provider, No Known as PCP - General    Chief Complaint:  Chest pain    Subjective     Subjective: Still somewhat nauseated but apparently this goes along with her menstrual cycle.  She feels less short of breath but is only 92% on 3.5 L of oxygen.  She still has some soreness in her chest..      Review of Systems:   As above.    Medications:    aspirin 81 mg Oral Daily   atorvastatin 40 mg Oral Nightly   clopidogrel 75 mg Oral Daily   insulin lispro 0-9 Units Subcutaneous 4x  "Daily With Meals & Nightly   lisinopril 2.5 mg Oral Q24H   metoprolol tartrate 12.5 mg Oral Q12H   nicotine 1 patch Transdermal Q24H   pantoprazole 40 mg Oral Q AM   pharmacy consult - MTM  Does not apply Daily   sodium chloride 3 mL Intravenous Q12H       Objective     Vital Sign Min/Max for last 24 hours  Temp  Min: 98.1 °F (36.7 °C)  Max: 99.2 °F (37.3 °C)   BP  Min: 90/55  Max: 119/78   Pulse  Min: 98  Max: 118   Resp  Min: 16  Max: 24   SpO2  Min: 89 %  Max: 98 %   Flow (L/min)  Min: 2.5  Max: 4   Weight  Min: 81.6 kg (180 lb)  Max: 87.1 kg (192 lb)      Intake/Output Summary (Last 24 hours) at 12/6/2018 0619  Last data filed at 12/6/2018 0340  Gross per 24 hour   Intake 1232 ml   Output 1960 ml   Net -728 ml        Flowsheet Rows      First Filed Value   Admission Height  152.4 cm (60\") Documented at 12/03/2018 1800   Admission Weight  81.6 kg (180 lb) Documented at 12/03/2018 1800          Physical Exam:    General: Alert and oriented.   Cardiovascular: Heart has a nondisplaced focal PMI. Tachy regular rate and rhythm without murmur, gallop or rub.  Lungs: Few basilar rales are heard  Abdomen: Soft, nontender.  Extremities: Show no edema.   Skin: warm and dry.     Results Review:    I reviewed the patient's new clinical results.  EKG:  Tele: Sinus tach at 111    Labs:    Results from last 7 days   Lab Units  12/05/18   0437  12/04/18   0339  12/03/18   2128   SODIUM mmol/L  133  135  135  135   POTASSIUM mmol/L  4.1  4.0  4.7  4.7   CHLORIDE mmol/L  102  106  109  109   CO2 mmol/L  23.0  19.0*  16.0*  16.0*   BUN mg/dL  11  14  13  13   CREATININE mg/dL  0.65  0.80  0.81  0.81   CALCIUM mg/dL  8.3*  8.8  8.7  8.7   BILIRUBIN mg/dL   --    --   0.3   ALK PHOS U/L   --    --   91   ALT (SGPT) U/L   --    --   76*   AST (SGOT) U/L   --    --   292*   GLUCOSE mg/dL  132*  213*  244*  244*     Results from last 7 days   Lab Units  12/05/18   0437  12/04/18   1853  12/04/18   0339  12/03/18 2128   WBC " 10*3/mm3  18.93*   --   24.52*  28.96*   HEMOGLOBIN g/dL  11.3*  12.4  13.9  14.6   HEMATOCRIT %  35.7  38.6  42.3  44.4*   PLATELETS 10*3/mm3  311   --   389  469*   MONOCYTES % %   --    --    --   4.0     Lab Results   Component Value Date    TROPONINI >250.000 (C) 12/04/2018    TROPONINI >250.000 (C) 12/03/2018     Lab Results   Component Value Date    CHOL 254 (H) 12/04/2018     Lab Results   Component Value Date    TRIG 230 (H) 12/04/2018     Lab Results   Component Value Date    HDL 30 (L) 12/04/2018     No components found for: LDLCALC  No results found for: INR, PROTIME      Ejection Fraction: 25% with anterior akinesis and apical dyskinesis    Assessment   Assessment:    11. Acute anterior myocardial infarction status post stent placement ×2 using overlapping 3.0 x 18 mm Yvrose drug-eluting stents to the proximal and mid LAD December 3, 2018  12. Brief ventricular tachycardia reported by EMS.  She arrived on an amiodarone drip by ambulance from [facility]  13. Dyslipidemia with a low HDL of 30 and an LDL of 199  14. DM type 2  15. Leukocytosis probably related to stress  16. Tobacco abuse@2 packs per day  17. Bilateral pulmonary nodules measuring up to 6 mm in size by CT scan at OSH 12/3/18.      Plan:    A digoxin ×1 dose to see if this helps with heart rate control.  Ideally Corlanor would be a good drug for her but I know that she'll be unable to afford it.  Metoprolol for heart rate control  Lipitor as ordered  Blood pressure too low for ACEI or ARB  Continue aspirin and Plavix  Patient may shower daily  LifeVest at time of discharge for EF estimated at 20-25%  Lasix 40 mg by mouth daily  Spironolactone 25 mg by mouth daily  BMP in a.m.      Elen Boothe MD  12/06/18  6:19 AM            Electronically signed by Elen Boothe MD at 12/6/2018  6:30 AM     Elen Boothe MD at 12/5/2018  7:23 AM          [facility] Daily Note       LOS: 2 days   Patient Care  "Team:  Provider, No Known as PCP - General    Chief Complaint:  Chest pain    Subjective     Subjective: Still nauseated this morning and very sore in her chest.  She does note that her breathing is a little bit better.      Review of Systems:   As above.    Medications:    aspirin 81 mg Oral Daily   atorvastatin 40 mg Oral Nightly   clopidogrel 75 mg Oral Daily   famotidine 20 mg Intravenous Q12H   insulin lispro 0-9 Units Subcutaneous 4x Daily With Meals & Nightly   lisinopril 2.5 mg Oral Q24H   metoprolol tartrate 12.5 mg Oral Q12H   nicotine 1 patch Transdermal Q24H   pantoprazole 40 mg Intravenous Q12H   sodium chloride 3 mL Intravenous Q12H       Objective     Vital Sign Min/Max for last 24 hours  Temp  Min: 98.4 °F (36.9 °C)  Max: 99.2 °F (37.3 °C)   BP  Min: 88/65  Max: 136/74   Pulse  Min: 95  Max: 125   Resp  Min: 16  Max: 22   SpO2  Min: 88 %  Max: 100 %   Flow (L/min)  Min: 2  Max: 4   No Data Recorded      Intake/Output Summary (Last 24 hours) at 12/5/2018 0723  Last data filed at 12/5/2018 0600  Gross per 24 hour   Intake 1736 ml   Output 1100 ml   Net 636 ml        Flowsheet Rows      First Filed Value   Admission Height  152.4 cm (60\") Documented at 12/03/2018 1800   Admission Weight  81.6 kg (180 lb) Documented at 12/03/2018 1800          Physical Exam:    General: Alert and oriented.   Cardiovascular: Heart has a nondisplaced focal PMI. Tachy regular rate and rhythm without murmur, gallop or rub.  Lungs: Few basilar rales are heard  Abdomen: Soft, nontender.  Extremities: Show no edema.   Skin: warm and dry.     Results Review:    I reviewed the patient's new clinical results.  EKG:  Tele: Sinus tach at 111    Labs:    Results from last 7 days   Lab Units  12/05/18   0437  12/04/18   0339  12/03/18   2128   SODIUM mmol/L  133  135  135  135   POTASSIUM mmol/L  4.1  4.0  4.7  4.7   CHLORIDE mmol/L  102  106  109  109   CO2 mmol/L  23.0  19.0*  16.0*  16.0*   BUN mg/dL  11  14  13  13 "   CREATININE mg/dL  0.65  0.80  0.81  0.81   CALCIUM mg/dL  8.3*  8.8  8.7  8.7   BILIRUBIN mg/dL   --    --   0.3   ALK PHOS U/L   --    --   91   ALT (SGPT) U/L   --    --   76*   AST (SGOT) U/L   --    --   292*   GLUCOSE mg/dL  132*  213*  244*  244*     Results from last 7 days   Lab Units  12/05/18   0437  12/04/18   1853  12/04/18   0339  12/03/18   2128   WBC 10*3/mm3  18.93*   --   24.52*  28.96*   HEMOGLOBIN g/dL  11.3*  12.4  13.9  14.6   HEMATOCRIT %  35.7  38.6  42.3  44.4*   PLATELETS 10*3/mm3  311   --   389  469*   MONOCYTES % %   --    --    --   4.0     Lab Results   Component Value Date    TROPONINI >250.000 (C) 12/04/2018    TROPONINI >250.000 (C) 12/03/2018     Lab Results   Component Value Date    CHOL 254 (H) 12/04/2018     Lab Results   Component Value Date    TRIG 230 (H) 12/04/2018     Lab Results   Component Value Date    HDL 30 (L) 12/04/2018     No components found for: LDLCALC  No results found for: INR, PROTIME      Ejection Fraction: 25% with anterior akinesis and apical dyskinesis    Assessment   Assessment:    18. Acute anterior myocardial infarction status post stent placement ×2 using overlapping 3.0 x 18 mm Yvrose drug-eluting stents to the proximal and mid LAD December 3, 2018  19. Brief ventricular tachycardia reported by EMS.  She arrived on an amiodarone drip by ambulance from [facility]  20. Dyslipidemia with a low HDL of 30 and an LDL of 199  21. DM type 2  22. Leukocytosis probably related to stress  23. Tobacco abuse@2 packs per day  24. Bilateral pulmonary nodules measuring up to 6 mm in size by CT scan at OSH 12/3/18.      Plan:    To telemetry   Echocardiogram today to reevaluate LVEF    assistance for financial support  Metoprolol for heart rate control  Lipitor as ordered  Lisinopril for LV dysfunction  Continue aspirin and Plavix  Patient may shower daily  LifeVest at time of discharge unless EF is markedly improved by echo tomorrow  Phenergan  for nausea as Zofran is not very helpful  20 mg IV Iggyix      Elen Boothe MD  12/05/18  7:23 AM            Electronically signed by Elen Boothe MD at 12/5/2018  7:37 AM     Beto Phillips MD at 12/4/2018  4:29 PM          Intensive Care Follow-up     Hospital:  LOS: 1 day   Ms. Jennifer Dey, 43 y.o. female is followed for:   Acute ST elevation myocardial infarction (STEMI) involving left anterior descending (LAD) coronary artery (CMS/formerly [facility])     Subjective   Subjective   Interval History:  The chart is been reviewed. The patient states that she is still having some chest pain intermittently. She is also having some nausea and vomiting. It was reported to me that she had some coffee-ground emesis however this has cleared up and there has been no bright red blood. Hemoglobin levels have been stable.    The patient's relevant past medical, surgical and social history were reviewed and updated in Epic as appropriate.     Objective   Objective     Infusions:    lactated ringers 50 mL/hr Last Rate: 50 mL/hr (12/04/18 0510)   norepinephrine 0.02-0.3 mcg/kg/min Last Rate: Stopped (12/04/18 0241)     Medications:    aspirin 81 mg Oral Daily   atorvastatin 40 mg Oral Nightly   clopidogrel 75 mg Oral Daily   famotidine 20 mg Intravenous Q12H   insulin lispro 0-9 Units Subcutaneous 4x Daily With Meals & Nightly   lisinopril 2.5 mg Oral Q24H   metoprolol tartrate 12.5 mg Oral Q12H   nicotine 1 patch Transdermal Q24H   pantoprazole 40 mg Intravenous Q12H   sodium chloride 3 mL Intravenous Q12H       Vital Sign Min/Max for last 24 hours  Temp  Min: 97.6 °F (36.4 °C)  Max: 99.2 °F (37.3 °C)   BP  Min: 86/60  Max: 140/85   Pulse  Min: 93  Max: 129   Resp  Min: 16  Max: 42   SpO2  Min: 80 %  Max: 100 %   Flow (L/min)  Min: 2  Max: 4       Input/Output for last 24 hour shift  12/03 0701 - 12/04 0700  In: 139.8 [I.V.:139.8]  Out: 800 [Urine:800]   FiO2 (%):  [40 %-100 %] 40 %  S RR:  [10-12]  "10  Objective:  General Appearance:  Uncomfortable, ill-appearing, in no acute distress and not in pain.    Vital signs: (most recent): Blood pressure 95/61, pulse 111, temperature 98.7 °F (37.1 °C), resp. rate 18, height 152.4 cm (60\"), weight 81.6 kg (180 lb), SpO2 92 %.  No fever.    HEENT: Normal HEENT exam.    Lungs:  Normal effort and normal respiratory rate.  Breath sounds clear to auscultation.  She is not in respiratory distress.  No decreased breath sounds, wheezes or rhonchi.    Heart: Tachycardia.  Regular rhythm.  S1 normal and S2 normal.  No murmur.   Chest: Symmetric chest wall expansion.   Abdomen: Abdomen is soft and non-distended.  Bowel sounds are normal.   There is no abdominal tenderness.     Extremities: Normal range of motion.  There is no deformity.    Neurological: Patient is alert.    Pupils:  Pupils are equal, round, and reactive to light.  Pupils are equal.   Skin:  Warm.  No rash or cyanosis.             Results from last 7 days   Lab Units  12/04/18   0339  12/03/18 2128   WBC 10*3/mm3  24.52*  28.96*   HEMOGLOBIN g/dL  13.9  14.6   PLATELETS 10*3/mm3  389  469*     Results from last 7 days   Lab Units  12/04/18   0339  12/03/18 2128   SODIUM mmol/L  135  135  135   POTASSIUM mmol/L  4.0  4.7  4.7   CO2 mmol/L  19.0*  16.0*  16.0*   BUN mg/dL  14  13  13   CREATININE mg/dL  0.80  0.81  0.81   MAGNESIUM mg/dL  1.9  2.1   PHOSPHORUS mg/dL  2.2*  4.2   GLUCOSE mg/dL  213*  244*  244*     Estimated Creatinine Clearance: 84 mL/min (by C-G formula based on SCr of 0.8 mg/dL).    Results from last 7 days   Lab Units  12/03/18   2044   PH, ARTERIAL pH units  7.283*   PCO2, ARTERIAL mm Hg  36.0   PO2 ART mm Hg  140.0*         I reviewed the patient's results and images.     Assessment/Plan   Impression        Anterior STEMI S/P stent X2 LAD per Dr. Boothe on 12/3    Current smoker    Bilateral pulmonary nodules    Class 2 obesity in adult    STEMI involving left anterior " descending coronary artery (CMS/[facility])       Plan        Antinausea medicine as needed.  Insulin correction has been started.  We will watch her hemoglobin levels which have been stable. It is unclear to me whether or not she actually did have signs of GI bleeding although her recent vomitus has not contained coffee grounds.  At some point after discharge she will need to have an evaluation of her pulmonary nodules. I will plan to look at her outside hospital film which by report showed only 6 mm nodules.  Wean oxygen as tolerated.    Plan of care and goals reviewed with mulitdisciplinary team at daily rounds.   I discussed the patient's findings and my recommendations with patient and nursing staff       Beto Phillips MD, [facility]  Pulmonary and Critical Care Medicine  12/04/18 4:30 PM       Electronically signed by Beto Phillips MD at 12/4/2018  4:36 PM     Elen Boothe MD at 12/4/2018  7:48 AM          [facility] Daily Note       LOS: 1 day   Patient Care Team:  Provider, No Known as PCP - General    Chief Complaint:  Chest pain    Subjective     Subjective: Continues to have chest discomfort as she had last evening after the procedure was complete.  She also states that she has no insurance and is not sure how she is going to pay for this.  Currently 9395% saturated on 4 L    Review of Systems:   As above.    Medications:    aspirin 81 mg Oral Daily   atorvastatin 40 mg Oral Nightly   clopidogrel 75 mg Oral Daily   famotidine 20 mg Intravenous Q12H   lisinopril 2.5 mg Oral Q24H   metoprolol tartrate 12.5 mg Oral Q12H   sodium chloride 3 mL Intravenous Q12H       Objective     Vital Sign Min/Max for last 24 hours  Temp  Min: 97.6 °F (36.4 °C)  Max: 98.6 °F (37 °C)   BP  Min: 86/60  Max: 140/85   Pulse  Min: 93  Max: 123   Resp  Min: 18  Max: 42   SpO2  Min: 80 %  Max: 100 %   Flow (L/min)  Min: 4  Max: 4   Weight  Min: 81.6 kg (180 lb)  Max: 81.6 kg (180 lb)      Intake/Output Summary  "(Last 24 hours) at 12/4/2018 0748  Last data filed at 12/4/2018 0600  Gross per 24 hour   Intake 139.8 ml   Output 800 ml   Net -660.2 ml        Flowsheet Rows      First Filed Value   Admission Height  152.4 cm (60\") Documented at 12/03/2018 1800   Admission Weight  81.6 kg (180 lb) Documented at 12/03/2018 1800          Physical Exam:    General: Alert and oriented.   Cardiovascular: Heart has a nondisplaced focal PMI. Tachy regular rate and rhythm without murmur, gallop or rub.  Lungs: Bilateral rales throughout  Abdomen: Soft, nontender.  Extremities: Show no edema.  No hematoma and no bruit  Skin: warm and dry.     Results Review:    I reviewed the patient's new clinical results.  EKG:  Tele: Sinus tach at 123    Labs:    Results from last 7 days   Lab Units  12/04/18   0339  12/03/18   2128   SODIUM mmol/L  135  135  135   POTASSIUM mmol/L  4.0  4.7  4.7   CHLORIDE mmol/L  106  109  109   CO2 mmol/L  19.0*  16.0*  16.0*   BUN mg/dL  14  13  13   CREATININE mg/dL  0.80  0.81  0.81   CALCIUM mg/dL  8.8  8.7  8.7   BILIRUBIN mg/dL   --   0.3   ALK PHOS U/L   --   91   ALT (SGPT) U/L   --   76*   AST (SGOT) U/L   --   292*   GLUCOSE mg/dL  213*  244*  244*     Results from last 7 days   Lab Units  12/04/18   0339 12/03/18 2128   WBC 10*3/mm3  24.52*  28.96*   HEMOGLOBIN g/dL  13.9  14.6   HEMATOCRIT %  42.3  44.4*   PLATELETS 10*3/mm3  389  469*   MONOCYTES % %   --   4.0     Lab Results   Component Value Date    TROPONINI >250.000 (C) 12/04/2018    TROPONINI >250.000 (C) 12/03/2018     Lab Results   Component Value Date    CHOL 254 (H) 12/04/2018     Lab Results   Component Value Date    TRIG 230 (H) 12/04/2018     Lab Results   Component Value Date    HDL 30 (L) 12/04/2018     No components found for: LDLCALC  No results found for: INR, PROTIME      Ejection Fraction: 25% with anterior akinesis and apical dyskinesis    Assessment   Assessment:    25. Acute anterior myocardial infarction status post " stent placement ×2 using overlapping 3.0 x 18 mm Yvrose drug-eluting stents to the proximal and mid LAD December 3, 2018  26. Brief ventricular tachycardia reported by EMS.  She arrived on an amiodarone drip by ambulance from [facility]  27. Dyslipidemia with a low HDL of 30 and an LDL of 199  28. Leukocytosis probably related to stress  29. Tobacco abuse@2 packs per day  30. Bilateral pulmonary nodules measuring up to 6 mm in size by CT scan at OSH 12/3/18.      Plan:     assistance for financial support  Metoprolol for heart rate control  Lipitor as ordered  Lisinopril for LV dysfunction  Continue aspirin and Plavix  Diuresis  Patient may shower daily  LifeVest at time of discharge unless EF is markedly improved by echo tomorrow  Keep in ICU for 24 hours      Elen Boothe MD  12/04/18  7:48 AM            Electronically signed by Elen Boothe MD at 12/4/2018  8:06 AM     Mitchel Cruz MD at 12/3/2018  9:05 PM          INTENSIVIST / PULMONARY FOLLOW UP NOTE     Hospital:  LOS: 0 days   Ms. Jennifer Dey, 43 y.o. female is followed for:     Anterior STEMI S/P stent X2 LAD per Dr. Boothe on 12/3    Current smoker    Bilateral pulmonary nodules    Class 2 obesity in adult       Subjective   SUBJECTIVE   Jennifer Dey is a 43 y.o. female current smoker that presented to [facility] from [facility] with acute onset sharp substernal chest pain this afternoon while doing house chores. There was associated nausea and vomiting. Per the medical record, she did experience episodes of VT at the [facility]. Upon [facility] arrival Ms. Davila was noted to have ST elevation in leads V2-V4. She was taken to the cath lab and underwent PCI with stent X2 to the LAD per Dr. Boothe. EF was estimated to be ~25%. She will be brought to the ICU for higher level of care.     Upon ICU arrival Ms. Davila is noted to be hypoxic with SpO2 78% on 80% PRB. She denies chest pain or  "shortness of breath. Nausea has resolved. She was placed on Bipap with improvement of oxygenation. There are no additional complaints.     Ms. Davila denies any relevant past medical history as she states she has not been to a doctor \"in years\".     The patient's relevant past medical, surgical, family, and social history were reviewed    Allergies and medications were reviewed    ROS:  Per subjective, all other systems were reviewed and were negative     Objective   OBJECTIVE     Vital Sign Min/Max for last 24 hours:  Temp  Min: 98.6 °F (37 °C)  Max: 98.6 °F (37 °C)   BP  Min: 94/84  Max: 110/82   Pulse  Min: 95  Max: 98   Resp  Min: 27  Max: 42   SpO2  Min: 80 %  Max: 97 %   No Data Recorded     Physical Exam:  General Appearance:  Conversant, in no acute distress  Eyes:  No scleral icterus or pallor, pupils normal. Xanthelasma noted.   Ears, Nose, Mouth, Throat:  Atraumatic, oropharynx clear  Neck:  Trachea midline, thyroid normal. Negative JVD.   Respiratory:  Course expiratory wheezes and rhonchi. Normal effort, no tenderness to palpation  Cardiovascular:  Regular rate and rhythm, no murmurs, no peripheral edema, no thrill  Gastrointestinal:  Obese, soft, non-tender, non-distended, no hepatosplenomegaly  Skin:  Right groin arterial/vemous sheath without evidence of hematoma. Normal temperature, no rash  Psychiatric:  Alert and oriented x 3, normal judgement and insight  Neuro:  No new focal neurologic deficits observed    Telemetry:   NSR-ST           Hemodynamics:   CVP:     PAP:     PAOP:     CO:     CI:     SVI:     SVR:       SpO2: 97 % SpO2  Min: 80 %  Max: 97 %   Device:      Flow Rate:   No Data Recorded     Mechanical Ventilator Settings:            Resp Rate (Set): 10 Resp Rate (Observed) Vent: 25   FiO2 (%): 100 %           Minute Ventilation (L/min) (Obs): 10.2 L/min          Intake/Ouptut 24 hrs (7:00AM - 6:59 AM)  Intake & Output (last 3 days)       12/01 0701 - 12/02 0700 12/02 0701 - 12/03 0700 " 12/03 0701 - 12/04 0700           Urine Unmeasured Occurrence   1 x          Lines, Drains & Airways    Active LDAs     Name:   Placement date:   Placement time:   Site:   Days:    Peripheral IV 12/03/18 Left Antecubital   12/03/18    --    Antecubital   less than 1    Arterial Sheath 6 Fr. Right Femoral   --    --    Femoral       Venous Sheath 6 Fr. Right Femoral   --    --    Femoral                 Hematology:        Invalid input(s): NEUTOPHILPCT,  EOSPCT  Electrolytes, Magnesium and Phosphorus:      Renal:      CrCl cannot be calculated (No order found.).  Hepatic:      Arterial Blood Gases:  Results from last 7 days   Lab Units  12/03/18 2044   PH, ARTERIAL pH units  7.283*   PCO2, ARTERIAL mm Hg  36.0   PO2 ART mm Hg  140.0*   FIO2 %  100             No results found for: LACTATE    Relevant imaging studies and labs from 12/03/18 were reviewed and interpreted by me    Medications (drips):         [START ON 12/4/2018] aspirin 81 mg Oral Daily   atorvastatin 40 mg Oral Nightly   [START ON 12/4/2018] clopidogrel 75 mg Oral Daily   famotidine 20 mg Intravenous Q12H   [START ON 12/4/2018] lisinopril 2.5 mg Oral Q24H   metoprolol tartrate 12.5 mg Oral Q12H   sodium chloride 3 mL Intravenous Q12H       Assessment/Plan   IMPRESSION / PLAN     Inpatient Problem List:  43 y.o.female:  Active Hospital Problems    Diagnosis   • **Anterior STEMI S/P stent X2 LAD per Dr. Boothe on 12/3   • Current smoker   • Bilateral pulmonary nodules     Bilateral pulmonary nodules measuring up to 6 mm in size per CT at [facility] on 12/3      • Class 2 obesity in adult        Impression:  43 y.o.female with relevant PMH of Tobacco Abuse admitted 12/3/2018 w/ STEMI s/p 2 stents to LAD,ICM w/ EF 25% and acute systolic HF.    Plan:  1. Follow in ICU  2. Medications per cardiology   3. Bipap with ABG; Keep SPO2 >90%, may need diuretics +/- inotropes  4. AM labs  5. Troponin now and in AM  6. EKG now and in AM  7. Accuchecks every 6 hours    8. Mechanical VTE and PUD PPX  9. Bronchodilators  10. Nicotine replacement     Nutrition - NPO Diet    Plan of care and goals reviewed with mulitdisciplinary team at daily rounds    Critical Care time spent in direct patient care: 30 minutes (excluding procedure time, if applicable) including high complexity decision making to assess, manipulate, and support vital organ system failure in this individual who has impairment of one or more vital organ systems such that there is a high probability of imminent or life threatening deterioration in the patient’s condition.     PATRICIA Gonzalez, [facility]   Pulmonary and Critical Care     Mitchel Cruz MD  Intensive Care Medicine  12/03/18 9:18 PM       Electronically signed by Mitchel Cruz MD at 12/3/2018  9:51 PM       Discharge Summary     No notes of this type exist for this encounter.

## 2018-12-11 NOTE — OUTREACH NOTE
KIRSTEN call not completed.  Patient is scheduled as a new patient today.  I have not been able to reach patient to confirm and the nurse call center has not been able to reach patient by phone.  KIRSTEN/TCM applicable until 12/21/18.

## 2018-12-11 NOTE — PATIENT INSTRUCTIONS
Heart Attack  A heart attack (myocardial infarction, MI) causes damage to the heart that cannot be fixed. A heart attack often happens when a blood clot or other blockage cuts blood flow to the heart. When this happens, certain areas of the heart begin to die. This causes the pain you feel during a heart attack.  Follow these instructions at home:  · Take medicine as told by your doctor. You may need medicine to:  ? Keep your blood from clotting too easily.  ? Control your blood pressure.  ? Lower your cholesterol.  ? Control abnormal heart rhythms.  · Change certain behaviors as told by your doctor. This may include:  ? Quitting smoking.  ? Being active.  ? Eating a heart-healthy diet. Ask your doctor for help with this diet.  ? Keeping a healthy weight.  ? Keeping your diabetes under control.  ? Lessening stress.  ? Limiting how much alcohol you drink.  Do not take these medicines unless your doctor says that you can:  · Nonsteroidal anti-inflammatory drugs (NSAIDs). These include:  ? Ibuprofen.  ? Naproxen.  ? Celecoxib.  · Vitamin supplements that have vitamin A, vitamin E, or both.  · Hormone therapy that contains estrogen with or without progestin.    Get help right away if:  · You have sudden chest discomfort.  · You have sudden discomfort in your:  ? Arms.  ? Back.  ? Neck.  ? Jaw.  · You have shortness of breath at any time.  · You have sudden sweating or clammy skin.  · You feel sick to your stomach (nauseous) or throw up (vomit).  · You suddenly get light-headed or dizzy.  · You feel your heart beating fast or skipping beats.  These symptoms may be an emergency. Do not wait to see if the symptoms will go away. Get medical help right away. Call your local emergency services (911 in the [place] Do not drive yourself to the hospital.  This information is not intended to replace advice given to you by your health care provider. Make sure you discuss any questions you have with your health care  "provider.  Document Released: 06/18/2013 Document Revised: 05/25/2017 Document Reviewed: 02/20/2015  [employer] Interactive Patient Education © 2017 [employer]        Heart-Healthy Eating Plan  Heart-healthy meal planning includes:  · Limiting unhealthy fats.  · Increasing healthy fats.  · Making other small dietary changes.    You may need to talk with your doctor or a diet specialist (dietitian) to create an eating plan that is right for you.  What types of fat should I choose?  · Choose healthy fats. These include olive oil and canola oil, flaxseeds, walnuts, almonds, and seeds.  · Eat more omega-3 fats. These include salmon, mackerel, sardines, tuna, flaxseed oil, and ground flaxseeds. Try to eat fish at least twice each week.  · Limit saturated fats.  ? Saturated fats are often found in animal products, such as meats, butter, and cream.  ? Plant sources of saturated fats include palm oil, palm kernel oil, and coconut oil.  · Avoid foods with partially hydrogenated oils in them. These include stick margarine, some tub margarines, cookies, crackers, and other baked goods. These contain trans fats.  What general guidelines do I need to follow?  · Check food labels carefully. Identify foods with trans fats or high amounts of saturated fat.  · Fill one half of your plate with vegetables and green salads. Eat 4-5 servings of vegetables per day. A serving of vegetables is:  ? 1 cup of raw leafy vegetables.  ? ½ cup of raw or cooked cut-up vegetables.  ? ½ cup of vegetable juice.  · Fill one fourth of your plate with whole grains. Look for the word \"whole\" as the first word in the ingredient list.  · Fill one fourth of your plate with lean protein foods.  · Eat 4-5 servings of fruit per day. A serving of fruit is:  ? One medium whole fruit.  ? ¼ cup of dried fruit.  ? ½ cup of fresh, frozen, or canned fruit.  ? ½ cup of 100% fruit juice.  · Eat more foods that contain soluble fiber. These include apples, broccoli, " carrots, beans, peas, and barley. Try to get 20-30 g of fiber per day.  · Eat more home-cooked food. Eat less restaurant, buffet, and fast food.  · Limit or avoid alcohol.  · Limit foods high in starch and sugar.  · Avoid fried foods.  · Avoid frying your food. Try baking, boiling, grilling, or broiling it instead. You can also reduce fat by:  ? Removing the skin from poultry.  ? Removing all visible fats from meats.  ? Skimming the fat off of stews, soups, and gravies before serving them.  ? Steaming vegetables in water or broth.  · Lose weight if you are overweight.  · Eat 4-5 servings of nuts, legumes, and seeds per week:  ? One serving of dried beans or legumes equals ½ cup after being cooked.  ? One serving of nuts equals 1½ ounces.  ? One serving of seeds equals ½ ounce or one tablespoon.  · You may need to keep track of how much salt or sodium you eat. This is especially true if you have high blood pressure. Talk with your doctor or dietitian to get more information.  What foods can I eat?  Grains  Breads, including Nepali, white, sajan, wheat, raisin, rye, oatmeal, and Italian. Tortillas that are neither fried nor made with lard or trans fat. Low-fat rolls, including hotdog and hamburger buns and English muffins. Biscuits. Muffins. Waffles. Pancakes. Light popcorn. Whole-grain cereals. Flatbread. Mona toast. Pretzels. Breadsticks. Rusks. Low-fat snacks. Low-fat crackers, including oyster, saltine, matzo, yung, animal, and rye. Rice and pasta, including brown rice and pastas that are made with whole wheat.  Vegetables  All vegetables.  Fruits  All fruits, but limit coconut.  Meats and Other Protein Sources  Lean, well-trimmed beef, veal, pork, and lamb. Chicken and turkey without skin. All fish and shellfish. Wild duck, rabbit, pheasant, and venison. Egg whites or low-cholesterol egg substitutes. Dried beans, peas, lentils, and tofu. Seeds and most nuts.  Dairy  Low-fat or nonfat cheeses, including ricotta,  string, and mozzarella. Skim or 1% milk that is liquid, powdered, or evaporated. Buttermilk that is made with low-fat milk. Nonfat or low-fat yogurt.  Beverages  Mineral water. Diet carbonated beverages.  Sweets and Desserts  Sherbets and fruit ices. Honey, jam, marmalade, jelly, and syrups. Meringues and gelatins. Pure sugar candy, such as hard candy, jelly beans, gumdrops, mints, marshmallows, and small amounts of dark chocolate. Andrés food cake.  Eat all sweets and desserts in moderation.  Fats and Oils  Nonhydrogenated (trans-free) margarines. Vegetable oils, including soybean, sesame, sunflower, olive, peanut, safflower, corn, canola, and cottonseed. Salad dressings or mayonnaise made with a vegetable oil. Limit added fats and oils that you use for cooking, baking, salads, and as spreads.  Other  Cocoa powder. Coffee and tea. All seasonings and condiments.  The items listed above may not be a complete list of recommended foods or beverages. Contact your dietitian for more options.  What foods are not recommended?  Grains  Breads that are made with saturated or trans fats, oils, or whole milk. Croissants. Butter rolls. Cheese breads. Sweet rolls. Donuts. Buttered popcorn. Chow mein noodles. High-fat crackers, such as cheese or butter crackers.  Meats and Other Protein Sources  Fatty meats, such as hotdogs, short ribs, sausage, spareribs, kimble, rib eye roast or steak, and mutton. High-fat deli meats, such as salami and bologna. Caviar. Domestic duck and goose. Organ meats, such as kidney, liver, sweetbreads, and heart.  Dairy  Cream, sour cream, cream cheese, and creamed cottage cheese. Whole-milk cheeses, including blue (adi), Nutley Anjel, Brie, Fox, American, Havarti, Swiss, cheddar, Camembert, and [place]. Whole or 2% milk that is liquid, evaporated, or condensed. Whole buttermilk. Cream sauce or high-fat cheese sauce. Yogurt that is made from whole milk.  Beverages  Regular sodas and juice drinks with  added sugar.  Sweets and Desserts  Frosting. Pudding. Cookies. Cakes other than toño food cake. Candy that has milk chocolate or white chocolate, hydrogenated fat, butter, coconut, or unknown ingredients. Buttered syrups. Full-fat ice cream or ice cream drinks.  Fats and Oils  Gravy that has suet, meat fat, or shortening. Cocoa butter, hydrogenated oils, palm oil, coconut oil, palm kernel oil. These can often be found in baked products, candy, fried foods, nondairy creamers, and whipped toppings. Solid fats and shortenings, including kimble fat, salt pork, lard, and butter. Nondairy cream substitutes, such as coffee creamers and sour cream substitutes. Salad dressings that are made of unknown oils, cheese, or sour cream.  The items listed above may not be a complete list of foods and beverages to avoid. Contact your dietitian for more information.  This information is not intended to replace advice given to you by your health care provider. Make sure you discuss any questions you have with your health care provider.  Document Released: 06/18/2013 Document Revised: 05/25/2017 Document Reviewed: 06/11/2015  [email or web address] Interactive Patient Education © 2018 [employer]

## 2018-12-11 NOTE — PROGRESS NOTES
Chief Complaint   Patient presents with   • [facility]     hospital follow up; MI       Subjective     History of Present Illness   Jennifer Dey is a 43 y.o. female presenting for follow up after hospitalization.  Hospital records were reviewed and discussed with the patient.    Hospitalization Summary:  Patient was admitted on 12/3/18 and discharged on 12/7/18 at [facility] for acute chest pain and found to be anterior STEMI which required stenting of the LAD.  Echocardiogram was performed and presented ejection fraction of 20-25%.  A1c was 6.9.  Patient was started on appropriate cardiac medications.  Since discharge, she has been feeling well.  She states that her swelling and shortness of breath are significantly improved.    Patient's daughter is in the room and is assisting with ensuring medication compliance as well as following through with dietary changes.  Patient has now stopped drinking Mountain Dew's and stop smoking.  She was smoking 2 packs per day over the last 30 years.  She does feel that she is feeling anxious and has a sense of doom frequently since he was discharged from the hospital.  She would like some medication to help with anxiety.      The following portions of the patient's history were reviewed and updated as appropriate: allergies, current medications, past family history, past medical history, past social history, past surgical history and problem list.    Review of Systems   Constitutional: Positive for fatigue. Negative for chills and fever.   HENT: Negative for congestion, ear pain, rhinorrhea, sinus pressure and sore throat.    Eyes: Negative for visual disturbance.   Respiratory: Negative for cough, chest tightness, shortness of breath and wheezing.    Cardiovascular: Negative for chest pain, palpitations and leg swelling.   Gastrointestinal: Negative for abdominal pain, blood in stool, constipation, diarrhea, nausea and vomiting.   Endocrine: Negative for  polydipsia and polyuria.   Genitourinary: Negative for dysuria and hematuria.   Musculoskeletal: Negative for back pain.   Skin: Negative for rash.   Neurological: Negative for dizziness, light-headedness, numbness and headaches.   Psychiatric/Behavioral: Negative for dysphoric mood and sleep disturbance. The patient is nervous/anxious.        No Known Allergies    Past Medical History:   Diagnosis Date   • CAD (coronary artery disease)    • Current smoker 12/3/2018   • Low back pain 12/3/2018       Social History     Socioeconomic History   • Marital status:      Spouse name: Not on file   • Number of children: Not on file   • Years of education: Not on file   • Highest education level: Not on file   Social Needs   • Financial resource strain: Not on file   • Food insecurity - worry: Not on file   • Food insecurity - inability: Not on file   • Transportation needs - medical: Not on file   • Transportation needs - non-medical: Not on file   Occupational History   • Not on file   Tobacco Use   • Smoking status: Current Every Day Smoker     Packs/day: 2.00   • Smokeless tobacco: Never Used   Substance and Sexual Activity   • Alcohol use: Not on file   • Drug use: No   • Sexual activity: Defer   Other Topics Concern   • Not on file   Social History Narrative   • Not on file       No past surgical history on file.    Family History   Problem Relation Age of Onset   • Heart disease Mother    • Heart disease Father          Current Outpatient Medications:   •  acetaminophen (TYLENOL) 500 MG tablet, Take 1,000 mg by mouth Every 6 (Six) Hours As Needed for Mild Pain ., Disp: , Rfl:   •  aspirin 81 MG chewable tablet, Chew 1 tablet Daily., Disp: 30 tablet, Rfl: 11  •  atorvastatin (LIPITOR) 40 MG tablet, Take 1 tablet by mouth Every Night., Disp: 30 tablet, Rfl: 11  •  clopidogrel (PLAVIX) 75 MG tablet, Take 1 tablet by mouth Daily., Disp: 30 tablet, Rfl: 11  •  furosemide (LASIX) 40 MG tablet, Take 0.5 tablets by  "mouth Daily., Disp: 30 tablet, Rfl: 11  •  metoprolol tartrate (LOPRESSOR) 25 MG tablet, Take 0.5 tablets by mouth Every 12 (Twelve) Hours., Disp: 60 tablet, Rfl: 11  •  nitroglycerin (NITROSTAT) 0.4 MG SL tablet, Place 1 tablet under the tongue Every 5 (Five) Minutes As Needed for Chest Pain. Take no more than 3 doses in 15 minutes., Disp: 25 tablet, Rfl: 12  •  spironolactone (ALDACTONE) 25 MG tablet, Take 1 tablet by mouth Daily., Disp: 30 tablet, Rfl: 11  •  hydrOXYzine (ATARAX) 25 MG tablet, Take 1 tablet by mouth 3 (Three) Times a Day As Needed for Anxiety., Disp: 45 tablet, Rfl: 2  •  nicotine (NICODERM CQ) 21 MG/24HR patch, Place 1 patch on the skin as directed by provider Daily., Disp: 28 each, Rfl: 0  •  sertraline (ZOLOFT) 25 MG tablet, Take 1 tablet by mouth Daily., Disp: 30 tablet, Rfl: 2    Objective   /72 (BP Location: Left arm, Patient Position: Sitting)   Pulse 99   Temp 98.4 °F (36.9 °C)   Ht 152.4 cm (60\")   Wt 85.3 kg (188 lb)   SpO2 100%   BMI 36.72 kg/m²     Physical Exam   Constitutional: She is oriented to person, place, and time. She appears well-developed and well-nourished.   HENT:   Head: Normocephalic and atraumatic.   Right Ear: External ear normal.   Left Ear: External ear normal.   Eyes: Conjunctivae are normal.   Neck: Normal range of motion. Neck supple. No thyromegaly present.   Cardiovascular: Normal rate, regular rhythm and normal heart sounds.   Pulmonary/Chest: Effort normal and breath sounds normal. No respiratory distress.   Abdominal: Soft. Bowel sounds are normal. She exhibits no distension. There is no tenderness.   Musculoskeletal: Normal range of motion.    Jennifer had a diabetic foot exam performed today.   During the foot exam she had a monofilament test performed.  Vascular Status -  Her right foot exhibits normal foot vasculature . Her left foot exhibits normal foot vasculature .  Skin Integrity  -  Her right foot skin is intact.Her left foot skin is " intact..  Neurological: She is alert and oriented to person, place, and time.   Psychiatric: She has a normal mood and affect. Her behavior is normal.   Nursing note and vitals reviewed.      Assessment/Plan   Jennifer was seen today for [facility] [place] care.    Diagnoses and all orders for this visit:    Anterior STEMI S/P stent X2 LAD per Dr. Boothe on 12/3    Tobacco abuse  -     nicotine (NICODERM CQ) 21 MG/24HR patch; Place 1 patch on the skin as directed by provider Daily.    Anxiety  -     sertraline (ZOLOFT) 25 MG tablet; Take 1 tablet by mouth Daily.  -     hydrOXYzine (ATARAX) 25 MG tablet; Take 1 tablet by mouth 3 (Three) Times a Day As Needed for Anxiety.    Class 2 obesity due to excess calories without serious comorbidity with body mass index (BMI) of 36.0 to 36.9 in adult    STEMI involving left anterior descending coronary artery (CMS/HCC)    Current smoker    Other orders  -     furosemide (LASIX) 40 MG tablet; Take 0.5 tablets by mouth Daily.          Discussion Summary:    43-year-old white female presenting to establish care following STEMI and hospitalization at [facility].    1. CAD s/p Stent x2 to LAD / Compensated Systolic Heart Failure   - Continue current cardiac medications.  -Lasix dose was reduced to 20 mg daily.  Swelling appears to be improved.  Excess diuresis may lead to dehydration.  -Continue follow-up with cardiology later this week.    2.  Tobacco abuse  - 5 minutes were spent with tobacco cessation counselling. Treatment options were discussed including nicotine replacement therapy, Wellbutrin, and Chantix. The patient aims to quit tobacco use on 12/7/18 with nicotine patch.  Patient has already quit but needs assistance with staying off of cigarettes.    3.  Anxiety/depression  -Start Zoloft 25 mg daily.  This will likely need to be titrated up at next visit.  -Hydroxyzine to be used for panic attacks    4.  Class II obesity  - Weight loss options were discussed in detail including  reducing fried, fatty, and sugary foods with diet control. A regular exercise regimen was discussed.  Daughter and patient may consider Mediterranean diet, low-sodium diet and avoidance of processed foods.        Transitional Care Management Certification  I certify that the following are true:  1. Communication was made within 2 business days of discharge.  2. Complexity of Medical Decision Making is high.  3. Face to face visit occurred within 3 days.    *Note: 08224 is for high complexity patients with a face to face visit within 7 days of discharge.  54150 is for high complexity patients with a face to face on days 8-14 post discharge or medium complexity with face to face visit within 14 days post discharge.    Follow up:  Return in about 2 months (around 2/11/2019) for Annual physical.     Patient Instructions:  Patient instructions were provided.

## 2018-12-13 ENCOUNTER — OFFICE VISIT (OUTPATIENT)
Dept: CARDIOLOGY | Facility: HOSPITAL | Age: 43
End: 2018-12-13

## 2018-12-13 ENCOUNTER — HOSPITAL ENCOUNTER (OUTPATIENT)
Dept: CARDIOLOGY | Facility: HOSPITAL | Age: 43
Discharge: HOME | End: 2018-12-13
Admitting: NURSE PRACTITIONER

## 2018-12-13 ENCOUNTER — TELEPHONE (OUTPATIENT)
Dept: CARDIOLOGY | Facility: HOSPITAL | Age: 43
End: 2018-12-13

## 2018-12-13 ENCOUNTER — APPOINTMENT (OUTPATIENT)
Dept: LAB | Facility: HOSPITAL | Age: 43
End: 2018-12-13

## 2018-12-13 VITALS
DIASTOLIC BLOOD PRESSURE: 76 MMHG | HEIGHT: 61 IN | SYSTOLIC BLOOD PRESSURE: 113 MMHG | RESPIRATION RATE: 16 BRPM | OXYGEN SATURATION: 99 % | TEMPERATURE: 99.1 F | WEIGHT: 184 LBS | BODY MASS INDEX: 34.74 KG/M2 | HEART RATE: 90 BPM

## 2018-12-13 DIAGNOSIS — I50.21 ACUTE SYSTOLIC HEART FAILURE (HCC): Primary | ICD-10-CM

## 2018-12-13 DIAGNOSIS — R94.31 PROLONGED Q-T INTERVAL ON ECG: ICD-10-CM

## 2018-12-13 DIAGNOSIS — I21.02 ACUTE ST ELEVATION MYOCARDIAL INFARCTION (STEMI) INVOLVING LEFT ANTERIOR DESCENDING (LAD) CORONARY ARTERY (HCC): ICD-10-CM

## 2018-12-13 DIAGNOSIS — E78.5 HYPERLIPIDEMIA LDL GOAL <70: ICD-10-CM

## 2018-12-13 DIAGNOSIS — E66.09 CLASS 2 OBESITY DUE TO EXCESS CALORIES WITHOUT SERIOUS COMORBIDITY WITH BODY MASS INDEX (BMI) OF 36.0 TO 36.9 IN ADULT: ICD-10-CM

## 2018-12-13 LAB
ANION GAP SERPL CALCULATED.3IONS-SCNC: 10 MMOL/L (ref 3–11)
BUN BLD-MCNC: 11 MG/DL (ref 9–23)
BUN/CREAT SERPL: 13.4 (ref 7–25)
CALCIUM SPEC-SCNC: 9.5 MG/DL (ref 8.7–10.4)
CHLORIDE SERPL-SCNC: 103 MMOL/L (ref 99–109)
CO2 SERPL-SCNC: 25 MMOL/L (ref 20–31)
CREAT BLD-MCNC: 0.82 MG/DL (ref 0.6–1.3)
GFR SERPL CREATININE-BSD FRML MDRD: 75 ML/MIN/1.73
GLUCOSE BLD-MCNC: 118 MG/DL (ref 70–100)
POTASSIUM BLD-SCNC: 4.2 MMOL/L (ref 3.5–5.5)
SODIUM BLD-SCNC: 138 MMOL/L (ref 132–146)

## 2018-12-13 PROCEDURE — 80048 BASIC METABOLIC PNL TOTAL CA: CPT | Performed by: NURSE PRACTITIONER

## 2018-12-13 PROCEDURE — 93010 ELECTROCARDIOGRAM REPORT: CPT | Performed by: INTERNAL MEDICINE

## 2018-12-13 PROCEDURE — 93005 ELECTROCARDIOGRAM TRACING: CPT | Performed by: NURSE PRACTITIONER

## 2018-12-13 PROCEDURE — 99213 OFFICE O/P EST LOW 20 MIN: CPT | Performed by: NURSE PRACTITIONER

## 2018-12-13 PROCEDURE — 36415 COLL VENOUS BLD VENIPUNCTURE: CPT | Performed by: NURSE PRACTITIONER

## 2018-12-13 RX ORDER — LOSARTAN POTASSIUM 25 MG/1
25 TABLET ORAL DAILY
Qty: 30 TABLET | Refills: 1 | Status: SHIPPED | OUTPATIENT
Start: 2018-12-13 | End: 2019-01-23

## 2018-12-13 NOTE — PATIENT INSTRUCTIONS
First week your are on Losartan 25 mg tab, only take a half.  If your blood pressure stays in good range, you can increase it to a whole pill until your appointment with Dr. Boothe in [place].

## 2018-12-13 NOTE — PROGRESS NOTES
"  Subjective:     Encounter Date:2018      Patient ID: Jennifer Dey is a 43 y.o. female.    Chief Complaint: one week HF follow up after hospital discharge    History of Present Illness:  Ms. Dey suffered anterior STEMI on 12/3/18.  She was transferred from [facility].  She was emergently taken to the cath lab per Dr. Boothe.  PTCA/ stent placement was performed in proximal and mid LAD.  Severe left ventricular systolic dysfunction was noted @ 25%.  She is compliant with her LifeVest.  She presents today for HF follow up.    Patient is accompanied by daughter.  She is happy to report no cigarette use, drinking only 1/2 of one Mt Dew soda per day, and no blood sugars >165.  Her Lifevest report shows excellent compliance.  Patient's only complaint is sleeping poorly.  She states \"I worry about not waking up\".      PCP reduced lasix to 20 mg daily earlier this week.      Past Medical History:   Diagnosis Date   • CAD (coronary artery disease)    • Current smoker 12/3/2018   • Low back pain 12/3/2018       Past Surgical History:   Procedure Laterality Date   • TUBAL ABDOMINAL LIGATION     • WRIST FRACTURE SURGERY         Social History     Socioeconomic History   • Marital status: Legally      Spouse name: Not on file   • Number of children: 2   • Years of education: Not on file   • Highest education level: Not on file   Social Needs   • Financial resource strain: Somewhat hard   • Food insecurity - worry: Never true   • Food insecurity - inability: Never true   • Transportation needs - medical: No   • Transportation needs - non-medical: No   Occupational History   • Occupation: babysitting   Tobacco Use   • Smoking status: Former Smoker     Packs/day: 2.00     Years: 30.00     Pack years: 60.00     Last attempt to quit: 12/3/2018     Years since quittin.0   • Smokeless tobacco: Never Used   Substance and Sexual Activity   • Alcohol use: No     Frequency: Never   • " Drug use: No   • Sexual activity: Defer   Other Topics Concern   • Not on file   Social History Narrative    Caffeine Intake :0-1  servings per day    Patient lives at home with her daughter        Current Outpatient Medications:   •  acetaminophen (TYLENOL) 500 MG tablet, Take 1,000 mg by mouth Every 6 (Six) Hours As Needed for Mild Pain ., Disp: , Rfl:   •  aspirin 81 MG chewable tablet, Chew 1 tablet Daily., Disp: 30 tablet, Rfl: 11  •  atorvastatin (LIPITOR) 40 MG tablet, Take 1 tablet by mouth Every Night., Disp: 30 tablet, Rfl: 11  •  clopidogrel (PLAVIX) 75 MG tablet, Take 1 tablet by mouth Daily., Disp: 30 tablet, Rfl: 11  •  furosemide (LASIX) 40 MG tablet, Take 0.5 tablets by mouth Daily., Disp: 30 tablet, Rfl: 11  •  hydrOXYzine (ATARAX) 25 MG tablet, Take 1 tablet by mouth 3 (Three) Times a Day As Needed for Anxiety., Disp: 45 tablet, Rfl: 2  •  metoprolol tartrate (LOPRESSOR) 25 MG tablet, Take 0.5 tablets by mouth Every 12 (Twelve) Hours., Disp: 60 tablet, Rfl: 11  •  nicotine (NICODERM CQ) 21 MG/24HR patch, Place 1 patch on the skin as directed by provider Daily., Disp: 28 each, Rfl: 0  •  nitroglycerin (NITROSTAT) 0.4 MG SL tablet, Place 1 tablet under the tongue Every 5 (Five) Minutes As Needed for Chest Pain. Take no more than 3 doses in 15 minutes., Disp: 25 tablet, Rfl: 12  •  sertraline (ZOLOFT) 25 MG tablet, Take 1 tablet by mouth Daily., Disp: 30 tablet, Rfl: 2  •  spironolactone (ALDACTONE) 25 MG tablet, Take 1 tablet by mouth Daily., Disp: 30 tablet, Rfl: 11     Review of Systems   Constitution: Positive for weakness and weight loss.   Cardiovascular: Positive for chest pain and dyspnea on exertion.   Respiratory: Positive for shortness of breath.    Endocrine:        Excess urination, excess thirst   Gastrointestinal: Positive for diarrhea, heartburn, nausea and vomiting.   Genitourinary: Positive for frequency.   Neurological: Positive for dizziness and headaches.   Psychiatric/Behavioral:  "The patient has insomnia and is nervous/anxious.      Vitals:    12/13/18 1051 12/13/18 1053 12/13/18 1054   BP: 110/79 118/78 113/76   BP Location: Left arm Right arm Right arm   Patient Position: Sitting Sitting Standing   Cuff Size: Adult     Pulse: 89 88 90   Resp: 16     Temp: 99.1 °F (37.3 °C)     TempSrc: Temporal     SpO2: 99%     Weight: 83.5 kg (184 lb)     Height: 154.9 cm (61\")         Objective:     Physical Exam   Constitutional: She is oriented to person, place, and time. She appears well-developed. No distress.   Obese young adult female in NAD.  Accompanied by daughter   KINJAL:   Head: Normocephalic and atraumatic.   Eyes: Conjunctivae are normal. Pupils are equal, round, and reactive to light. No scleral icterus.   Neck: Normal range of motion. Neck supple. No JVD present.   Cardiovascular: Normal rate, regular rhythm, normal heart sounds and intact distal pulses. Exam reveals no gallop and no friction rub.   No murmur heard.  Pulmonary/Chest: Effort normal and breath sounds normal. No respiratory distress. She has no wheezes. She has no rales. She exhibits no tenderness.   Lifevest in place. No rales or rhonchi   Abdominal: Soft. Bowel sounds are normal.   Musculoskeletal: Normal range of motion. She exhibits no edema.   Neurological: She is alert and oriented to person, place, and time. No cranial nerve deficit.   Skin: Skin is warm and dry. No rash noted. No erythema.   Psychiatric: She has a normal mood and affect. Her behavior is normal. Judgment and thought content normal.   Vitals reviewed.      Lab Review: DC notes, inpatient labs, cardiac studies     Assessment/ Plan:          Diagnosis Plan   1. Acute systolic heart failure (CMS/HCC)  - Euvolemic  - BP improved since hospital DC  - Should be able to tolerate low dose ARB.  Rx for losartan 25 mg daily.  Instructed to take 1/2 tab daily for the first week then if SBP >95 OK to increase to whole tab daily.    - BMP today.  Will call results " to patient  - Reviewed HF educational booklet with 20 minutes of education time.  - Patient will follow with Dr. Boothe @ [facility] but instructed on s/sx for which she may call and come in to HF clinic if needed.  - Daughter indicates she will obtain BP cuff patient can use at home.  She understands to weigh herself daily  - Continue Life Vest  - She has questions on exercise.  Advised to walk starting w/ 5 minutes QID and gradually increase.  - Follow up with Dr. Boothe on 1/17   2. Anterior STEMI S/P stent X2 LAD per Dr. Boothe on 12/3  - ASA, plavix, BB  - Compliant with meds and smoking cessation  - No c/o anginal symptoms   3. Hyperlipidemia LDL goal <70  - Atorvastatin   4. Class 2 obesity due to excess calories without serious comorbidity with body mass index (BMI) of 36.0 to 36.9 in adult  - Good compliance with reducing/ nearly eliminating sodas thus far.    - Add walking   5. Prolonged Q-T interval on ECG  - Resolved as compared to inpatient EKG    Due to transportation issues, patient will follow @ [facility] with instructions to call [facility]

## 2018-12-13 NOTE — TELEPHONE ENCOUNTER
Spoke with Margoth to say that labs look great!  Start the new medication and check again with Dr. Boothe.  Call us with any questions or problems.

## 2018-12-17 ENCOUNTER — READMISSION MANAGEMENT (OUTPATIENT)
Dept: CALL CENTER | Facility: HOSPITAL | Age: 43
End: 2018-12-17

## 2018-12-17 NOTE — OUTREACH NOTE
AMI Week 2 Survey      Responses   Facility patient discharged from?  [facility]   Does the patient have one of the following disease processes/diagnoses(primary or secondary)?  Acute MI (STEMI,NSTEMI)   Week 2 attempt successful?  No   Unsuccessful attempts  Attempt 1          Garry Duffy RN

## 2018-12-18 ENCOUNTER — READMISSION MANAGEMENT (OUTPATIENT)
Dept: CALL CENTER | Facility: HOSPITAL | Age: 43
End: 2018-12-18

## 2018-12-18 NOTE — OUTREACH NOTE
AMI Week 2 Survey      Responses   Facility patient discharged from?  [facility]   Does the patient have one of the following disease processes/diagnoses(primary or secondary)?  Acute MI (STEMI,NSTEMI)   Week 2 attempt successful?  No   Unsuccessful attempts  Attempt 2          Milena Abreu RN

## 2018-12-21 PROBLEM — J96.01 ACUTE RESPIRATORY FAILURE WITH HYPOXIA: Status: ACTIVE | Noted: 2018-12-03

## 2018-12-21 PROBLEM — N39.3 STRESS INCONTINENCE: Status: ACTIVE | Noted: 2018-12-03

## 2019-01-07 ENCOUNTER — APPOINTMENT (OUTPATIENT)
Dept: GENERAL RADIOLOGY | Facility: HOSPITAL | Age: 44
End: 2019-01-07
Payer: COMMERCIAL

## 2019-01-07 ENCOUNTER — HOSPITAL ENCOUNTER (EMERGENCY)
Facility: HOSPITAL | Age: 44
Discharge: HOME | End: 2019-01-07
Attending: HOSPITALIST
Payer: COMMERCIAL

## 2019-01-07 VITALS
BODY MASS INDEX: 33.49 KG/M2 | WEIGHT: 182 LBS | HEART RATE: 82 BPM | RESPIRATION RATE: 16 BRPM | SYSTOLIC BLOOD PRESSURE: 96 MMHG | OXYGEN SATURATION: 96 % | DIASTOLIC BLOOD PRESSURE: 64 MMHG | HEIGHT: 62 IN

## 2019-01-07 DIAGNOSIS — R07.9 CHEST PAIN, UNSPECIFIED TYPE: Primary | ICD-10-CM

## 2019-01-07 LAB
A/G RATIO: 1.3 (ref 0.8–2)
ALBUMIN SERPL-MCNC: 4.5 G/DL (ref 3.4–4.8)
ALP BLD-CCNC: 125 U/L (ref 25–100)
ALT SERPL-CCNC: 17 U/L (ref 4–36)
ANION GAP SERPL CALCULATED.3IONS-SCNC: 14 MMOL/L (ref 3–16)
AST SERPL-CCNC: 20 U/L (ref 8–33)
BASOPHILS ABSOLUTE: 0.1 K/UL (ref 0–0.1)
BASOPHILS RELATIVE PERCENT: 0.6 %
BILIRUB SERPL-MCNC: <0.2 MG/DL (ref 0.3–1.2)
BUN BLDV-MCNC: 12 MG/DL (ref 6–20)
CALCIUM SERPL-MCNC: 9.3 MG/DL (ref 8.5–10.5)
CHLORIDE BLD-SCNC: 100 MMOL/L (ref 98–107)
CO2: 23 MMOL/L (ref 20–30)
CREAT SERPL-MCNC: 0.9 MG/DL (ref 0.4–1.2)
EOSINOPHILS ABSOLUTE: 0.2 K/UL (ref 0–0.4)
EOSINOPHILS RELATIVE PERCENT: 1.7 %
GFR AFRICAN AMERICAN: >59
GFR NON-AFRICAN AMERICAN: >60
GLOBULIN: 3.4 G/DL
GLUCOSE BLD-MCNC: 115 MG/DL (ref 74–106)
HCT VFR BLD CALC: 40.7 % (ref 37–47)
HEMOGLOBIN: 13 G/DL (ref 11.5–16.5)
IMMATURE GRANULOCYTES #: 0.1 K/UL
IMMATURE GRANULOCYTES %: 0.4 % (ref 0–5)
LYMPHOCYTES ABSOLUTE: 2.9 K/UL (ref 1.5–4)
LYMPHOCYTES RELATIVE PERCENT: 22.7 %
MCH RBC QN AUTO: 29.7 PG (ref 27–32)
MCHC RBC AUTO-ENTMCNC: 31.9 G/DL (ref 31–35)
MCV RBC AUTO: 93.1 FL (ref 80–100)
MONOCYTES ABSOLUTE: 0.7 K/UL (ref 0.2–0.8)
MONOCYTES RELATIVE PERCENT: 5.7 %
NEUTROPHILS ABSOLUTE: 8.9 K/UL (ref 2–7.5)
NEUTROPHILS RELATIVE PERCENT: 68.9 %
PDW BLD-RTO: 13.2 % (ref 11–16)
PLATELET # BLD: 407 K/UL (ref 150–400)
PMV BLD AUTO: 10 FL (ref 6–10)
POTASSIUM SERPL-SCNC: 4.1 MMOL/L (ref 3.4–5.1)
RBC # BLD: 4.37 M/UL (ref 3.8–5.8)
SODIUM BLD-SCNC: 137 MMOL/L (ref 136–145)
TOTAL PROTEIN: 7.9 G/DL (ref 6.4–8.3)
TROPONIN: <0.3 NG/ML
TROPONIN: <0.3 NG/ML
WBC # BLD: 12.9 K/UL (ref 4–11)

## 2019-01-07 PROCEDURE — 84484 ASSAY OF TROPONIN QUANT: CPT

## 2019-01-07 PROCEDURE — 36415 COLL VENOUS BLD VENIPUNCTURE: CPT

## 2019-01-07 PROCEDURE — 93005 ELECTROCARDIOGRAM TRACING: CPT

## 2019-01-07 PROCEDURE — 99285 EMERGENCY DEPT VISIT HI MDM: CPT

## 2019-01-07 PROCEDURE — 6370000000 HC RX 637 (ALT 250 FOR IP): Performed by: HOSPITALIST

## 2019-01-07 PROCEDURE — 71045 X-RAY EXAM CHEST 1 VIEW: CPT

## 2019-01-07 PROCEDURE — 80053 COMPREHEN METABOLIC PANEL: CPT

## 2019-01-07 PROCEDURE — 85025 COMPLETE CBC W/AUTO DIFF WBC: CPT

## 2019-01-07 RX ORDER — ATORVASTATIN CALCIUM 40 MG/1
40 TABLET, FILM COATED ORAL DAILY
COMMUNITY

## 2019-01-07 RX ORDER — FUROSEMIDE 20 MG/1
20 TABLET ORAL DAILY
COMMUNITY

## 2019-01-07 RX ORDER — SPIRONOLACTONE 25 MG/1
25 TABLET ORAL DAILY
COMMUNITY

## 2019-01-07 RX ORDER — ASPIRIN 81 MG/1
243 TABLET, CHEWABLE ORAL ONCE
Status: COMPLETED | OUTPATIENT
Start: 2019-01-07 | End: 2019-01-07

## 2019-01-07 RX ORDER — NITROGLYCERIN 0.4 MG/1
0.4 TABLET SUBLINGUAL EVERY 5 MIN PRN
COMMUNITY

## 2019-01-07 RX ORDER — CLOPIDOGREL BISULFATE 75 MG/1
75 TABLET ORAL DAILY
COMMUNITY

## 2019-01-07 RX ORDER — SERTRALINE HYDROCHLORIDE 25 MG/1
25 TABLET, FILM COATED ORAL DAILY
COMMUNITY
End: 2022-05-10

## 2019-01-07 RX ORDER — ACETAMINOPHEN 500 MG
500 TABLET ORAL EVERY 6 HOURS PRN
COMMUNITY

## 2019-01-07 RX ORDER — ASPIRIN 81 MG/1
81 TABLET, CHEWABLE ORAL DAILY
COMMUNITY

## 2019-01-07 RX ORDER — HYDROXYZINE HYDROCHLORIDE 25 MG/1
25 TABLET, FILM COATED ORAL 3 TIMES DAILY PRN
COMMUNITY

## 2019-01-07 RX ADMIN — ASPIRIN 81 MG 243 MG: 81 TABLET ORAL at 16:49

## 2019-01-07 ASSESSMENT — PAIN DESCRIPTION - DESCRIPTORS: DESCRIPTORS: SHARP

## 2019-01-07 ASSESSMENT — PAIN DESCRIPTION - PROGRESSION: CLINICAL_PROGRESSION: GRADUALLY WORSENING

## 2019-01-07 ASSESSMENT — PAIN DESCRIPTION - FREQUENCY: FREQUENCY: CONTINUOUS

## 2019-01-07 ASSESSMENT — PAIN DESCRIPTION - ORIENTATION: ORIENTATION: LEFT

## 2019-01-07 ASSESSMENT — PAIN DESCRIPTION - PAIN TYPE: TYPE: ACUTE PAIN

## 2019-01-07 ASSESSMENT — PAIN DESCRIPTION - LOCATION: LOCATION: CHEST

## 2019-01-07 ASSESSMENT — PAIN DESCRIPTION - ONSET: ONSET: SUDDEN

## 2019-01-07 ASSESSMENT — PAIN SCALES - GENERAL: PAINLEVEL_OUTOF10: 4

## 2019-01-17 ENCOUNTER — OFFICE VISIT (OUTPATIENT)
Dept: CARDIOLOGY | Facility: CLINIC | Age: 44
End: 2019-01-17

## 2019-01-17 VITALS
RESPIRATION RATE: 18 BRPM | WEIGHT: 184.8 LBS | SYSTOLIC BLOOD PRESSURE: 70 MMHG | HEART RATE: 78 BPM | HEIGHT: 62 IN | DIASTOLIC BLOOD PRESSURE: 50 MMHG | BODY MASS INDEX: 34.01 KG/M2

## 2019-01-17 DIAGNOSIS — E78.5 HYPERLIPIDEMIA LDL GOAL <70: ICD-10-CM

## 2019-01-17 DIAGNOSIS — I25.5 ISCHEMIC CARDIOMYOPATHY: ICD-10-CM

## 2019-01-17 DIAGNOSIS — I25.10 CORONARY ARTERY DISEASE INVOLVING NATIVE CORONARY ARTERY OF NATIVE HEART WITHOUT ANGINA PECTORIS: Primary | ICD-10-CM

## 2019-01-17 PROCEDURE — 99214 OFFICE O/P EST MOD 30 MIN: CPT | Performed by: INTERNAL MEDICINE

## 2019-01-17 PROCEDURE — 99406 BEHAV CHNG SMOKING 3-10 MIN: CPT | Performed by: INTERNAL MEDICINE

## 2019-01-17 NOTE — PROGRESS NOTES
Jennifer Dey  1975  43 y.o.  [phone]  Mobile phone not available      01/17/2019    Conner Kumar DO    No chief complaint on file.      Problem List:  1. Coronary artery disease:  a. Acute STEMI, 12/03/2018.  b. LHC, 12/03/2018: Successful PTCA/stent placement ×2 in the proximal and mid LAD using overlapping 2.5 x 18 mm Yvrose drug-eluting stents postdilated with a 3 mm noncompliant balloon. Noncritical disease in the circumflex and RCA. EF 25% and anterior akinesis with apical dyskinesis.  c. Echocardiogram, 12/05/2018: EF 20-25%. Trace-to-mild MR. Trace TR. No evidence of LV apical thrombus  d. LifeVest at discharge.  2. Hyperlipidemia.  3. Tobacco abuse, 2 PPD now down to 4-5 cigs/d.  4. Low back pain.    No Known Allergies    Current Medications:      Current Outpatient Medications:   •  acetaminophen (TYLENOL) 500 MG tablet, Take 1,000 mg by mouth Every 6 (Six) Hours As Needed for Mild Pain ., Disp: , Rfl:   •  aspirin 81 MG chewable tablet, Chew 1 tablet Daily., Disp: 30 tablet, Rfl: 11  •  atorvastatin (LIPITOR) 40 MG tablet, Take 1 tablet by mouth Every Night., Disp: 30 tablet, Rfl: 11  •  clopidogrel (PLAVIX) 75 MG tablet, Take 1 tablet by mouth Daily., Disp: 30 tablet, Rfl: 11  •  furosemide (LASIX) 40 MG tablet, Take 0.5 tablets by mouth Daily., Disp: 30 tablet, Rfl: 11  •  hydrOXYzine (ATARAX) 25 MG tablet, Take 1 tablet by mouth 3 (Three) Times a Day As Needed for Anxiety., Disp: 45 tablet, Rfl: 2  •  losartan (COZAAR) 25 MG tablet, Take 1 tablet by mouth Daily., Disp: 30 tablet, Rfl: 1  •  metoprolol tartrate (LOPRESSOR) 25 MG tablet, Take 0.5 tablets by mouth Every 12 (Twelve) Hours., Disp: 60 tablet, Rfl: 11  •  nicotine (NICODERM CQ) 21 MG/24HR patch, Place 1 patch on the skin as directed by provider Daily., Disp: 28 each, Rfl: 0  •  nitroglycerin (NITROSTAT) 0.4 MG SL tablet, Place 1 tablet under the tongue Every 5 (Five) Minutes As Needed for Chest Pain. Take no more than 3  "doses in 15 minutes., Disp: 25 tablet, Rfl: 12  •  sertraline (ZOLOFT) 25 MG tablet, Take 1 tablet by mouth Daily., Disp: 30 tablet, Rfl: 2  •  spironolactone (ALDACTONE) 25 MG tablet, Take 1 tablet by mouth Daily., Disp: 30 tablet, Rfl: 11    HPI    Jennifer Dey is a 43 y.o. female who presents today for 1 month hospital follow up s/p catheterization for acute STEMI. She has been experiencing some dizziness, but has otherwise been doing well from a cardiovascular standpoint. She has been remaining physically active with walking 5 minute, 4 times per day. Patient denies chest pain, palpitations, shortness of breath, edema, and syncope. She admits she still smokes, but has greatly decreased the amount (down to 4-5 cigarettes per day).    The following portions of the patient's history were reviewed and updated as appropriate: allergies, current medications and problem list.    Pertinent positives as listed in the HPI.  All other systems reviewed are negative.    Vitals:    01/17/19 1133   BP: (!) 70/50   BP Location: Right arm   Patient Position: Sitting   Pulse: 78   Resp: 18   Weight: 83.8 kg (184 lb 12.8 oz)   Height: 157.5 cm (62\")       Physical Exam:    General: Alert and oriented  Neck: Jugular venous pressure is within normal limits. Carotids have normal upstrokes without bruits.   Cardiovascular: Heart has a nondisplaced focal PMI. Regular rate and rhythm without murmur, gallop or rub.  Lungs: Clear without rales or wheezes. Equal expansion is noted.   Extremities: Show no edema.  Skin: warm and dry.  Neurologic: nonfocal    Diagnostic Data:  Lab Results   Component Value Date    GLUCOSE 118 (H) 12/13/2018    BUN 11 12/13/2018    CREATININE 0.82 12/13/2018    EGFRIFNONA 75 12/13/2018    BCR 13.4 12/13/2018     12/13/2018    K 4.2 12/13/2018     12/13/2018    CO2 25.0 12/13/2018    CALCIUM 9.5 12/13/2018    ALBUMIN 4.37 12/03/2018     (H) 12/03/2018    ALT 76 (H) 12/03/2018     Lab " Results   Component Value Date    CHOL 254 (H) 12/04/2018    TRIG 230 (H) 12/04/2018    HDL 30 (L) 12/04/2018     (H) 12/04/2018      Lab Results   Component Value Date    WBC 18.93 (H) 12/05/2018    HGB 11.3 (L) 12/05/2018    HCT 35.7 12/05/2018    MCV 97.3 12/05/2018     12/05/2018     Lab Results   Component Value Date    TSH 0.831 12/04/2018       Procedures    Assessment:      ICD-10-CM ICD-9-CM   1. Coronary artery disease involving native coronary artery of native heart without angina pectoris I25.10 414.01   2. Hyperlipidemia LDL goal <70 E78.5 272.4       Plan:    1. Gradually increase physical activity to 30 minutes daily.  2. Tobacco cessation counseling given.  3. Echocardiogram in March 2019 prior to next visit to assess heart and valve anatomy and function.  4. DC losartan as patient has had low blood pressures associated with dizziness. Continue metoprolol, spironolactone, and lasix for CHF.  5. Continue aspirin 81 mg and Plavix 75 mg for anticoagulation s/p stent placement.  6. Continue atorvastatin 40 mg for hyperlipidemia.  7. Smoking cessation counseling  8. Continue all other current medications.  9. F/up on 03/07/2019 or sooner if needed.    Scribed for Elen Boothe MD by Naty Taylor. 1/17/2019  11:47 AM     I Elen Boothe MD personally performed the services described in this documentation as scribed by the above individual in my presence, and it is both accurate and complete.    Elen Boothe MD, [facility]

## 2019-01-23 ENCOUNTER — OFFICE VISIT (OUTPATIENT)
Dept: INTERNAL MEDICINE | Facility: CLINIC | Age: 44
End: 2019-01-23

## 2019-01-23 VITALS
OXYGEN SATURATION: 98 % | HEART RATE: 92 BPM | WEIGHT: 183 LBS | HEIGHT: 62 IN | DIASTOLIC BLOOD PRESSURE: 77 MMHG | SYSTOLIC BLOOD PRESSURE: 114 MMHG | BODY MASS INDEX: 33.68 KG/M2 | TEMPERATURE: 97 F

## 2019-01-23 DIAGNOSIS — I50.21 ACUTE SYSTOLIC HEART FAILURE (HCC): ICD-10-CM

## 2019-01-23 DIAGNOSIS — Z00.00 HEALTH MAINTENANCE EXAMINATION: Primary | ICD-10-CM

## 2019-01-23 DIAGNOSIS — Z23 NEED FOR DIPHTHERIA-TETANUS-PERTUSSIS (TDAP) VACCINE: ICD-10-CM

## 2019-01-23 DIAGNOSIS — Z72.0 TOBACCO ABUSE: ICD-10-CM

## 2019-01-23 DIAGNOSIS — L30.9 HAND DERMATITIS: ICD-10-CM

## 2019-01-23 DIAGNOSIS — E66.09 CLASS 2 OBESITY DUE TO EXCESS CALORIES WITHOUT SERIOUS COMORBIDITY WITH BODY MASS INDEX (BMI) OF 36.0 TO 36.9 IN ADULT: ICD-10-CM

## 2019-01-23 DIAGNOSIS — I25.10 CORONARY ARTERY DISEASE INVOLVING NATIVE CORONARY ARTERY OF NATIVE HEART WITHOUT ANGINA PECTORIS: ICD-10-CM

## 2019-01-23 DIAGNOSIS — N39.3 STRESS INCONTINENCE: ICD-10-CM

## 2019-01-23 PROCEDURE — 99406 BEHAV CHNG SMOKING 3-10 MIN: CPT | Performed by: INTERNAL MEDICINE

## 2019-01-23 PROCEDURE — 99396 PREV VISIT EST AGE 40-64: CPT | Performed by: INTERNAL MEDICINE

## 2019-01-23 RX ORDER — BETAMETHASONE DIPROPIONATE 0.5 MG/G
CREAM TOPICAL 2 TIMES DAILY
Qty: 15 G | Refills: 2 | Status: SHIPPED | OUTPATIENT
Start: 2019-01-23 | End: 2020-07-29

## 2019-01-23 RX ORDER — BUPROPION HYDROCHLORIDE 150 MG/1
150 TABLET ORAL DAILY
Qty: 30 TABLET | Refills: 3 | Status: SHIPPED | OUTPATIENT
Start: 2019-01-23 | End: 2019-05-27 | Stop reason: SDUPTHER

## 2019-01-23 NOTE — PROGRESS NOTES
Chief Complaint   Patient presents with   • Annual Exam     here for physical; patient is having bad dreams when taking hydroxyzine       Subjective     History of Present Illness   Jennifer Dey is a 43 y.o. female presenting for annual physical.  Preventive health maintenance was reviewed and discussed today. Followed up with cardiology and had hypotension.  Losartan was discontinued.  She is interested in quitting smoking, currently smoking 5 cigarettes per day.  Hydroxyzine was causing panic attacks at night and she has stopped this medication.    The following portions of the patient's history were reviewed and updated as appropriate: allergies, current medications, past family history, past medical history, past social history, past surgical history and problem list.    Review of Systems   Constitutional: Negative for chills, fatigue and fever.   HENT: Negative for congestion, ear pain, rhinorrhea, sinus pressure and sore throat.    Eyes: Negative for visual disturbance.   Respiratory: Negative for cough, chest tightness, shortness of breath and wheezing.    Cardiovascular: Negative for chest pain, palpitations and leg swelling.   Gastrointestinal: Negative for abdominal pain, blood in stool, constipation, diarrhea, nausea and vomiting.   Endocrine: Negative for polydipsia and polyuria.   Genitourinary: Negative for dysuria and hematuria.   Musculoskeletal: Negative for back pain.   Skin: Negative for rash.   Neurological: Negative for dizziness, light-headedness, numbness and headaches.   Psychiatric/Behavioral: Negative for dysphoric mood and sleep disturbance. The patient is nervous/anxious.        No Known Allergies    Past Medical History:   Diagnosis Date   • CAD (coronary artery disease)    • Current smoker 12/3/2018   • Hypothyroidism not on replacement 12/03/2018   • Low back pain 12/3/2018   • MTHFR mutation (CMS/[facility]) 12/03/2018   • SHANTA denies CPAP use 12/03/2018   • Stress incontinence 12/03/2018        Social History     Socioeconomic History   • Marital status: Legally      Spouse name: Not on file   • Number of children: 2   • Years of education: Not on file   • Highest education level: Not on file   Social Needs   • Financial resource strain: Somewhat hard   • Food insecurity - worry: Never true   • Food insecurity - inability: Never true   • Transportation needs - medical: No   • Transportation needs - non-medical: No   Occupational History   • Occupation: babysitting   Tobacco Use   • Smoking status: Former Smoker     Packs/day: 2.00     Years: 30.00     Pack years: 60.00     Last attempt to quit: 12/3/2018     Years since quittin.1   • Smokeless tobacco: Never Used   Substance and Sexual Activity   • Alcohol use: No     Frequency: Never   • Drug use: No   • Sexual activity: Defer   Other Topics Concern   • Not on file   Social History Narrative    Caffeine Intake :0-1  servings per day    Patient lives at home with her daughter         Past Surgical History:   Procedure Laterality Date   • CARDIAC CATHETERIZATION N/A 12/3/2018    Procedure: LEFT HEART CATH;  Surgeon: Elen Boothe MD;  Location: [facility] INVASIVE LOCATION;  Service: Cardiology   • CYSTOSCOPY     • TOTAL ABDOMINAL HYSTERECTOMY WITH SALPINGO OOPHORECTOMY     • TUBAL ABDOMINAL LIGATION     • WRIST FRACTURE SURGERY         Family History   Problem Relation Age of Onset   • Heart disease Mother    • Breast cancer Mother    • Alzheimer's disease Mother    • Heart disease Father    • Heart disease Sister    • No Known Problems Brother    • Colon cancer Maternal Grandmother    • Heart disease Maternal Grandfather    • Heart disease Paternal Grandfather          Current Outpatient Medications:   •  acetaminophen (TYLENOL) 500 MG tablet, Take 1,000 mg by mouth Every 6 (Six) Hours As Needed for Mild Pain ., Disp: , Rfl:   •  aspirin 81 MG chewable tablet, Chew 1 tablet Daily., Disp: 30 tablet, Rfl: 11  •   "atorvastatin (LIPITOR) 40 MG tablet, Take 1 tablet by mouth Every Night., Disp: 30 tablet, Rfl: 11  •  clopidogrel (PLAVIX) 75 MG tablet, Take 1 tablet by mouth Daily., Disp: 30 tablet, Rfl: 11  •  furosemide (LASIX) 40 MG tablet, Take 0.5 tablets by mouth Daily., Disp: 30 tablet, Rfl: 11  •  hydrOXYzine (ATARAX) 25 MG tablet, Take 1 tablet by mouth 3 (Three) Times a Day As Needed for Anxiety., Disp: 45 tablet, Rfl: 2  •  metoprolol tartrate (LOPRESSOR) 25 MG tablet, Take 0.5 tablets by mouth Every 12 (Twelve) Hours., Disp: 60 tablet, Rfl: 11  •  nicotine (NICODERM CQ) 21 MG/24HR patch, Place 1 patch on the skin as directed by provider Daily., Disp: 28 each, Rfl: 0  •  nitroglycerin (NITROSTAT) 0.4 MG SL tablet, Place 1 tablet under the tongue Every 5 (Five) Minutes As Needed for Chest Pain. Take no more than 3 doses in 15 minutes., Disp: 25 tablet, Rfl: 12  •  sertraline (ZOLOFT) 25 MG tablet, Take 1 tablet by mouth Daily., Disp: 30 tablet, Rfl: 2  •  spironolactone (ALDACTONE) 25 MG tablet, Take 1 tablet by mouth Daily., Disp: 30 tablet, Rfl: 11  •  betamethasone dipropionate (DIPROLENE) 0.05 % cream, Apply  topically to the appropriate area as directed 2 (Two) Times a Day., Disp: 15 g, Rfl: 2  •  buPROPion XL (WELLBUTRIN XL) 150 MG 24 hr tablet, Take 1 tablet by mouth Daily., Disp: 30 tablet, Rfl: 3  •  Tdap (BOOSTRIX) 5-2.5-18.5 LF-MCG/0.5 injection, Inject 0.5 mL into the appropriate muscle as directed by prescriber 1 (One) Time for 1 dose., Disp: 0.5 mL, Rfl: 0    Objective   /77 (BP Location: Left arm)   Pulse 92   Temp 97 °F (36.1 °C)   Ht 157.5 cm (62\")   Wt 83 kg (183 lb)   SpO2 98%   BMI 33.47 kg/m²     Physical Exam   Constitutional: She is oriented to person, place, and time. She appears well-nourished. No distress.   obese   HENT:   Head: Atraumatic.   Right Ear: External ear normal.   Left Ear: External ear normal.   Eyes: Conjunctivae are normal. Right eye exhibits no discharge. Left eye " exhibits no discharge.   Neck: Normal range of motion. Neck supple.   Cardiovascular: Normal rate and regular rhythm.   No murmur heard.  Pulmonary/Chest: Effort normal and breath sounds normal. She has no wheezes. She has no rales.   Abdominal: Soft. Bowel sounds are normal. She exhibits no distension. There is no tenderness.   Neurological: She is alert and oriented to person, place, and time.   Skin: No rash noted. She is not diaphoretic.   Psychiatric: She has a normal mood and affect.   Nursing note and vitals reviewed.      Assessment/Plan   Jennifer was seen today for annual exam.    Diagnoses and all orders for this visit:    Health maintenance examination  -     Ambulatory Referral to Obstetrics / Gynecology  -     CBC & Differential; Future  -     Comprehensive Metabolic Panel; Future  -     Lipid Panel; Future  -     Hemoglobin A1c; Future    Tobacco abuse  -     buPROPion XL (WELLBUTRIN XL) 150 MG 24 hr tablet; Take 1 tablet by mouth Daily.    Need for diphtheria-tetanus-pertussis (Tdap) vaccine  -     Tdap (BOOSTRIX) 5-2.5-18.5 LF-MCG/0.5 injection; Inject 0.5 mL into the appropriate muscle as directed by prescriber 1 (One) Time for 1 dose.    Stress incontinence    Class 2 obesity due to excess calories without serious comorbidity with body mass index (BMI) of 36.0 to 36.9 in adult  -     CBC & Differential; Future  -     Comprehensive Metabolic Panel; Future  -     Lipid Panel; Future  -     Hemoglobin A1c; Future    Coronary artery disease involving native coronary artery of native heart without angina pectoris    Acute systolic heart failure (CMS/HCC)  -     CBC & Differential; Future  -     Comprehensive Metabolic Panel; Future  -     Lipid Panel; Future  -     Hemoglobin A1c; Future    Hand dermatitis  -     betamethasone dipropionate (DIPROLENE) 0.05 % cream; Apply  topically to the appropriate area as directed 2 (Two) Times a Day.          Discussion Summary:    1. Preventive Health  Maintenance  - Baseline labs ordered per above.  - Vaccines reviewed and updated  - Preventive health measures were discussed including: healthy diet with increase in fruits and vegetables, regular exercise at least 3 times a week, safe sex practices, avoidance of drugs, tobacco, and alcohol, and regular seatbelt use.    2. Tobacco abuse  - 5 minutes were spent with tobacco cessation counselling. Treatment options were discussed including nicotine replacement therapy, Wellbutrin, and Chantix. The patient aims to quit tobacco use on 4/1/19 with Wellbutrin.    3. Obesity  - Weight loss options were discussed in detail including reducing fried, fatty, and sugary foods with diet control. A regular exercise regimen was discussed.    4. Hand Dermatitis  - will try steroid cream      Follow up:  Return in about 1 month (around 2/23/2019) for Next scheduled follow up.     Patient Instructions:  Patient instructions were provided.

## 2019-01-23 NOTE — PATIENT INSTRUCTIONS
Health Maintenance, Female  Adopting a healthy lifestyle and getting preventive care can go a long way to promote health and wellness. Talk with your health care provider about what schedule of regular examinations is right for you. This is a good chance for you to check in with your provider about disease prevention and staying healthy.  In between checkups, there are plenty of things you can do on your own. Experts have done a lot of research about which lifestyle changes and preventive measures are most likely to keep you healthy. Ask your health care provider for more information.  Weight and diet  Eat a healthy diet  · Be sure to include plenty of vegetables, fruits, low-fat dairy products, and lean protein.  · Do not eat a lot of foods high in solid fats, added sugars, or salt.  · Get regular exercise. This is one of the most important things you can do for your health.  ? Most adults should exercise for at least 150 minutes each week. The exercise should increase your heart rate and make you sweat (moderate-intensity exercise).  ? Most adults should also do strengthening exercises at least twice a week. This is in addition to the moderate-intensity exercise.    Maintain a healthy weight  · Body mass index (BMI) is a measurement that can be used to identify possible weight problems. It estimates body fat based on height and weight. Your health care provider can help determine your BMI and help you achieve or maintain a healthy weight.  · For females 20 years of age and older:  ? A BMI below 18.5 is considered underweight.  ? A BMI of 18.5 to 24.9 is normal.  ? A BMI of 25 to 29.9 is considered overweight.  ? A BMI of 30 and above is considered obese.    Watch levels of cholesterol and blood lipids  · You should start having your blood tested for lipids and cholesterol at 20 years of age, then have this test every 5 years.  · You may need to have your cholesterol levels checked more often if:  ? Your lipid or  cholesterol levels are high.  ? You are older than 50 years of age.  ? You are at high risk for heart disease.    Cancer screening  Lung Cancer  · Lung cancer screening is recommended for adults 55-80 years old who are at high risk for lung cancer because of a history of smoking.  · A yearly low-dose CT scan of the lungs is recommended for people who:  ? Currently smoke.  ? Have quit within the past 15 years.  ? Have at least a 30-pack-year history of smoking. A pack year is smoking an average of one pack of cigarettes a day for 1 year.  · Yearly screening should continue until it has been 15 years since you quit.  · Yearly screening should stop if you develop a health problem that would prevent you from having lung cancer treatment.    Breast Cancer  · Practice breast self-awareness. This means understanding how your breasts normally appear and feel.  · It also means doing regular breast self-exams. Let your health care provider know about any changes, no matter how small.  · If you are in your 20s or 30s, you should have a clinical breast exam (CBE) by a health care provider every 1-3 years as part of a regular health exam.  · If you are 40 or older, have a CBE every year. Also consider having a breast X-ray (mammogram) every year.  · If you have a family history of breast cancer, talk to your health care provider about genetic screening.  · If you are at high risk for breast cancer, talk to your health care provider about having an MRI and a mammogram every year.  · Breast cancer gene (BRCA) assessment is recommended for women who have family members with BRCA-related cancers. BRCA-related cancers include:  ? Breast.  ? Ovarian.  ? Tubal.  ? Peritoneal cancers.  · Results of the assessment will determine the need for genetic counseling and BRCA1 and BRCA2 testing.    Cervical Cancer  Your health care provider may recommend that you be screened regularly for cancer of the pelvic organs (ovaries, uterus, and  vagina). This screening involves a pelvic examination, including checking for microscopic changes to the surface of your cervix (Pap test). You may be encouraged to have this screening done every 3 years, beginning at age 21.  · For women ages 30-65, health care providers may recommend pelvic exams and Pap testing every 3 years, or they may recommend the Pap and pelvic exam, combined with testing for human papilloma virus (HPV), every 5 years. Some types of HPV increase your risk of cervical cancer. Testing for HPV may also be done on women of any age with unclear Pap test results.  · Other health care providers may not recommend any screening for nonpregnant women who are considered low risk for pelvic cancer and who do not have symptoms. Ask your health care provider if a screening pelvic exam is right for you.  · If you have had past treatment for cervical cancer or a condition that could lead to cancer, you need Pap tests and screening for cancer for at least 20 years after your treatment. If Pap tests have been discontinued, your risk factors (such as having a new sexual partner) need to be reassessed to determine if screening should resume. Some women have medical problems that increase the chance of getting cervical cancer. In these cases, your health care provider may recommend more frequent screening and Pap tests.    Colorectal Cancer  · This type of cancer can be detected and often prevented.  · Routine colorectal cancer screening usually begins at 50 years of age and continues through 75 years of age.  · Your health care provider may recommend screening at an earlier age if you have risk factors for colon cancer.  · Your health care provider may also recommend using home test kits to check for hidden blood in the stool.  · A small camera at the end of a tube can be used to examine your colon directly (sigmoidoscopy or colonoscopy). This is done to check for the earliest forms of colorectal  cancer.  · Routine screening usually begins at age 50.  · Direct examination of the colon should be repeated every 5-10 years through 75 years of age. However, you may need to be screened more often if early forms of precancerous polyps or small growths are found.    Skin Cancer  · Check your skin from head to toe regularly.  · Tell your health care provider about any new moles or changes in moles, especially if there is a change in a mole's shape or color.  · Also tell your health care provider if you have a mole that is larger than the size of a pencil eraser.  · Always use sunscreen. Apply sunscreen liberally and repeatedly throughout the day.  · Protect yourself by wearing long sleeves, pants, a wide-brimmed hat, and sunglasses whenever you are outside.    Heart disease, diabetes, and high blood pressure  · High blood pressure causes heart disease and increases the risk of stroke. High blood pressure is more likely to develop in:  ? People who have blood pressure in the high end of the normal range (130-139/85-89 mm Hg).  ? People who are overweight or obese.  ? People who are .  · If you are 18-39 years of age, have your blood pressure checked every 3-5 years. If you are 40 years of age or older, have your blood pressure checked every year. You should have your blood pressure measured twice--once when you are at a hospital or clinic, and once when you are not at a hospital or clinic. Record the average of the two measurements. To check your blood pressure when you are not at a hospital or clinic, you can use:  ? An automated blood pressure machine at a pharmacy.  ? A home blood pressure monitor.  · If you are between 55 years and 79 years old, ask your health care provider if you should take aspirin to prevent strokes.  · Have regular diabetes screenings. This involves taking a blood sample to check your fasting blood sugar level.  ? If you are at a normal weight and have a low risk for  diabetes, have this test once every three years after 45 years of age.  ? If you are overweight and have a high risk for diabetes, consider being tested at a younger age or more often.  Preventing infection  Hepatitis B  · If you have a higher risk for hepatitis B, you should be screened for this virus. You are considered at high risk for hepatitis B if:  ? You were born in a country where hepatitis B is common. Ask your health care provider which countries are considered high risk.  ? Your parents were born in a high-risk country, and you have not been immunized against hepatitis B (hepatitis B vaccine).  ? You have HIV or AIDS.  ? You use needles to inject street drugs.  ? You live with someone who has hepatitis B.  ? You have had sex with someone who has hepatitis B.  ? You get hemodialysis treatment.  ? You take certain medicines for conditions, including cancer, organ transplantation, and autoimmune conditions.    Hepatitis C  · Blood testing is recommended for:  ? Everyone born from 1945 through 1965.  ? Anyone with known risk factors for hepatitis C.    Sexually transmitted infections (STIs)  · You should be screened for sexually transmitted infections (STIs) including gonorrhea and chlamydia if:  ? You are sexually active and are younger than 24 years of age.  ? You are older than 24 years of age and your health care provider tells you that you are at risk for this type of infection.  ? Your sexual activity has changed since you were last screened and you are at an increased risk for chlamydia or gonorrhea. Ask your health care provider if you are at risk.  · If you do not have HIV, but are at risk, it may be recommended that you take a prescription medicine daily to prevent HIV infection. This is called pre-exposure prophylaxis (PrEP). You are considered at risk if:  ? You are sexually active and do not regularly use condoms or know the HIV status of your partner(s).  ? You take drugs by injection.  ? You  are sexually active with a partner who has HIV.    Talk with your health care provider about whether you are at high risk of being infected with HIV. If you choose to begin PrEP, you should first be tested for HIV. You should then be tested every 3 months for as long as you are taking PrEP.  Pregnancy  · If you are premenopausal and you may become pregnant, ask your health care provider about preconception counseling.  · If you may become pregnant, take 400 to 800 micrograms (mcg) of folic acid every day.  · If you want to prevent pregnancy, talk to your health care provider about birth control (contraception).  Osteoporosis and menopause  · Osteoporosis is a disease in which the bones lose minerals and strength with aging. This can result in serious bone fractures. Your risk for osteoporosis can be identified using a bone density scan.  · If you are 65 years of age or older, or if you are at risk for osteoporosis and fractures, ask your health care provider if you should be screened.  · Ask your health care provider whether you should take a calcium or vitamin D supplement to lower your risk for osteoporosis.  · Menopause may have certain physical symptoms and risks.  · Hormone replacement therapy may reduce some of these symptoms and risks.  Talk to your health care provider about whether hormone replacement therapy is right for you.  Follow these instructions at home:  · Schedule regular health, dental, and eye exams.  · Stay current with your immunizations.  · Do not use any tobacco products including cigarettes, chewing tobacco, or electronic cigarettes.  · If you are pregnant, do not drink alcohol.  · If you are breastfeeding, limit how much and how often you drink alcohol.  · Limit alcohol intake to no more than 1 drink per day for nonpregnant women. One drink equals 12 ounces of beer, 5 ounces of wine, or 1½ ounces of hard liquor.  · Do not use street drugs.  · Do not share needles.  · Ask your health care  provider for help if you need support or information about quitting drugs.  · Tell your health care provider if you often feel depressed.  · Tell your health care provider if you have ever been abused or do not feel safe at home.  This information is not intended to replace advice given to you by your health care provider. Make sure you discuss any questions you have with your health care provider.  Document Released: 07/02/2012 Document Revised: 05/25/2017 Document Reviewed: 09/20/2016  [employer] Interactive Patient Education © 2018 [employer]  For more information:    Quit Now [place]  1-800-QUIT-NOW  https://[email or web address].quitlogix.org/en-US/  Steps to Quit Smoking  Smoking tobacco can be harmful to your health and can affect almost every organ in your body. Smoking puts you, and those around you, at risk for developing many serious chronic diseases. Quitting smoking is difficult, but it is one of the best things that you can do for your health. It is never too late to quit.  What are the benefits of quitting smoking?  When you quit smoking, you lower your risk of developing serious diseases and conditions, such as:  · Lung cancer or lung disease, such as COPD.  · Heart disease.  · Stroke.  · Heart attack.  · Infertility.  · Osteoporosis and bone fractures.  Additionally, symptoms such as coughing, wheezing, and shortness of breath may get better when you quit. You may also find that you get sick less often because your body is stronger at fighting off colds and infections. If you are pregnant, quitting smoking can help to reduce your chances of having a baby of low birth weight.  How do I get ready to quit?  When you decide to quit smoking, create a plan to make sure that you are successful. Before you quit:  · Pick a date to quit. Set a date within the next two weeks to give you time to prepare.  · Write down the reasons why you are quitting. Keep this list in places where you will see it often, such as on your  bathroom mirror or in your car or wallet.  · Identify the people, places, things, and activities that make you want to smoke (triggers) and avoid them. Make sure to take these actions:  ¨ Throw away all cigarettes at home, at work, and in your car.  ¨ Throw away smoking accessories, such as ashtrays and lighters.  ¨ Clean your car and make sure to empty the ashtray.  ¨ Clean your home, including curtains and carpets.  · Tell your family, friends, and coworkers that you are quitting. Support from your loved ones can make quitting easier.  · Talk with your health care provider about your options for quitting smoking.  · Find out what treatment options are covered by your health insurance.  What strategies can I use to quit smoking?  Talk with your healthcare provider about different strategies to quit smoking. Some strategies include:  · Quitting smoking altogether instead of gradually lessening how much you smoke over a period of time. Research shows that quitting “cold turkey” is more successful than gradually quitting.  · Attending in-person counseling to help you build problem-solving skills. You are more likely to have success in quitting if you attend several counseling sessions. Even short sessions of 10 minutes can be effective.  · Finding resources and support systems that can help you to quit smoking and remain smoke-free after you quit. These resources are most helpful when you use them often. They can include:  ¨ Online chats with a counselor.  ¨ Telephone quitlines.  ¨ Printed self-help materials.  ¨ Support groups or group counseling.  ¨ Text messaging programs.  ¨ Mobile phone applications.  · Taking medicines to help you quit smoking. (If you are pregnant or breastfeeding, talk with your health care provider first.) Some medicines contain nicotine and some do not. Both types of medicines help with cravings, but the medicines that include nicotine help to relieve withdrawal symptoms. Your health care  provider may recommend:  ¨ Nicotine patches, gum, or lozenges.  ¨ Nicotine inhalers or sprays.  ¨ Non-nicotine medicine that is taken by mouth.  Talk with your health care provider about combining strategies, such as taking medicines while you are also receiving in-person counseling. Using these two strategies together makes you more likely to succeed in quitting than if you used either strategy on its own.  If you are pregnant or breastfeeding, talk with your health care provider about finding counseling or other support strategies to quit smoking. Do not take medicine to help you quit smoking unless told to do so by your health care provider.  What things can I do to make it easier to quit?  Quitting smoking might feel overwhelming at first, but there is a lot that you can do to make it easier. Take these important actions:  · Reach out to your family and friends and ask that they support and encourage you during this time. Call telephone quitlines, reach out to support groups, or work with a counselor for support.  · Ask people who smoke to avoid smoking around you.  · Avoid places that trigger you to smoke, such as bars, parties, or smoke-break areas at work.  · Spend time around people who do not smoke.  · Lessen stress in your life, because stress can be a smoking trigger for some people. To lessen stress, try:  ¨ Exercising regularly.  ¨ Deep-breathing exercises.  ¨ Yoga.  ¨ Meditating.  ¨ Performing a body scan. This involves closing your eyes, scanning your body from head to toe, and noticing which parts of your body are particularly tense. Purposefully relax the muscles in those areas.  · Download or purchase mobile phone or tablet apps (applications) that can help you stick to your quit plan by providing reminders, tips, and encouragement. There are many free apps, such as QuitGuide from the CDC (Centers for Disease Control and Prevention). You can find other support for quitting smoking (smoking  cessation) through smokefree.gov and other websites.  How will I feel when I quit smoking?  Within the first 24 hours of quitting smoking, you may start to feel some withdrawal symptoms. These symptoms are usually most noticeable 2-3 days after quitting, but they usually do not last beyond 2-3 weeks. Changes or symptoms that you might experience include:  · Mood swings.  · Restlessness, anxiety, or irritation.  · Difficulty concentrating.  · Dizziness.  · Strong cravings for sugary foods in addition to nicotine.  · Mild weight gain.  · Constipation.  · Nausea.  · Coughing or a sore throat.  · Changes in how your medicines work in your body.  · A depressed mood.  · Difficulty sleeping (insomnia).  After the first 2-3 weeks of quitting, you may start to notice more positive results, such as:  · Improved sense of smell and taste.  · Decreased coughing and sore throat.  · Slower heart rate.  · Lower blood pressure.  · Clearer skin.  · The ability to breathe more easily.  · Fewer sick days.  Quitting smoking is very challenging for most people. Do not get discouraged if you are not successful the first time. Some people need to make many attempts to quit before they achieve long-term success. Do your best to stick to your quit plan, and talk with your health care provider if you have any questions or concerns.  This information is not intended to replace advice given to you by your health care provider. Make sure you discuss any questions you have with your health care provider.  Document Released: 12/12/2002 Document Revised: 08/15/2017 Document Reviewed: 05/03/2016  [employer] Interactive Patient Education © 2017 [employer]

## 2019-01-28 ENCOUNTER — RESULTS ENCOUNTER (OUTPATIENT)
Dept: INTERNAL MEDICINE | Facility: CLINIC | Age: 44
End: 2019-01-28

## 2019-01-28 DIAGNOSIS — Z00.00 HEALTH MAINTENANCE EXAMINATION: ICD-10-CM

## 2019-01-28 DIAGNOSIS — I50.21 ACUTE SYSTOLIC HEART FAILURE (HCC): ICD-10-CM

## 2019-01-28 DIAGNOSIS — E66.09 CLASS 2 OBESITY DUE TO EXCESS CALORIES WITHOUT SERIOUS COMORBIDITY WITH BODY MASS INDEX (BMI) OF 36.0 TO 36.9 IN ADULT: ICD-10-CM

## 2019-02-04 RX ORDER — LOSARTAN POTASSIUM 25 MG/1
TABLET ORAL
Qty: 30 TABLET | Refills: 1 | OUTPATIENT
Start: 2019-02-04

## 2019-02-04 NOTE — TELEPHONE ENCOUNTER
Losartan discontinued by Dr. Boothe, removed from list on 1/23/19. Refill refused today.    Faizan OrtezD

## 2019-02-05 ENCOUNTER — TELEPHONE (OUTPATIENT)
Dept: CARDIOLOGY | Facility: CLINIC | Age: 44
End: 2019-02-05

## 2019-02-05 NOTE — TELEPHONE ENCOUNTER
LM on VM-     PT has lost her insurance and they are asking what medications she can go without-   I left a msg stating that she must continue Plavix, ASA, Metoprolol, and diuretics- very important to continue plavix and ASA and BP meds-     She may fax [place] paperwork and I will extend for PT's upcoming appt

## 2019-02-12 ENCOUNTER — TELEPHONE (OUTPATIENT)
Dept: INTERNAL MEDICINE | Facility: CLINIC | Age: 44
End: 2019-02-12

## 2019-02-12 NOTE — TELEPHONE ENCOUNTER
Margoth calling on behalf of pt. Requesting an order for labs to check cholesterol to be sent over to [facility] (says now it is called [facility] in [place]). Please advise.

## 2019-02-13 ENCOUNTER — HOSPITAL ENCOUNTER (OUTPATIENT)
Facility: HOSPITAL | Age: 44
Discharge: HOME | End: 2019-02-13
Payer: COMMERCIAL

## 2019-02-13 LAB
A/G RATIO: 1.5 (ref 0.8–2)
ALBUMIN SERPL-MCNC: 4.3 G/DL (ref 3.4–4.8)
ALP BLD-CCNC: 128 U/L (ref 25–100)
ALT SERPL-CCNC: 15 U/L (ref 4–36)
ANION GAP SERPL CALCULATED.3IONS-SCNC: 14 MMOL/L (ref 3–16)
AST SERPL-CCNC: 15 U/L (ref 8–33)
BASOPHILS ABSOLUTE: 0.1 K/UL (ref 0–0.1)
BASOPHILS RELATIVE PERCENT: 0.6 %
BILIRUB SERPL-MCNC: <0.2 MG/DL (ref 0.3–1.2)
BUN BLDV-MCNC: 10 MG/DL (ref 6–20)
CALCIUM SERPL-MCNC: 9.6 MG/DL (ref 8.5–10.5)
CHLORIDE BLD-SCNC: 101 MMOL/L (ref 98–107)
CHOLESTEROL, TOTAL: 180 MG/DL (ref 0–200)
CO2: 22 MMOL/L (ref 20–30)
CREAT SERPL-MCNC: 0.8 MG/DL (ref 0.4–1.2)
EOSINOPHILS ABSOLUTE: 0.3 K/UL (ref 0–0.4)
EOSINOPHILS RELATIVE PERCENT: 2.8 %
GFR AFRICAN AMERICAN: >59
GFR NON-AFRICAN AMERICAN: >60
GLOBULIN: 2.8 G/DL
GLUCOSE BLD-MCNC: 144 MG/DL (ref 74–106)
HBA1C MFR BLD: 6.2 %
HCT VFR BLD CALC: 42.9 % (ref 37–47)
HDLC SERPL-MCNC: 30 MG/DL (ref 40–60)
HEMOGLOBIN: 13.5 G/DL (ref 11.5–16.5)
IMMATURE GRANULOCYTES #: 0 K/UL
IMMATURE GRANULOCYTES %: 0.2 % (ref 0–5)
LDL CHOLESTEROL CALCULATED: 99 MG/DL
LYMPHOCYTES ABSOLUTE: 2.7 K/UL (ref 1.5–4)
LYMPHOCYTES RELATIVE PERCENT: 29.5 %
MCH RBC QN AUTO: 28.7 PG (ref 27–32)
MCHC RBC AUTO-ENTMCNC: 31.5 G/DL (ref 31–35)
MCV RBC AUTO: 91.3 FL (ref 80–100)
MONOCYTES ABSOLUTE: 0.6 K/UL (ref 0.2–0.8)
MONOCYTES RELATIVE PERCENT: 6.7 %
NEUTROPHILS ABSOLUTE: 5.4 K/UL (ref 2–7.5)
NEUTROPHILS RELATIVE PERCENT: 60.2 %
PDW BLD-RTO: 13.8 % (ref 11–16)
PLATELET # BLD: 333 K/UL (ref 150–400)
PMV BLD AUTO: 10.2 FL (ref 6–10)
POTASSIUM SERPL-SCNC: 4.3 MMOL/L (ref 3.4–5.1)
RBC # BLD: 4.7 M/UL (ref 3.8–5.8)
SODIUM BLD-SCNC: 137 MMOL/L (ref 136–145)
TOTAL PROTEIN: 7.1 G/DL (ref 6.4–8.3)
TRIGL SERPL-MCNC: 255 MG/DL (ref 0–249)
VLDLC SERPL CALC-MCNC: 51 MG/DL
WBC # BLD: 9 K/UL (ref 4–11)

## 2019-02-13 PROCEDURE — 85025 COMPLETE CBC W/AUTO DIFF WBC: CPT

## 2019-02-13 PROCEDURE — 80061 LIPID PANEL: CPT

## 2019-02-13 PROCEDURE — 83036 HEMOGLOBIN GLYCOSYLATED A1C: CPT

## 2019-02-13 PROCEDURE — 36415 COLL VENOUS BLD VENIPUNCTURE: CPT

## 2019-02-13 PROCEDURE — 80053 COMPREHEN METABOLIC PANEL: CPT

## 2019-02-20 ENCOUNTER — TELEPHONE (OUTPATIENT)
Dept: INTERNAL MEDICINE | Facility: CLINIC | Age: 44
End: 2019-02-20

## 2019-03-06 ENCOUNTER — HOSPITAL ENCOUNTER (OUTPATIENT)
Dept: CARDIOLOGY | Facility: HOSPITAL | Age: 44
Discharge: HOME | End: 2019-03-06
Admitting: INTERNAL MEDICINE

## 2019-03-06 DIAGNOSIS — I25.5 ISCHEMIC CARDIOMYOPATHY: ICD-10-CM

## 2019-03-06 PROCEDURE — 93308 TTE F-UP OR LMTD: CPT

## 2019-03-06 PROCEDURE — 93308 TTE F-UP OR LMTD: CPT | Performed by: INTERNAL MEDICINE

## 2019-03-06 PROCEDURE — 25010000002 SULFUR HEXAFLUORIDE MICROSPH 60.7-25 MG RECONSTITUTED SUSPENSION: Performed by: INTERNAL MEDICINE

## 2019-03-06 RX ADMIN — SULFUR HEXAFLUORIDE 2 ML: KIT at 16:35

## 2019-03-06 NOTE — PROGRESS NOTES
Jennifer Dey  1975  43 y.o.  [phone]      03/07/2019    PCP: Conner Kumar DO    Chief Complaint   Patient presents with   • Coronary Artery Disease       Problem List:  1. Coronary artery disease:  a. Acute STEMI, 12/03/2018.  b. LHC, 12/03/2018: Successful PTCA/stent placement ×2 in the proximal and mid LAD using overlapping 2.5 x 18 mm Yvrose AUBREY postdilated with a 3 mm noncompliant balloon. Noncritical disease in the circumflex and RCA. EF 25% and anterior akinesis with apical dyskinesis.  c. Echocardiogram, 12/05/2018: EF 20-25%. Trace-to-mild MR. Trace TR. No evidence of LV apical thrombus  d. LifeVest at discharge.  e. Echocardiogram, 03/06/2019: EF 30-35%. Anteroseptal akinesis.  2. Hyperlipidemia.  3. Tobacco abuse, 2 PPD now down to 4-5 cigs/d.  4. Low back pain.    No Known Allergies    Current Medications:      Current Outpatient Medications:   •  acetaminophen (TYLENOL) 500 MG tablet, Take 1,000 mg by mouth Every 6 (Six) Hours As Needed for Mild Pain ., Disp: , Rfl:   •  aspirin 81 MG chewable tablet, Chew 1 tablet Daily., Disp: 30 tablet, Rfl: 11  •  atorvastatin (LIPITOR) 40 MG tablet, Take 1 tablet by mouth Every Night., Disp: 30 tablet, Rfl: 11  •  betamethasone dipropionate (DIPROLENE) 0.05 % cream, Apply  topically to the appropriate area as directed 2 (Two) Times a Day., Disp: 15 g, Rfl: 2  •  buPROPion XL (WELLBUTRIN XL) 150 MG 24 hr tablet, Take 1 tablet by mouth Daily., Disp: 30 tablet, Rfl: 3  •  clopidogrel (PLAVIX) 75 MG tablet, Take 1 tablet by mouth Daily., Disp: 30 tablet, Rfl: 11  •  furosemide (LASIX) 40 MG tablet, Take 0.5 tablets by mouth Daily., Disp: 30 tablet, Rfl: 11  •  metoprolol tartrate (LOPRESSOR) 25 MG tablet, Take 0.5 tablets by mouth Every 12 (Twelve) Hours., Disp: 60 tablet, Rfl: 11  •  nitroglycerin (NITROSTAT) 0.4 MG SL tablet, Place 1 tablet under the tongue Every 5 (Five) Minutes As Needed for Chest Pain. Take no more than 3 doses in 15 minutes.,  "Disp: 25 tablet, Rfl: 12  •  sertraline (ZOLOFT) 25 MG tablet, Take 1 tablet by mouth Daily., Disp: 30 tablet, Rfl: 2  •  spironolactone (ALDACTONE) 25 MG tablet, Take 1 tablet by mouth Daily., Disp: 30 tablet, Rfl: 11  No current facility-administered medications for this visit.     HPI    Jennifer Dey is a 43 y.o. female who presents today for 6 week follow up of coronary artery disease and hyperlipidemia. Since last visit, she has not been experiencing shortness of breath or chest pain. She still has her LifeVest on at this time. She is doing some exercise but is not at 30 min 4-5 d/wk.  Patient denies chest pain, palpitations, shortness of breath, edema, dizziness, and syncope.     The following portions of the patient's history were reviewed and updated as appropriate: allergies, current medications and problem list.    Pertinent positives as listed in the HPI.  All other systems reviewed are negative.    Vitals:    03/07/19 0915   BP: 98/64   BP Location: Right arm   Patient Position: Sitting   Pulse: 78   Weight: 83.5 kg (184 lb)   Height: 157.5 cm (62\")       Physical Exam:    General: Alert and oriented.  Neck: Jugular venous pressure is within normal limits. Carotids have normal upstrokes without bruits.   Cardiovascular: Heart has a nondisplaced focal PMI. Regular rate and rhythm without murmur, gallop or rub.  Lungs: Clear without rales or wheezes. Equal expansion is noted.   Extremities: Show no edema.  Skin: Warm and dry.  Neurologic: Nonfocal.    Diagnostic Data:    Lab Results   Component Value Date    CHOL 180 02/13/2019    TRIG 255 (H) 02/13/2019    HDL 30 (L) 02/13/2019    LDL 99 02/13/2019        Procedures    Assessment:      ICD-10-CM ICD-9-CM   1. Coronary artery disease involving native coronary artery of native heart without angina pectoris I25.10 414.01   2. Ischemic cardiomyopathy I25.5 414.8   3. Hyperlipidemia LDL goal <70 E78.5 272.4       Plan:    1. Schedule ICD implantation for " low EF.  2. Continue aspirin 81 mg and Plavix 75 mg for daily anticoagulation with known CAD and stents s/p STEMI.  3. Continue metoprolol, Lasix, and spironolactone for hypertension and edema.  4. BP too low for ACEI/ARB  5. Continue atorvastatin 40 mg for hyperlipidemia.  6. Continue all other current medications.  7. F/up in 6 months or sooner if needed.    Scribed for Elen Boothe MD by Naty Taylor. 3/7/2019  9:43 AM     I Elen Boothe MD personally performed the services described in this documentation as scribed by the above individual in my presence, and it is both accurate and complete.    Elen Boothe MD, FACC

## 2019-03-07 ENCOUNTER — OFFICE VISIT (OUTPATIENT)
Dept: CARDIOLOGY | Facility: CLINIC | Age: 44
End: 2019-03-07

## 2019-03-07 VITALS
DIASTOLIC BLOOD PRESSURE: 64 MMHG | HEART RATE: 78 BPM | SYSTOLIC BLOOD PRESSURE: 98 MMHG | WEIGHT: 184 LBS | BODY MASS INDEX: 33.86 KG/M2 | HEIGHT: 62 IN

## 2019-03-07 DIAGNOSIS — I25.10 CORONARY ARTERY DISEASE INVOLVING NATIVE CORONARY ARTERY OF NATIVE HEART WITHOUT ANGINA PECTORIS: Primary | ICD-10-CM

## 2019-03-07 DIAGNOSIS — I25.5 ISCHEMIC CARDIOMYOPATHY: ICD-10-CM

## 2019-03-07 DIAGNOSIS — E78.5 HYPERLIPIDEMIA LDL GOAL <70: ICD-10-CM

## 2019-03-07 LAB
BH CV ECHO MEAS - AO ROOT AREA (BSA CORRECTED): 1.4
BH CV ECHO MEAS - AO ROOT AREA: 5.4 CM^2
BH CV ECHO MEAS - AO ROOT DIAM: 2.6 CM
BH CV ECHO MEAS - BSA(HAYCOCK): 1.9 M^2
BH CV ECHO MEAS - BSA: 1.8 M^2
BH CV ECHO MEAS - BZI_BMI: 33.5 KILOGRAMS/M^2
BH CV ECHO MEAS - BZI_METRIC_HEIGHT: 157.5 CM
BH CV ECHO MEAS - BZI_METRIC_WEIGHT: 83 KG
BH CV ECHO MEAS - EDV(CUBED): 87.7 ML
BH CV ECHO MEAS - EDV(MOD-BP): 147.7 ML
BH CV ECHO MEAS - EDV(MOD-SP2): 154.3 ML
BH CV ECHO MEAS - EDV(MOD-SP4): 134.2 ML
BH CV ECHO MEAS - EDV(SP2-EL): 156.6 ML
BH CV ECHO MEAS - EDV(SP4-EL): 136 ML
BH CV ECHO MEAS - EDV(TEICH): 89.7 ML
BH CV ECHO MEAS - EF(CUBED): 62 %
BH CV ECHO MEAS - EF(MOD-SP2): 29.2 %
BH CV ECHO MEAS - EF(MOD-SP4): 32.1 %
BH CV ECHO MEAS - EF(SP2-EL): 27.8 %
BH CV ECHO MEAS - EF(SP4-EL): 30.3 %
BH CV ECHO MEAS - EF(TEICH): 53.7 %
BH CV ECHO MEAS - ESV(CUBED): 33.4 ML
BH CV ECHO MEAS - ESV(MOD-BP): 102.6 ML
BH CV ECHO MEAS - ESV(MOD-SP2): 109.2 ML
BH CV ECHO MEAS - ESV(MOD-SP4): 91.2 ML
BH CV ECHO MEAS - ESV(SP2-EL): 113 ML
BH CV ECHO MEAS - ESV(SP4-EL): 94.8 ML
BH CV ECHO MEAS - ESV(TEICH): 41.6 ML
BH CV ECHO MEAS - FS: 27.6 %
BH CV ECHO MEAS - IVS/LVPW: 1.2
BH CV ECHO MEAS - IVSD: 1.2 CM
BH CV ECHO MEAS - LA DIMENSION: 3.4 CM
BH CV ECHO MEAS - LA/AO: 1.3
BH CV ECHO MEAS - LV DIASTOLIC VOL/BSA (35-75): 72.9 ML/M^2
BH CV ECHO MEAS - LV MASS(C)D: 170.9 GRAMS
BH CV ECHO MEAS - LV MASS(C)DI: 92.9 GRAMS/M^2
BH CV ECHO MEAS - LV SYSTOLIC VOL/BSA (12-30): 49.5 ML/M^2
BH CV ECHO MEAS - LVAD AP2: 39.3 CM^2
BH CV ECHO MEAS - LVAD AP4: 34.8 CM^2
BH CV ECHO MEAS - LVAS AP2: 32.3 CM^2
BH CV ECHO MEAS - LVAS AP4: 28.1 CM^2
BH CV ECHO MEAS - LVIDD: 4.4 CM
BH CV ECHO MEAS - LVIDS: 3.2 CM
BH CV ECHO MEAS - LVLD %DIFF: 9.5 %
BH CV ECHO MEAS - LVLD AP2: 8.1 CM
BH CV ECHO MEAS - LVLD AP4: 6.5 CM
BH CV ECHO MEAS - LVLS %DIFF: 10 %
BH CV ECHO MEAS - LVLS AP2: 7.6 CM
BH CV ECHO MEAS - LVLS AP4: 6.4 CM
BH CV ECHO MEAS - LVPWD: 1 CM
BH CV ECHO MEAS - SI(CUBED): 29.5 ML/M^2
BH CV ECHO MEAS - SI(MOD-BP): 24.5 ML/M^2
BH CV ECHO MEAS - SI(MOD-SP2): 24.5 ML/M^2
BH CV ECHO MEAS - SI(MOD-SP4): 23.4 ML/M^2
BH CV ECHO MEAS - SI(SP2-EL): 23.7 ML/M^2
BH CV ECHO MEAS - SI(SP4-EL): 22.4 ML/M^2
BH CV ECHO MEAS - SI(TEICH): 26.2 ML/M^2
BH CV ECHO MEAS - SV(CUBED): 54.4 ML
BH CV ECHO MEAS - SV(MOD-BP): 45.1 ML
BH CV ECHO MEAS - SV(MOD-SP2): 45.1 ML
BH CV ECHO MEAS - SV(MOD-SP4): 43 ML
BH CV ECHO MEAS - SV(SP2-EL): 43.6 ML
BH CV ECHO MEAS - SV(SP4-EL): 41.2 ML
BH CV ECHO MEAS - SV(TEICH): 48.2 ML
LV EF 2D ECHO EST: 30 %
MAXIMAL PREDICTED HEART RATE: 175 BPM
STRESS TARGET HR: 149 BPM

## 2019-03-07 PROCEDURE — 99214 OFFICE O/P EST MOD 30 MIN: CPT | Performed by: INTERNAL MEDICINE

## 2019-03-08 DIAGNOSIS — F41.9 ANXIETY: ICD-10-CM

## 2019-03-08 RX ORDER — SERTRALINE HYDROCHLORIDE 25 MG/1
TABLET, FILM COATED ORAL
Qty: 30 TABLET | Refills: 2 | Status: SHIPPED | OUTPATIENT
Start: 2019-03-08 | End: 2019-06-07 | Stop reason: SDUPTHER

## 2019-03-13 DIAGNOSIS — I25.5 ISCHEMIC CARDIOMYOPATHY: Primary | ICD-10-CM

## 2019-03-22 ENCOUNTER — TELEPHONE (OUTPATIENT)
Dept: CARDIOLOGY | Facility: CLINIC | Age: 44
End: 2019-03-22

## 2019-03-22 NOTE — TELEPHONE ENCOUNTER
Lm on vm- pt's daughter called to report that pt is experiencing Left Leg numbness- that is all the info they gave me- I called back but there was no answer- I left a msg on their VM advising them to go to the ER, as I am not sure what is causing the numbness

## 2019-04-02 ENCOUNTER — APPOINTMENT (OUTPATIENT)
Dept: PREADMISSION TESTING | Facility: HOSPITAL | Age: 44
End: 2019-04-02

## 2019-04-02 ENCOUNTER — CONSULT (OUTPATIENT)
Dept: CARDIOLOGY | Facility: CLINIC | Age: 44
End: 2019-04-02

## 2019-04-02 ENCOUNTER — PREP FOR SURGERY (OUTPATIENT)
Dept: OTHER | Facility: HOSPITAL | Age: 44
End: 2019-04-02

## 2019-04-02 VITALS
HEART RATE: 73 BPM | WEIGHT: 184 LBS | SYSTOLIC BLOOD PRESSURE: 90 MMHG | OXYGEN SATURATION: 97 % | DIASTOLIC BLOOD PRESSURE: 76 MMHG | BODY MASS INDEX: 34.74 KG/M2 | HEIGHT: 61 IN

## 2019-04-02 DIAGNOSIS — I50.22 CHRONIC SYSTOLIC (CONGESTIVE) HEART FAILURE (HCC): ICD-10-CM

## 2019-04-02 DIAGNOSIS — I50.22 CHRONIC SYSTOLIC HEART FAILURE (HCC): Primary | ICD-10-CM

## 2019-04-02 DIAGNOSIS — I50.22 CHRONIC SYSTOLIC (CONGESTIVE) HEART FAILURE (HCC): Primary | ICD-10-CM

## 2019-04-02 LAB
ALBUMIN SERPL-MCNC: 4.72 G/DL (ref 3.2–4.8)
ALBUMIN/GLOB SERPL: 2.2 G/DL (ref 1.5–2.5)
ALP SERPL-CCNC: 117 U/L (ref 25–100)
ALT SERPL W P-5'-P-CCNC: 29 U/L (ref 7–40)
ANION GAP SERPL CALCULATED.3IONS-SCNC: 10 MMOL/L (ref 3–11)
AST SERPL-CCNC: 26 U/L (ref 0–33)
BASOPHILS # BLD AUTO: 0.03 10*3/MM3 (ref 0–0.2)
BASOPHILS NFR BLD AUTO: 0.3 % (ref 0–1)
BILIRUB SERPL-MCNC: 0.3 MG/DL (ref 0.3–1.2)
BUN BLD-MCNC: 10 MG/DL (ref 9–23)
BUN/CREAT SERPL: 11.4 (ref 7–25)
CALCIUM SPEC-SCNC: 9.6 MG/DL (ref 8.7–10.4)
CHLORIDE SERPL-SCNC: 106 MMOL/L (ref 99–109)
CO2 SERPL-SCNC: 23 MMOL/L (ref 20–31)
CREAT BLD-MCNC: 0.88 MG/DL (ref 0.6–1.3)
DEPRECATED RDW RBC AUTO: 51.8 FL (ref 37–54)
EOSINOPHIL # BLD AUTO: 0.26 10*3/MM3 (ref 0–0.3)
EOSINOPHIL NFR BLD AUTO: 2.4 % (ref 0–3)
ERYTHROCYTE [DISTWIDTH] IN BLOOD BY AUTOMATED COUNT: 15.6 % (ref 11.3–14.5)
GFR SERPL CREATININE-BSD FRML MDRD: 69 ML/MIN/1.73
GLOBULIN UR ELPH-MCNC: 2.2 GM/DL
GLUCOSE BLD-MCNC: 121 MG/DL (ref 70–100)
HCT VFR BLD AUTO: 42.1 % (ref 34.5–44)
HGB BLD-MCNC: 13.6 G/DL (ref 11.5–15.5)
IMM GRANULOCYTES # BLD AUTO: 0.01 10*3/MM3 (ref 0–0.05)
IMM GRANULOCYTES NFR BLD AUTO: 0.1 % (ref 0–0.6)
LYMPHOCYTES # BLD AUTO: 2.82 10*3/MM3 (ref 0.6–4.8)
LYMPHOCYTES NFR BLD AUTO: 26 % (ref 24–44)
MCH RBC QN AUTO: 29.4 PG (ref 27–31)
MCHC RBC AUTO-ENTMCNC: 32.3 G/DL (ref 32–36)
MCV RBC AUTO: 90.9 FL (ref 80–99)
MONOCYTES # BLD AUTO: 0.56 10*3/MM3 (ref 0–1)
MONOCYTES NFR BLD AUTO: 5.2 % (ref 0–12)
NEUTROPHILS # BLD AUTO: 7.18 10*3/MM3 (ref 1.5–8.3)
NEUTROPHILS NFR BLD AUTO: 66.1 % (ref 41–71)
PLATELET # BLD AUTO: 334 10*3/MM3 (ref 150–450)
PMV BLD AUTO: 10.6 FL (ref 6–12)
POTASSIUM BLD-SCNC: 4.5 MMOL/L (ref 3.5–5.5)
PROT SERPL-MCNC: 6.9 G/DL (ref 5.7–8.2)
RBC # BLD AUTO: 4.63 10*6/MM3 (ref 3.89–5.14)
SODIUM BLD-SCNC: 139 MMOL/L (ref 132–146)
WBC NRBC COR # BLD: 10.85 10*3/MM3 (ref 3.5–10.8)

## 2019-04-02 PROCEDURE — 99244 OFF/OP CNSLTJ NEW/EST MOD 40: CPT | Performed by: INTERNAL MEDICINE

## 2019-04-02 PROCEDURE — 93000 ELECTROCARDIOGRAM COMPLETE: CPT | Performed by: INTERNAL MEDICINE

## 2019-04-02 PROCEDURE — 80053 COMPREHEN METABOLIC PANEL: CPT | Performed by: PHYSICIAN ASSISTANT

## 2019-04-02 PROCEDURE — 36415 COLL VENOUS BLD VENIPUNCTURE: CPT

## 2019-04-02 PROCEDURE — 85025 COMPLETE CBC W/AUTO DIFF WBC: CPT | Performed by: PHYSICIAN ASSISTANT

## 2019-04-02 RX ORDER — SODIUM CHLORIDE 0.9 % (FLUSH) 0.9 %
3-10 SYRINGE (ML) INJECTION AS NEEDED
Status: CANCELLED | OUTPATIENT
Start: 2019-04-02

## 2019-04-02 RX ORDER — CEFAZOLIN SODIUM 2 G/100ML
2 INJECTION, SOLUTION INTRAVENOUS ONCE
Status: CANCELLED | OUTPATIENT
Start: 2019-04-02

## 2019-04-02 RX ORDER — SODIUM CHLORIDE 0.9 % (FLUSH) 0.9 %
3 SYRINGE (ML) INJECTION EVERY 12 HOURS SCHEDULED
Status: CANCELLED | OUTPATIENT
Start: 2019-04-02

## 2019-04-02 NOTE — PROGRESS NOTES
Jennifer Dey  1975  PCP: Conner Kumar DO    SUBJECTIVE:   Jennifer Dey is a 43 y.o. female seen for a consultation visit regarding the following:     Chief Complaint:   Chief Complaint   Patient presents with   • Coronary Artery Disease   • Acute systolic heart failure (CMS/HCC)          Consultation is requested by Elen Boothe, * for evaluation of Coronary Artery Disease and Acute systolic heart failure (CMS/HCC)        History:  Patient is a 43 year old female who presents today in consultation regarding chronic systolic heart failure and need for possible ICD. She had an acute STEMI w/ PCI to LAD on 12/3/2018 and LVEF was 25% at that time. Echocardiogram on 12/5/18 revealed EF of 20-25%. She was discharged with a Lifevest and a beta blocker. Unable to tolerate ACE/ARB due to hypotension. She had a repeat echo on 3/6/19 and her LVEF was still depressed at 30-35%. She is fatigued most days and has LUA after walking a couple of hundred feet. She denies any chest pain, edema, orthopnea.       Cardiac PMH: (Old records have been reviewed and summarized below)    1. Coronary artery disease:  a. Acute STEMI, 12/03/2018.  b. C, 12/03/2018: Successful PTCA/stent placement ×2 in the proximal and mid LAD using overlapping 2.5 x 18 mm Yvrose AUBREY postdilated with a 3 mm noncompliant balloon. Noncritical disease in the circumflex and RCA. EF 25% and anterior akinesis with apical dyskinesis.  c. Echocardiogram, 12/05/2018: EF 20-25%. Trace-to-mild MR. Trace TR. No evidence of LV apical thrombus  d. LifeVest at discharge.  e. Echocardiogram, 03/06/2019: EF 30-35%. Anteroseptal akinesis.  2. Hyperlipidemia.  3. Tobacco abuse, 2 PPD now down to 4-5 cigs/d.  4. Low back pain.          Past Medical History, Past Surgical History, Family history, Social History, and Medications were all reviewed with the patient today and updated as necessary.       Current Outpatient Medications:   •  acetaminophen  (TYLENOL) 500 MG tablet, Take 1,000 mg by mouth Every 6 (Six) Hours As Needed for Mild Pain ., Disp: , Rfl:   •  aspirin 81 MG chewable tablet, Chew 1 tablet Daily., Disp: 30 tablet, Rfl: 11  •  atorvastatin (LIPITOR) 40 MG tablet, Take 1 tablet by mouth Every Night., Disp: 30 tablet, Rfl: 11  •  betamethasone dipropionate (DIPROLENE) 0.05 % cream, Apply  topically to the appropriate area as directed 2 (Two) Times a Day., Disp: 15 g, Rfl: 2  •  buPROPion XL (WELLBUTRIN XL) 150 MG 24 hr tablet, Take 1 tablet by mouth Daily., Disp: 30 tablet, Rfl: 3  •  clopidogrel (PLAVIX) 75 MG tablet, Take 1 tablet by mouth Daily., Disp: 30 tablet, Rfl: 11  •  furosemide (LASIX) 40 MG tablet, Take 0.5 tablets by mouth Daily., Disp: 30 tablet, Rfl: 11  •  metoprolol tartrate (LOPRESSOR) 25 MG tablet, Take 0.5 tablets by mouth Every 12 (Twelve) Hours., Disp: 60 tablet, Rfl: 11  •  nitroglycerin (NITROSTAT) 0.4 MG SL tablet, Place 1 tablet under the tongue Every 5 (Five) Minutes As Needed for Chest Pain. Take no more than 3 doses in 15 minutes., Disp: 25 tablet, Rfl: 12  •  sertraline (ZOLOFT) 25 MG tablet, take 1 tablet by mouth once daily, Disp: 30 tablet, Rfl: 2  •  spironolactone (ALDACTONE) 25 MG tablet, Take 1 tablet by mouth Daily., Disp: 30 tablet, Rfl: 11    No Known Allergies      Past Medical History:   Diagnosis Date   • CAD (coronary artery disease)    • Current smoker 12/3/2018   • Hypothyroidism not on replacement 12/03/2018   • Low back pain 12/3/2018   • MTHFR mutation (CMS/HCC) 12/03/2018   • SHANTA denies CPAP use 12/03/2018   • Stress incontinence 12/03/2018     Past Surgical History:   Procedure Laterality Date   • CARDIAC CATHETERIZATION N/A 12/3/2018    Procedure: LEFT HEART CATH;  Surgeon: Elen Boothe MD;  Location: [facility] INVASIVE LOCATION;  Service: Cardiology   • CYSTOSCOPY  2012   • TOTAL ABDOMINAL HYSTERECTOMY WITH SALPINGO OOPHORECTOMY  2014   • TUBAL ABDOMINAL LIGATION  1996   • WRIST  "FRACTURE SURGERY  2009     Family History   Problem Relation Age of Onset   • Heart disease Mother    • Breast cancer Mother    • Alzheimer's disease Mother    • Heart disease Father    • Heart disease Sister    • No Known Problems Brother    • Colon cancer Maternal Grandmother    • Heart disease Maternal Grandfather    • Heart disease Paternal Grandfather      Social History     Tobacco Use   • Smoking status: Former Smoker     Packs/day: 2.00     Years: 30.00     Pack years: 60.00     Last attempt to quit: 12/3/2018     Years since quittin.3   • Smokeless tobacco: Never Used   Substance Use Topics   • Alcohol use: No     Frequency: Never       ROS:  Review of Systems:  General: Increased wt gain and fatigue  Skin: no rashes, lumps, or other skin changes  HEENT: no dizziness, lightheadedness, or vision changes  Respiratory: no cough or hemoptysis  Cardiovascular: + SOB/LUA, increased swelling  Gastrointestinal: no black/tarry stools or diarrhea  Urinary: no change in frequency or urgency  Peripheral Vascular: no claudication or leg cramps  Musculoskeletal: no muscle or joint pain/stiffness  Psychiatric: no depression or excessive stress  Neurological: no sensory or motor loss, no syncope  Hematologic: no anemia, easy bruising or bleeding  Endocrine: no thyroid problems, nor heat or cold intolerance       PHYSICAL EXAM:   BP 90/76 (BP Location: Left arm, Patient Position: Sitting)   Pulse 73   Ht 154.9 cm (61\")   Wt 83.5 kg (184 lb)   SpO2 97%   BMI 34.77 kg/m²      Wt Readings from Last 5 Encounters:   19 83.5 kg (184 lb)   19 83.5 kg (184 lb)   19 83 kg (183 lb)   19 83.8 kg (184 lb 12.8 oz)   18 83.5 kg (184 lb)     BP Readings from Last 5 Encounters:   19 90/76   19 98/64   19 114/77   19 (!) 70/50   18 113/76       General-Well Nourished, Well developed  Eyes - PERRLA  Neck- supple, No mass  CV- regular rate and rhythm, no MRG  Lung- clear " bilaterally  Abd- soft, +BS  Musc/skel - Norm strength and range of motion  Skin- warm and dry  Neuro - Alert & Oriented x 3, appropriate mood.    Medical problems and test results were reviewed with the patient today.     Results for orders placed or performed during the hospital encounter of 03/06/19   Adult Transthoracic Echo Limited W/ Cont if Necessary Per Protocol   Result Value Ref Range    BSA 1.8 m^2    IVSd 1.2 cm    LVIDd 4.4 cm    LVIDs 3.2 cm    LVPWd 1.0 cm    IVS/LVPW 1.2     FS 27.6 %    EDV(Teich) 89.7 ml    ESV(Teich) 41.6 ml    EF(Teich) 53.7 %    EDV(cubed) 87.7 ml    ESV(cubed) 33.4 ml    EF(cubed) 62.0 %    LV mass(C)d 170.9 grams    LV mass(C)dI 92.9 grams/m^2    SV(Teich) 48.2 ml    SI(Teich) 26.2 ml/m^2    SV(cubed) 54.4 ml    SI(cubed) 29.5 ml/m^2    Ao root diam 2.6 cm    Ao root area 5.4 cm^2    LA dimension 3.4 cm    LA/Ao 1.3     LVAd ap4 34.8 cm^2    LVLd ap4 6.5 cm    EDV(MOD-sp4) 134.2 ml    EDV(sp4-el) 136.0 ml    LVAs ap4 28.1 cm^2    LVLs ap4 6.4 cm    ESV(MOD-sp4) 91.2 ml    ESV(sp4-el) 94.8 ml    EF(MOD-sp4) 32.1 %    EF(sp4-el) 30.3 %    LVAd ap2 39.3 cm^2    LVLd ap2 8.1 cm    EDV(MOD-sp2) 154.3 ml    EDV(sp2-el) 156.6 ml    LVAs ap2 32.3 cm^2    LVLs ap2 7.6 cm    ESV(MOD-sp2) 109.2 ml    ESV(sp2-el) 113.0 ml    EF(MOD-sp2) 29.2 %    EF(sp2-el) 27.8 %    BH CV ECHO AMANDA - LVLD %DIFF 9.5 %    EDV(MOD-bp) 147.7 ml    BH CV ECHO AMANDA - LVLS %DIFF 10.0 %    ESV(MOD-bp) 102.6 ml    SV(MOD-sp4) 43.0 ml    SI(MOD-sp4) 23.4 ml/m^2    SV(MOD-sp2) 45.1 ml    SI(MOD-sp2) 24.5 ml/m^2    SV(MOD-bp) 45.1 ml    SI(MOD-bp) 24.5 ml/m^2    SV(sp4-el) 41.2 ml    SI(sp4-el) 22.4 ml/m^2    SV(sp2-el) 43.6 ml    SI(sp2-el) 23.7 ml/m^2    Ao root area (BSA corrected) 1.4     LV [place]hawa Vol (BSA corrected) 72.9 ml/m^2    LV Sys Vol (BSA corrected) 49.5 ml/m^2     CV ECHO AMANDA - BZI_BMI 33.5 kilograms/m^2     CV ECHO AMANDA - BSA(HAYCOCK) 1.9 m^2     CV ECHO AMANDA - BZI_METRIC_WEIGHT 83.0 kg     CV  ECHO AMANDA - BZI_METRIC_HEIGHT 157.5 cm    Echo EF Estimated 30 %    Target HR (85%) 149 bpm    Max. Pred. HR (100%) 175 bpm         Lab Results   Component Value Date    CHOL 254 (H) 12/04/2018    HDL 30 (L) 12/04/2018     (H) 12/04/2018       EKG:  (EKG/Tracing has been independently visualized by me and summarized below)      ECG 12 Lead  Date/Time: 4/2/2019 8:51 AM  Performed by: Bigg Hernandez MD  Authorized by: Bigg Hernandez MD   Rhythm: sinus rhythm  Rate: normal  BPM: 74  Conduction: conduction normal  QRS axis: left  Other findings: non-specific ST-T wave changes    Clinical impression: abnormal EKG            ASSESSMENT and PLAN    1. Chronic systolic heart failure-ischemic in nature. LVEF initially 20-25% 12/2018. Now s/p revascularization and treatment with beta blocker (unable to tolerate ACE/ARB) for greater than 90 days. Patient with FC II heart failure symptoms. Repeat echo showed LVEF of 30-35%. Will plan for Biotronik VDI ICD.     2. CAD s/p STEMI with PCI to LAD. Continue DAPT, beta blocker, and statin     Return for after procedure.      Scribed for Bigg Hernandez MD by Johanna Alvarez PA-C. 4/2/2019  9:05 AM      I have personally performed a face to face diagnostic evaluation on this patient.  I have reviewed and agree with the care plan.  History and Exam by me shows: 42 yo with CHF secondary to MI. EF remains <35%. Will plan for an VDI ICD. We discussed the procedure in great detail including risks, benefits, and alternatives. The patient understands that risks include bleeding, infection, stroke, MI, cardiac perforation, and even death.    Bigg Hernandez M.D., F.A.C.C, F.H.R.S.  Cardiology/Electrophysiology  04/02/19  9:05 AM

## 2019-04-02 NOTE — PROGRESS NOTES
Pt is being referred to for an Implantable Cardioverter- Defibrillator (ICD).    The PA has discussed and allowed the pt to ask questions regarding ICD.  Pt was provided with a Shared Decision ICD booklet by PA.    Electronically signed by LARISSA Walsh, 04/02/19, 9:03 AM.

## 2019-04-02 NOTE — DISCHARGE INSTRUCTIONS

## 2019-04-02 NOTE — PAT
Patient to apply Chlorhexadine wipes  to surgical area (as instructed) the night before procedure and the AM of procedure. Wipes provided.      .

## 2019-04-08 ENCOUNTER — HOSPITAL ENCOUNTER (OUTPATIENT)
Facility: HOSPITAL | Age: 44
Discharge: HOME | End: 2019-04-09
Attending: INTERNAL MEDICINE | Admitting: INTERNAL MEDICINE

## 2019-04-08 DIAGNOSIS — I50.22 CHRONIC SYSTOLIC HEART FAILURE (HCC): ICD-10-CM

## 2019-04-08 LAB — B-HCG UR QL: NEGATIVE

## 2019-04-08 PROCEDURE — 33249 INSJ/RPLCMT DEFIB W/LEAD(S): CPT | Performed by: INTERNAL MEDICINE

## 2019-04-08 PROCEDURE — 94770: CPT

## 2019-04-08 PROCEDURE — 99153 MOD SED SAME PHYS/QHP EA: CPT | Performed by: INTERNAL MEDICINE

## 2019-04-08 PROCEDURE — C1892 INTRO/SHEATH,FIXED,PEEL-AWAY: HCPCS | Performed by: INTERNAL MEDICINE

## 2019-04-08 PROCEDURE — 25010000003 CEFAZOLIN IN DEXTROSE 2-4 GM/100ML-% SOLUTION: Performed by: INTERNAL MEDICINE

## 2019-04-08 PROCEDURE — 99152 MOD SED SAME PHYS/QHP 5/>YRS: CPT | Performed by: INTERNAL MEDICINE

## 2019-04-08 PROCEDURE — S0260 H&P FOR SURGERY: HCPCS | Performed by: INTERNAL MEDICINE

## 2019-04-08 PROCEDURE — 93641 EP EVL 1/2CHMB PAC CVDFB TST: CPT | Performed by: INTERNAL MEDICINE

## 2019-04-08 PROCEDURE — 25010000002 MIDAZOLAM PER 1 MG: Performed by: INTERNAL MEDICINE

## 2019-04-08 PROCEDURE — 81025 URINE PREGNANCY TEST: CPT | Performed by: INTERNAL MEDICINE

## 2019-04-08 PROCEDURE — 25010000002 FENTANYL CITRATE (PF) 100 MCG/2ML SOLUTION: Performed by: INTERNAL MEDICINE

## 2019-04-08 PROCEDURE — C1777 LEAD, AICD, ENDO SINGLE COIL: HCPCS | Performed by: INTERNAL MEDICINE

## 2019-04-08 PROCEDURE — 25010000002 ONDANSETRON PER 1 MG: Performed by: INTERNAL MEDICINE

## 2019-04-08 PROCEDURE — 25010000003 CEFAZOLIN IN DEXTROSE 2-4 GM/100ML-% SOLUTION: Performed by: PHYSICIAN ASSISTANT

## 2019-04-08 PROCEDURE — C1722 AICD, SINGLE CHAMBER: HCPCS | Performed by: INTERNAL MEDICINE

## 2019-04-08 DEVICE — IMPLANTABLE DEVICE
Type: IMPLANTABLE DEVICE | Status: FUNCTIONAL
Brand: INTICA 7 VR-T DX

## 2019-04-08 DEVICE — IMPLANTABLE DEVICE: Type: IMPLANTABLE DEVICE | Status: FUNCTIONAL

## 2019-04-08 RX ORDER — CLOPIDOGREL BISULFATE 75 MG/1
75 TABLET ORAL DAILY
Status: DISCONTINUED | OUTPATIENT
Start: 2019-04-09 | End: 2019-04-09 | Stop reason: HOSPADM

## 2019-04-08 RX ORDER — SODIUM CHLORIDE 0.9 % (FLUSH) 0.9 %
1-10 SYRINGE (ML) INJECTION AS NEEDED
Status: DISCONTINUED | OUTPATIENT
Start: 2019-04-08 | End: 2019-04-09 | Stop reason: HOSPADM

## 2019-04-08 RX ORDER — ACETAMINOPHEN 325 MG/1
650 TABLET ORAL EVERY 4 HOURS PRN
Status: DISCONTINUED | OUTPATIENT
Start: 2019-04-08 | End: 2019-04-09 | Stop reason: HOSPADM

## 2019-04-08 RX ORDER — SERTRALINE HYDROCHLORIDE 25 MG/1
25 TABLET, FILM COATED ORAL DAILY
Status: DISCONTINUED | OUTPATIENT
Start: 2019-04-09 | End: 2019-04-09 | Stop reason: HOSPADM

## 2019-04-08 RX ORDER — ACETAMINOPHEN 650 MG/1
650 SUPPOSITORY RECTAL EVERY 4 HOURS PRN
Status: DISCONTINUED | OUTPATIENT
Start: 2019-04-08 | End: 2019-04-09 | Stop reason: HOSPADM

## 2019-04-08 RX ORDER — LISINOPRIL 5 MG/1
5 TABLET ORAL DAILY
Status: DISCONTINUED | OUTPATIENT
Start: 2019-04-08 | End: 2019-04-09 | Stop reason: HOSPADM

## 2019-04-08 RX ORDER — BUPIVACAINE HYDROCHLORIDE 5 MG/ML
INJECTION, SOLUTION EPIDURAL; INTRACAUDAL AS NEEDED
Status: DISCONTINUED | OUTPATIENT
Start: 2019-04-08 | End: 2019-04-08 | Stop reason: HOSPADM

## 2019-04-08 RX ORDER — FENTANYL CITRATE 50 UG/ML
INJECTION, SOLUTION INTRAMUSCULAR; INTRAVENOUS AS NEEDED
Status: DISCONTINUED | OUTPATIENT
Start: 2019-04-08 | End: 2019-04-08 | Stop reason: HOSPADM

## 2019-04-08 RX ORDER — CEFAZOLIN SODIUM 2 G/100ML
2 INJECTION, SOLUTION INTRAVENOUS EVERY 8 HOURS
Status: COMPLETED | OUTPATIENT
Start: 2019-04-08 | End: 2019-04-09

## 2019-04-08 RX ORDER — NITROGLYCERIN 0.4 MG/1
0.4 TABLET SUBLINGUAL
Status: DISCONTINUED | OUTPATIENT
Start: 2019-04-08 | End: 2019-04-09 | Stop reason: HOSPADM

## 2019-04-08 RX ORDER — CEFAZOLIN SODIUM 2 G/100ML
2 INJECTION, SOLUTION INTRAVENOUS ONCE
Status: COMPLETED | OUTPATIENT
Start: 2019-04-08 | End: 2019-04-08

## 2019-04-08 RX ORDER — NICOTINE 21 MG/24HR
1 PATCH, TRANSDERMAL 24 HOURS TRANSDERMAL
Status: DISCONTINUED | OUTPATIENT
Start: 2019-04-08 | End: 2019-04-09 | Stop reason: HOSPADM

## 2019-04-08 RX ORDER — TEMAZEPAM 15 MG/1
15 CAPSULE ORAL NIGHTLY PRN
Status: DISCONTINUED | OUTPATIENT
Start: 2019-04-08 | End: 2019-04-09 | Stop reason: HOSPADM

## 2019-04-08 RX ORDER — LIDOCAINE HYDROCHLORIDE AND EPINEPHRINE 10; 10 MG/ML; UG/ML
INJECTION, SOLUTION INFILTRATION; PERINEURAL AS NEEDED
Status: DISCONTINUED | OUTPATIENT
Start: 2019-04-08 | End: 2019-04-08 | Stop reason: HOSPADM

## 2019-04-08 RX ORDER — SODIUM CHLORIDE 0.9 % (FLUSH) 0.9 %
3-10 SYRINGE (ML) INJECTION AS NEEDED
Status: DISCONTINUED | OUTPATIENT
Start: 2019-04-08 | End: 2019-04-08 | Stop reason: HOSPADM

## 2019-04-08 RX ORDER — FUROSEMIDE 20 MG/1
20 TABLET ORAL DAILY
Status: DISCONTINUED | OUTPATIENT
Start: 2019-04-09 | End: 2019-04-09 | Stop reason: HOSPADM

## 2019-04-08 RX ORDER — MIDAZOLAM HYDROCHLORIDE 1 MG/ML
INJECTION INTRAMUSCULAR; INTRAVENOUS AS NEEDED
Status: DISCONTINUED | OUTPATIENT
Start: 2019-04-08 | End: 2019-04-08 | Stop reason: HOSPADM

## 2019-04-08 RX ORDER — SPIRONOLACTONE 25 MG/1
25 TABLET ORAL DAILY
Status: DISCONTINUED | OUTPATIENT
Start: 2019-04-09 | End: 2019-04-09 | Stop reason: HOSPADM

## 2019-04-08 RX ORDER — BUPROPION HYDROCHLORIDE 150 MG/1
150 TABLET ORAL DAILY
Status: DISCONTINUED | OUTPATIENT
Start: 2019-04-09 | End: 2019-04-09 | Stop reason: HOSPADM

## 2019-04-08 RX ORDER — SODIUM CHLORIDE 9 MG/ML
INJECTION, SOLUTION INTRAVENOUS CONTINUOUS PRN
Status: COMPLETED | OUTPATIENT
Start: 2019-04-08 | End: 2019-04-08

## 2019-04-08 RX ORDER — ACETAMINOPHEN 500 MG
1000 TABLET ORAL EVERY 6 HOURS PRN
Status: DISCONTINUED | OUTPATIENT
Start: 2019-04-08 | End: 2019-04-09 | Stop reason: HOSPADM

## 2019-04-08 RX ORDER — ASPIRIN 81 MG/1
81 TABLET, CHEWABLE ORAL DAILY
Status: DISCONTINUED | OUTPATIENT
Start: 2019-04-09 | End: 2019-04-09

## 2019-04-08 RX ORDER — SODIUM CHLORIDE 0.9 % (FLUSH) 0.9 %
3 SYRINGE (ML) INJECTION EVERY 12 HOURS SCHEDULED
Status: DISCONTINUED | OUTPATIENT
Start: 2019-04-08 | End: 2019-04-08 | Stop reason: HOSPADM

## 2019-04-08 RX ORDER — SODIUM CHLORIDE 0.9 % (FLUSH) 0.9 %
3 SYRINGE (ML) INJECTION EVERY 12 HOURS SCHEDULED
Status: DISCONTINUED | OUTPATIENT
Start: 2019-04-08 | End: 2019-04-09 | Stop reason: HOSPADM

## 2019-04-08 RX ORDER — ONDANSETRON 2 MG/ML
4 INJECTION INTRAMUSCULAR; INTRAVENOUS EVERY 6 HOURS PRN
Status: DISCONTINUED | OUTPATIENT
Start: 2019-04-08 | End: 2019-04-09 | Stop reason: HOSPADM

## 2019-04-08 RX ORDER — ACETAMINOPHEN 160 MG/5ML
650 SOLUTION ORAL EVERY 4 HOURS PRN
Status: DISCONTINUED | OUTPATIENT
Start: 2019-04-08 | End: 2019-04-09 | Stop reason: HOSPADM

## 2019-04-08 RX ORDER — OXYCODONE HYDROCHLORIDE AND ACETAMINOPHEN 5; 325 MG/1; MG/1
1 TABLET ORAL EVERY 4 HOURS PRN
Status: DISCONTINUED | OUTPATIENT
Start: 2019-04-08 | End: 2019-04-09 | Stop reason: HOSPADM

## 2019-04-08 RX ORDER — ATORVASTATIN CALCIUM 40 MG/1
40 TABLET, FILM COATED ORAL NIGHTLY
Status: DISCONTINUED | OUTPATIENT
Start: 2019-04-08 | End: 2019-04-09 | Stop reason: HOSPADM

## 2019-04-08 RX ADMIN — CEFAZOLIN SODIUM 2 G: 2 INJECTION, SOLUTION INTRAVENOUS at 13:35

## 2019-04-08 RX ADMIN — LISINOPRIL 5 MG: 5 TABLET ORAL at 15:43

## 2019-04-08 RX ADMIN — NICOTINE 1 PATCH: 14 PATCH, EXTENDED RELEASE TRANSDERMAL at 16:47

## 2019-04-08 RX ADMIN — OXYCODONE AND ACETAMINOPHEN 1 TABLET: 5; 325 TABLET ORAL at 16:47

## 2019-04-08 RX ADMIN — CEFAZOLIN SODIUM 2 G: 2 INJECTION, SOLUTION INTRAVENOUS at 21:50

## 2019-04-08 RX ADMIN — METOPROLOL TARTRATE 12.5 MG: 25 TABLET ORAL at 21:51

## 2019-04-08 RX ADMIN — ONDANSETRON 4 MG: 2 INJECTION INTRAMUSCULAR; INTRAVENOUS at 21:50

## 2019-04-08 RX ADMIN — OXYCODONE AND ACETAMINOPHEN 1 TABLET: 5; 325 TABLET ORAL at 21:50

## 2019-04-08 RX ADMIN — SODIUM CHLORIDE, PRESERVATIVE FREE 3 ML: 5 INJECTION INTRAVENOUS at 21:51

## 2019-04-08 RX ADMIN — ATORVASTATIN CALCIUM 40 MG: 40 TABLET, FILM COATED ORAL at 21:50

## 2019-04-08 NOTE — H&P
Jennifer Dey  1975  PCP: Conner Kumar DO    SUBJECTIVE:   Jennifer Dey is a 43 y.o. female seen for a consultation visit regarding the following:     Chief Complaint:   No chief complaint on file.         Consultation is requested by Bigg Hernandez MD for evaluation of No chief complaint on file.        History:  Patient is a 43 year old female who presents today in consultation regarding chronic systolic heart failure and need for possible ICD. She had an acute STEMI w/ PCI to LAD on 12/3/2018 and LVEF was 25% at that time. Echocardiogram on 12/5/18 revealed EF of 20-25%. She was discharged with a Lifevest and a beta blocker. Unable to tolerate ACE/ARB due to hypotension. She had a repeat echo on 3/6/19 and her LVEF was still depressed at 30-35%. She is fatigued most days and has LUA after walking a couple of hundred feet. She denies any chest pain, edema, orthopnea.       Cardiac PMH: (Old records have been reviewed and summarized below)    1. Coronary artery disease:  a. Acute STEMI, 12/03/2018.  b. [facility], 12/03/2018: Successful PTCA/stent placement ×2 in the proximal and mid LAD using overlapping 2.5 x 18 mm Yvrose AUBREY postdilated with a 3 mm noncompliant balloon. Noncritical disease in the circumflex and RCA. EF 25% and anterior akinesis with apical dyskinesis.  c. Echocardiogram, 12/05/2018: EF 20-25%. Trace-to-mild MR. Trace TR. No evidence of LV apical thrombus  d. LifeVest at discharge.  e. Echocardiogram, 03/06/2019: EF 30-35%. Anteroseptal akinesis.  2. Hyperlipidemia.  3. Tobacco abuse, 2 PPD now down to 4-5 cigs/d.  4. Low back pain.          Past Medical History, Past Surgical History, Family history, Social History, and Medications were all reviewed with the patient today and updated as necessary.       Current Facility-Administered Medications:   •  ceFAZolin in dextrose (ANCEF) IVPB solution 2 g, 2 g, Intravenous, Once, Johanna Alvarez PA  •  sodium chloride 0.9 %  flush 3 mL, 3 mL, Intravenous, Q12H, Johanna Alvarez PA  •  sodium chloride 0.9 % flush 3-10 mL, 3-10 mL, Intravenous, PRN, Johanna Alvarez PA    No Known Allergies      Past Medical History:   Diagnosis Date   • Anxiety    • CAD (coronary artery disease)    • Current smoker 12/3/2018   • Elevated cholesterol    • Hyperlipidemia    • Hypertension    • Hypothyroidism not on replacement 2018   • Low back pain 12/3/2018   • MTHFR mutation (CMS/[facility]) 2018   • SHANTA denies CPAP use 2018   • STEMI (ST elevation myocardial infarction) (CMS/[facility])    • Stress incontinence 2018     Past Surgical History:   Procedure Laterality Date   • CARDIAC CATHETERIZATION N/A 12/3/2018    Procedure: LEFT HEART CATH;  Surgeon: Elen Boothe MD;  Location: [facility] INVASIVE LOCATION;  Service: Cardiology   • CORONARY STENT PLACEMENT      two stents   • CYSTOSCOPY     • TUBAL ABDOMINAL LIGATION     • WISDOM TOOTH EXTRACTION     • WRIST FRACTURE SURGERY       Family History   Problem Relation Age of Onset   • Heart disease Mother    • Breast cancer Mother    • Alzheimer's disease Mother    • Heart disease Father    • Heart disease Sister    • No Known Problems Brother    • Colon cancer Maternal Grandmother    • Heart disease Maternal Grandfather    • Heart disease Paternal Grandfather      Social History     Tobacco Use   • Smoking status: Current Every Day Smoker     Packs/day: 0.50     Years: 30.00     Pack years: 15.00     Types: Cigarettes     Last attempt to quit: 12/3/2018     Years since quittin.3   • Smokeless tobacco: Never Used   Substance Use Topics   • Alcohol use: No     Frequency: Never       ROS:  Review of Systems:  General: Increased wt gain and fatigue  Skin: no rashes, lumps, or other skin changes  HEENT: no dizziness, lightheadedness, or vision changes  Respiratory: no cough or hemoptysis  Cardiovascular: + SOB/LUA, increased swelling  Gastrointestinal: no black/tarry stools  "or diarrhea  Urinary: no change in frequency or urgency  Peripheral Vascular: no claudication or leg cramps  Musculoskeletal: no muscle or joint pain/stiffness  Psychiatric: no depression or excessive stress  Neurological: no sensory or motor loss, no syncope  Hematologic: no anemia, easy bruising or bleeding  Endocrine: no thyroid problems, nor heat or cold intolerance       PHYSICAL EXAM:   /79 (BP Location: Left arm, Patient Position: Lying)   Pulse 74   Temp 97.9 °F (36.6 °C) (Temporal)   Ht 152.4 cm (60\")   Wt 83.1 kg (183 lb 3.2 oz)   SpO2 98%   BMI 35.78 kg/m²      Wt Readings from Last 5 Encounters:   04/08/19 83.1 kg (183 lb 3.2 oz)   04/02/19 83.5 kg (184 lb)   03/07/19 83.5 kg (184 lb)   01/23/19 83 kg (183 lb)   01/17/19 83.8 kg (184 lb 12.8 oz)     BP Readings from Last 5 Encounters:   04/08/19 107/79   04/02/19 90/76   03/07/19 98/64   01/23/19 114/77   01/17/19 (!) 70/50       General-Well Nourished, Well developed  Eyes - PERRLA  Neck- supple, No mass  CV- regular rate and rhythm, no MRG  Lung- clear bilaterally  Abd- soft, +BS  Musc/skel - Norm strength and range of motion  Skin- warm and dry  Neuro - Alert & Oriented x 3, appropriate mood.    Medical problems and test results were reviewed with the patient today.     Results for orders placed or performed during the hospital encounter of 04/08/19   Pregnancy, Urine - Urine, Clean Catch   Result Value Ref Range    HCG, Urine QL Negative Negative         Lab Results   Component Value Date    CHOL 254 (H) 12/04/2018    HDL 30 (L) 12/04/2018     (H) 12/04/2018       EKG:  (EKG/Tracing has been independently visualized by me and summarized below)    Procedures    ASSESSMENT and PLAN    1. Chronic systolic heart failure-ischemic in nature. LVEF initially 20-25% 12/2018. Now s/p revascularization and treatment with beta blocker (unable to tolerate ACE/ARB) for greater than 90 days. Patient with FC II heart failure symptoms. Repeat echo " showed LVEF of 30-35%. Will plan for Biotronik VDI ICD.     2. CAD s/p STEMI with PCI to LAD. Continue DAPT, beta blocker, and statin       Bigg Hernandez M.D., [email or web address].[email or web address].C, F.H.R.S.  Cardiology/Electrophysiology  04/08/19  11:56 AM

## 2019-04-08 NOTE — PROCEDURES
PRE-ELECTROPHYSIOLOGY STUDY DIAGNOSES  1. Chronic systolic heart failure, ejection fraction of 30%  2. CAD with ischemic dilated cardiomyopathy. MI >90 prior to implant  3. Class II heart failure symptoms.  4. Chronic systolic heart failure for greater than 3 months duration.  5. Primary prevention indications for defibrillator  6. No hospitalizations for congestive heart failure.  7. Life expectancy greater than 1 year.  8. On Guideline directed medical therapy maximum dose for 3 months prior to implant. Unable to tolerate ACEI/ARB secondary to hypotension        PROCEDURE PERFORMED  1. Insertion of Biotronik VDI implantable cardioverter defibrillator.  2. Testing of implantable cardioverter defibrillator.  3. Sedation    Anesthesia: Cath lab moderate sedation    I was present with the patient for the duration of moderate sedation and supervised staff who had no other duties and monitored the patient for the entire procedure     Name of independent trained observer: Rosario Cooley RN  Intra-Service start time: 1337  Intra-Service end time: 1420     Estimated Blood Loss: Less than 10 mL     Specimens: None      PROCEDURE IN DETAIL: The patient was brought into the EP lab in a fasting  state. The left shoulder was prepped and draped in the usual sterile  fashion. Skin anesthetized with lidocaine with epinephrine. Incision was  made in the region of the deltopectoral groove. Pocket was made for the   ICD. Access was obtained in the left subclavian vein via  the Seldinger technique over which a guidewire was placed.  Over the first guidewire, an 8-Czech sheath was placed. Through this  8-Czech sheath, a Biotronik ICD lead, model Plexa ProMRI DX 65/15 was placed  at the RV apex, we achieved the following values: R waves were 15.4mV,  threshold was 1.0 at 0.4 msec pulse width. This lead was then secured  to the pectoral fascia with 0 Ti-Cron x2.  We achieved the following values: P waves were 12.1mV. Pocket was  irrigated with triple antibiotic flush. The leads were connected to a Biotronik VDI ICD, model Intica 7 VR-T DX, serial #[id number]. The leads  and ICD were then placed in the pocket area. Defibrillator  threshold testing was performed and the patient was placed into  ventricular fibrillation, was converted out with 25 joules of energy.  Pocket was then closed with 2-0 Vicryl, followed by a next layer of 3-0  Vicryl, followed by a superficial layer of staples. The wound was  dressed. The patient was recovered from his sedation, transferred from  the lab in a stable condition.    IMPRESSION: Successful implantation of Biotronik VDI  implantable cardioverter defibrillator for primary prevention of sudden death.

## 2019-04-09 ENCOUNTER — APPOINTMENT (OUTPATIENT)
Dept: GENERAL RADIOLOGY | Facility: HOSPITAL | Age: 44
End: 2019-04-09

## 2019-04-09 VITALS
DIASTOLIC BLOOD PRESSURE: 58 MMHG | OXYGEN SATURATION: 97 % | HEIGHT: 60 IN | TEMPERATURE: 98.1 F | BODY MASS INDEX: 35.97 KG/M2 | HEART RATE: 76 BPM | RESPIRATION RATE: 16 BRPM | SYSTOLIC BLOOD PRESSURE: 102 MMHG | WEIGHT: 183.2 LBS

## 2019-04-09 PROCEDURE — 93010 ELECTROCARDIOGRAM REPORT: CPT | Performed by: INTERNAL MEDICINE

## 2019-04-09 PROCEDURE — 93282 PRGRMG EVAL IMPLANTABLE DFB: CPT | Performed by: INTERNAL MEDICINE

## 2019-04-09 PROCEDURE — 25010000003 CEFAZOLIN IN DEXTROSE 2-4 GM/100ML-% SOLUTION: Performed by: INTERNAL MEDICINE

## 2019-04-09 PROCEDURE — 71046 X-RAY EXAM CHEST 2 VIEWS: CPT

## 2019-04-09 PROCEDURE — 93005 ELECTROCARDIOGRAM TRACING: CPT | Performed by: INTERNAL MEDICINE

## 2019-04-09 PROCEDURE — 25010000002 ONDANSETRON PER 1 MG: Performed by: INTERNAL MEDICINE

## 2019-04-09 RX ORDER — OXYCODONE HYDROCHLORIDE AND ACETAMINOPHEN 5; 325 MG/1; MG/1
1 TABLET ORAL EVERY 6 HOURS PRN
Qty: 5 TABLET | Refills: 0 | Status: SHIPPED | OUTPATIENT
Start: 2019-04-09 | End: 2019-07-30

## 2019-04-09 RX ORDER — LISINOPRIL 5 MG/1
5 TABLET ORAL DAILY
Qty: 30 TABLET | Refills: 11 | Status: SHIPPED | OUTPATIENT
Start: 2019-04-09 | End: 2021-01-28 | Stop reason: SDUPTHER

## 2019-04-09 RX ORDER — ASPIRIN 81 MG/1
81 TABLET, CHEWABLE ORAL DAILY
Status: DISCONTINUED | OUTPATIENT
Start: 2019-04-09 | End: 2019-04-09 | Stop reason: HOSPADM

## 2019-04-09 RX ORDER — CEPHALEXIN 500 MG/1
500 CAPSULE ORAL 3 TIMES DAILY
Qty: 9 CAPSULE | Refills: 0 | Status: SHIPPED | OUTPATIENT
Start: 2019-04-09 | End: 2019-04-12

## 2019-04-09 RX ADMIN — OXYCODONE AND ACETAMINOPHEN 1 TABLET: 5; 325 TABLET ORAL at 06:29

## 2019-04-09 RX ADMIN — SODIUM CHLORIDE, PRESERVATIVE FREE 3 ML: 5 INJECTION INTRAVENOUS at 08:06

## 2019-04-09 RX ADMIN — FUROSEMIDE 20 MG: 20 TABLET ORAL at 07:53

## 2019-04-09 RX ADMIN — CLOPIDOGREL BISULFATE 75 MG: 75 TABLET ORAL at 08:06

## 2019-04-09 RX ADMIN — SPIRONOLACTONE 25 MG: 25 TABLET ORAL at 07:53

## 2019-04-09 RX ADMIN — LISINOPRIL 5 MG: 5 TABLET ORAL at 07:53

## 2019-04-09 RX ADMIN — OXYCODONE AND ACETAMINOPHEN 1 TABLET: 5; 325 TABLET ORAL at 02:12

## 2019-04-09 RX ADMIN — METOPROLOL TARTRATE 12.5 MG: 25 TABLET ORAL at 07:52

## 2019-04-09 RX ADMIN — BUPROPION HYDROCHLORIDE 150 MG: 150 TABLET, FILM COATED, EXTENDED RELEASE ORAL at 07:52

## 2019-04-09 RX ADMIN — ASPIRIN 81 MG 81 MG: 81 TABLET ORAL at 07:53

## 2019-04-09 RX ADMIN — ONDANSETRON 4 MG: 2 INJECTION INTRAMUSCULAR; INTRAVENOUS at 06:29

## 2019-04-09 RX ADMIN — SERTRALINE HYDROCHLORIDE 25 MG: 25 TABLET ORAL at 07:53

## 2019-04-09 RX ADMIN — NICOTINE 1 PATCH: 14 PATCH, EXTENDED RELEASE TRANSDERMAL at 08:03

## 2019-04-09 RX ADMIN — CEFAZOLIN SODIUM 2 G: 2 INJECTION, SOLUTION INTRAVENOUS at 06:20

## 2019-04-09 NOTE — PLAN OF CARE
Problem: Patient Care Overview  Goal: Plan of Care Review  Outcome: Ongoing (interventions implemented as appropriate)   04/09/19 5215   Coping/Psychosocial   Plan of Care Reviewed With patient   Plan of Care Review   Progress improving   OTHER   Outcome Summary VSS. Pt having post-op pain at the incision site. Ice packs have been applied in addition to po meds. Will continue to monitor.

## 2019-04-09 NOTE — DISCHARGE SUMMARY
Physician Discharge Summary     Patient ID:  Jennifer Dey  [id number]  43 y.o.  1975    Admit date: 4/8/2019    Discharge date and time: No discharge date for patient encounter.     Admitting Physician: Bigg Hernandez MD     Primary Physician: Conner Kumar DO    Discharge Physician: Bigg Hernadnez MD    Admission Diagnoses: Chronic systolic heart failure (CMS/HCC) [I50.22]  Chronic systolic heart failure (CMS/HCC) [I50.22]    Discharge Diagnoses:   Patient Active Problem List    Diagnosis   • *Chronic systolic heart failure (CMS/HCC) [I50.22]   • Ischemic cardiomyopathy [I25.5]   • Coronary artery disease involving native coronary artery of native heart without angina pectoris [I25.10]   • Acute systolic heart failure (CMS/HCC) [I50.21]   • Hyperlipidemia LDL goal <70 [E78.5]   • Anterior STEMI S/P stent X2 LAD per Dr. Boothe on 12/3 [I21.02]   • Current smoker [F17.200]   • Bilateral pulmonary nodules [R91.8]   • Class 2 obesity in adult [E66.9]   • STEMI involving left anterior descending coronary artery (CMS/[facility]) [I21.02]   • Stress incontinence [N39.3]   • Acute respiratory failure with hypoxia (CMS/[facility]) [J96.01]       Cardiology Procedures this admission:    1. Biotronik VDI ICD    Hospital Course: Patient had cardiac device placement. Post device CXR and device check were stable. Please see operation report for full implant details.    Discharge Exam:    Vitals:    04/09/19 0645   BP: 104/71   Pulse: 77   Resp:    Temp:    SpO2: 97%      General-Well Nourished, Well developed  Eyes - PERRLA  Neck- supple, No mass  CV- regular rate and rhythm, no MRG, No edema  Lung- clear bilaterally  Abd- soft, +BS  Musc/skel - Norm strength and range of motion  Skin- warm and dry  Neuro - Alert & Oriented x 3, appropriate mood.    Disposition: Patient will be discharged home    Patient discharge medications:      Your medication list      START taking these medications      Instructions Last  Dose Given Next Dose Due   cephalexin 500 MG capsule  Commonly known as:  KEFLEX      Take 1 capsule by mouth 3 (Three) Times a Day for 3 days.       lisinopril 5 MG tablet  Commonly known as:  PRINIVIL,ZESTRIL      Take 1 tablet by mouth Daily.       oxyCODONE-acetaminophen 5-325 MG per tablet  Commonly known as:  PERCOCET      Take 1 tablet by mouth Every 6 (Six) Hours As Needed for Moderate Pain .          CHANGE how you take these medications      Instructions Last Dose Given Next Dose Due   betamethasone dipropionate 0.05 % cream  Commonly known as:  DIPROLENE  What changed:  how much to take      Apply  topically to the appropriate area as directed 2 (Two) Times a Day.          CONTINUE taking these medications      Instructions Last Dose Given Next Dose Due   acetaminophen 500 MG tablet  Commonly known as:  TYLENOL      Take 1,000 mg by mouth Every 6 (Six) Hours As Needed for Mild Pain .       aspirin 81 MG chewable tablet      Chew 1 tablet Daily.       atorvastatin 40 MG tablet  Commonly known as:  LIPITOR      Take 1 tablet by mouth Every Night.       buPROPion  MG 24 hr tablet  Commonly known as:  WELLBUTRIN XL      Take 1 tablet by mouth Daily.       clopidogrel 75 MG tablet  Commonly known as:  PLAVIX      Take 1 tablet by mouth Daily.       furosemide 40 MG tablet  Commonly known as:  LASIX      Take 0.5 tablets by mouth Daily.       metoprolol tartrate 25 MG tablet  Commonly known as:  LOPRESSOR      Take 0.5 tablets by mouth Every 12 (Twelve) Hours.       nitroglycerin 0.4 MG SL tablet  Commonly known as:  NITROSTAT      Place 1 tablet under the tongue Every 5 (Five) Minutes As Needed for Chest Pain. Take no more than 3 doses in 15 minutes.       sertraline 25 MG tablet  Commonly known as:  ZOLOFT      take 1 tablet by mouth once daily       spironolactone 25 MG tablet  Commonly known as:  ALDACTONE      Take 1 tablet by mouth Daily.             Where to Get Your Medications      You can get  these medications from any pharmacy    Bring a paper prescription for each of these medications  · cephalexin 500 MG capsule  · lisinopril 5 MG tablet  · oxyCODONE-acetaminophen 5-325 MG per tablet         Referenced discharge instructions provided by nursing for diet and activity.    Follow-up with Pacemaker/ICD Clinic in 12 to 14 days    Signed:  Bigg Hernandez MD  4/9/2019  7:40 AM

## 2019-04-10 ENCOUNTER — CLINICAL SUPPORT NO REQUIREMENTS (OUTPATIENT)
Dept: CARDIOLOGY | Facility: CLINIC | Age: 44
End: 2019-04-10

## 2019-04-10 ENCOUNTER — TELEPHONE (OUTPATIENT)
Dept: CARDIOLOGY | Facility: CLINIC | Age: 44
End: 2019-04-10

## 2019-04-10 DIAGNOSIS — I50.41 ACUTE COMBINED SYSTOLIC AND DIASTOLIC CONGESTIVE HEART FAILURE (HCC): Primary | ICD-10-CM

## 2019-04-10 NOTE — TELEPHONE ENCOUNTER
"PT's daughter is calling. Her mom is in \"excuriating pain\"   She has taken all of her prescription for oxycodone-acetaminophen 5/325mg 1 q6 prn since yesterday. Daughter wants to know what to do and if she needs to take her mother to the ER.  "

## 2019-04-11 NOTE — TELEPHONE ENCOUNTER
Called and pt is aware that she has a prescription here at the office to be picked up. Copy of Rx in media.

## 2019-04-23 ENCOUNTER — OFFICE VISIT (OUTPATIENT)
Dept: CARDIOLOGY | Facility: CLINIC | Age: 44
End: 2019-04-23

## 2019-04-23 DIAGNOSIS — I25.5 ISCHEMIC CARDIOMYOPATHY: Primary | ICD-10-CM

## 2019-04-23 PROCEDURE — 93283 PRGRMG EVAL IMPLANTABLE DFB: CPT | Performed by: INTERNAL MEDICINE

## 2019-04-23 PROCEDURE — 99024 POSTOP FOLLOW-UP VISIT: CPT | Performed by: INTERNAL MEDICINE

## 2019-04-23 NOTE — PROGRESS NOTES
2019    Jennifer Jeremy Dey, : 1975    WOUND CHECK      Patient has fever: [] YES   [x] NO     Temperature if indicated:       Wound Location:  Left shoulder      Dressing was:  Removed       Old Dressing Appearance:  Clean, dry        Wound Appearance:  Incision well-approximated with no signs or symptoms of infection        Gloves used, staples removed without diffuculty, wound cleansed with alcohol       Incision dresssed with triple antibiotic ointment, 4x4, and tegaderm with patient to remove in 3 days.  Verbal understanding from patient       Device was: Interrogated - Please see separate report        Plan:  Normal wound check      Appointment for follow-up scheduled for 3 months post procedure [x]    Future Appointments   Date Time Provider Department Center   2019  2:15 PM Conner Kumar DO [facility] None   2019 11:00 AM Bigg Hernandez MD [facility] None   10/3/2019  3:45 PM Elen Boothe MD [facility] None            DONALD Burnett, 19

## 2019-05-27 DIAGNOSIS — Z72.0 TOBACCO ABUSE: ICD-10-CM

## 2019-05-28 RX ORDER — BUPROPION HYDROCHLORIDE 150 MG/1
TABLET ORAL
Qty: 30 TABLET | Refills: 0 | Status: SHIPPED | OUTPATIENT
Start: 2019-05-28 | End: 2019-06-07 | Stop reason: SDUPTHER

## 2019-06-07 DIAGNOSIS — Z72.0 TOBACCO ABUSE: ICD-10-CM

## 2019-06-07 DIAGNOSIS — F41.9 ANXIETY: ICD-10-CM

## 2019-06-10 RX ORDER — BUPROPION HYDROCHLORIDE 150 MG/1
150 TABLET ORAL DAILY
Qty: 30 TABLET | Refills: 0 | Status: SHIPPED | OUTPATIENT
Start: 2019-06-10 | End: 2020-07-29

## 2019-06-10 RX ORDER — SERTRALINE HYDROCHLORIDE 25 MG/1
25 TABLET, FILM COATED ORAL DAILY
Qty: 30 TABLET | Refills: 2 | Status: SHIPPED | OUTPATIENT
Start: 2019-06-10 | End: 2019-09-10 | Stop reason: SDUPTHER

## 2019-07-30 ENCOUNTER — OFFICE VISIT (OUTPATIENT)
Dept: CARDIOLOGY | Facility: CLINIC | Age: 44
End: 2019-07-30

## 2019-07-30 VITALS
DIASTOLIC BLOOD PRESSURE: 62 MMHG | SYSTOLIC BLOOD PRESSURE: 124 MMHG | BODY MASS INDEX: 33.64 KG/M2 | HEIGHT: 61 IN | HEART RATE: 78 BPM | WEIGHT: 178.2 LBS | OXYGEN SATURATION: 97 %

## 2019-07-30 DIAGNOSIS — Z95.810 ICD (IMPLANTABLE CARDIOVERTER-DEFIBRILLATOR) IN PLACE: ICD-10-CM

## 2019-07-30 DIAGNOSIS — I50.22 CHRONIC SYSTOLIC HEART FAILURE (HCC): Primary | ICD-10-CM

## 2019-07-30 PROCEDURE — 99213 OFFICE O/P EST LOW 20 MIN: CPT | Performed by: INTERNAL MEDICINE

## 2019-07-30 PROCEDURE — 93282 PRGRMG EVAL IMPLANTABLE DFB: CPT | Performed by: INTERNAL MEDICINE

## 2019-07-30 NOTE — PROGRESS NOTES
Jennifer Dey  1975  PCP: Conner Kumar DO    SUBJECTIVE:   Jennifer Dey is a 43 y.o. female seen for a consultation visit regarding the following:     Chief Complaint:   Chief Complaint   Patient presents with   • Coronary Artery Disease        HPI:  Patient has been cardiac stable. No CP or SOB. No events on ICD    History:  Patient is a 43 year old female who presents today in consultation regarding chronic systolic heart failure and need for possible ICD. She had an acute STEMI w/ PCI to LAD on 12/3/2018 and LVEF was 25% at that time. Echocardiogram on 12/5/18 revealed EF of 20-25%. She was discharged with a Lifevest and a beta blocker. Unable to tolerate ACE/ARB due to hypotension. She had a repeat echo on 3/6/19 and her LVEF was still depressed at 30-35%. She is fatigued most days and has LUA after walking a couple of hundred feet. She denies any chest pain, edema, orthopnea.       Cardiac PMH: (Old records have been reviewed and summarized below)    1. Coronary artery disease:  a. Acute STEMI, 12/03/2018.  b. [facility], 12/03/2018: Successful PTCA/stent placement ×2 in the proximal and mid LAD using overlapping 2.5 x 18 mm Yvrose AUBREY postdilated with a 3 mm noncompliant balloon. Noncritical disease in the circumflex and RCA. EF 25% and anterior akinesis with apical dyskinesis.  c. Echocardiogram, 12/05/2018: EF 20-25%. Trace-to-mild MR. Trace TR. No evidence of LV apical thrombus  d. LifeVest at discharge.  e. Echocardiogram, 03/06/2019: EF 30-35%. Anteroseptal akinesis.  2. Hyperlipidemia.  3. Tobacco abuse, 2 PPD now down to 4-5 cigs/d.  4. Low back pain.          Past Medical History, Past Surgical History, Family history, Social History, and Medications were all reviewed with the patient today and updated as necessary.       Current Outpatient Medications:   •  acetaminophen (TYLENOL) 500 MG tablet, Take 1,000 mg by mouth Every 6 (Six) Hours As Needed for Mild Pain ., Disp: , Rfl:    •  aspirin 81 MG chewable tablet, Chew 1 tablet Daily., Disp: 30 tablet, Rfl: 11  •  atorvastatin (LIPITOR) 40 MG tablet, Take 1 tablet by mouth Every Night., Disp: 30 tablet, Rfl: 11  •  betamethasone dipropionate (DIPROLENE) 0.05 % cream, Apply  topically to the appropriate area as directed 2 (Two) Times a Day. (Patient taking differently: Apply 1 application topically to the appropriate area as directed 2 (Two) Times a Day.), Disp: 15 g, Rfl: 2  •  buPROPion XL (WELLBUTRIN XL) 150 MG 24 hr tablet, Take 1 tablet by mouth Daily., Disp: 30 tablet, Rfl: 0  •  clopidogrel (PLAVIX) 75 MG tablet, Take 1 tablet by mouth Daily., Disp: 30 tablet, Rfl: 11  •  furosemide (LASIX) 40 MG tablet, Take 0.5 tablets by mouth Daily., Disp: 30 tablet, Rfl: 11  •  lisinopril (PRINIVIL,ZESTRIL) 5 MG tablet, Take 1 tablet by mouth Daily., Disp: 30 tablet, Rfl: 11  •  metoprolol tartrate (LOPRESSOR) 25 MG tablet, Take 0.5 tablets by mouth Every 12 (Twelve) Hours., Disp: 60 tablet, Rfl: 11  •  nitroglycerin (NITROSTAT) 0.4 MG SL tablet, Place 1 tablet under the tongue Every 5 (Five) Minutes As Needed for Chest Pain. Take no more than 3 doses in 15 minutes., Disp: 25 tablet, Rfl: 12  •  sertraline (ZOLOFT) 25 MG tablet, Take 1 tablet by mouth Daily., Disp: 30 tablet, Rfl: 2  •  spironolactone (ALDACTONE) 25 MG tablet, Take 1 tablet by mouth Daily., Disp: 30 tablet, Rfl: 11    No Known Allergies      Past Medical History:   Diagnosis Date   • Anxiety    • CAD (coronary artery disease)    • Current smoker 12/3/2018   • Elevated cholesterol    • Hyperlipidemia    • Hypertension    • Hypothyroidism not on replacement 12/03/2018   • Low back pain 12/3/2018   • MTHFR mutation (CMS/HCC) 12/03/2018   • SHANTA denies CPAP use 12/03/2018   • STEMI (ST elevation myocardial infarction) (CMS/HCC)    • Stress incontinence 12/03/2018     Past Surgical History:   Procedure Laterality Date   • CARDIAC CATHETERIZATION N/A 12/3/2018    Procedure: LEFT HEART  "CATH;  Surgeon: Elen Boothe MD;  Location:  [facility] CATH [facility]ASIVE [facility];  Service: Cardiology   • CARDIAC ELECTROPHYSIOLOGY PROCEDURE N/A 2019    Procedure: Device Implant- single chamber ICD;  Surgeon: Bigg Hernandez MD;  Location:  [facility] EP INVASIVE LOCATION;  Service: Cardiology   • CORONARY STENT PLACEMENT      two stents   • CYSTOSCOPY     • TUBAL ABDOMINAL LIGATION     • WISDOM TOOTH EXTRACTION     • WRIST FRACTURE SURGERY       Family History   Problem Relation Age of Onset   • Heart disease Mother    • Breast cancer Mother    • Alzheimer's disease Mother    • Heart disease Father    • Heart disease Sister    • No Known Problems Brother    • Colon cancer Maternal Grandmother    • Heart disease Maternal Grandfather    • Heart disease Paternal Grandfather      Social History     Tobacco Use   • Smoking status: Current Every Day Smoker     Packs/day: 0.50     Years: 30.00     Pack years: 15.00     Types: Cigarettes     Last attempt to quit: 12/3/2018     Years since quittin.6   • Smokeless tobacco: Never Used   Substance Use Topics   • Alcohol use: No     Frequency: Never          PHYSICAL EXAM:   /62 (BP Location: Right arm, Patient Position: Sitting)   Pulse 78   Ht 154.9 cm (61\")   Wt 80.8 kg (178 lb 3.2 oz)   SpO2 97%   BMI 33.67 kg/m²      Wt Readings from Last 5 Encounters:   19 80.8 kg (178 lb 3.2 oz)   19 83.1 kg (183 lb 3.2 oz)   19 83.5 kg (184 lb)   19 83.5 kg (184 lb)   19 83 kg (183 lb)     BP Readings from Last 5 Encounters:   19 124/62   19 102/58   19 90/76   19 98/64   19 114/77       General-Well Nourished, Well developed  Eyes - PERRLA  Neck- supple, No mass  CV- regular rate and rhythm, no MRG  Lung- clear bilaterally  Abd- soft, +BS  Musc/skel - Norm strength and range of motion  Skin- warm and dry  Neuro - Alert & Oriented x 3, appropriate mood.    Medical problems and test results " were reviewed with the patient today.     Results for orders placed or performed during the hospital encounter of 04/08/19   Pregnancy, Urine - Urine, Clean Catch   Result Value Ref Range    HCG, Urine QL Negative Negative         Lab Results   Component Value Date    CHOL 254 (H) 12/04/2018    HDL 30 (L) 12/04/2018     (H) 12/04/2018       EKG:  (EKG/Tracing has been independently visualized by me and summarized below)    Procedures    ASSESSMENT and PLAN    1. Chronic systolic heart failure-ischemic in nature. LVEF initially 20-25% 12/2018. Now s/p revascularization and treatment with beta blocker (unable to tolerate ACE/ARB) for greater than 90 days. Patient with FC II heart failure symptoms. Repeat echo showed LVEF of 30-35%. Post Biotronik VDI ICD.     2. CAD s/p STEMI with PCI to LAD. Continue DAPT, beta blocker, and statin     3. Tobacco use - Trying to quit.     4. ICD - Stable function.     Return in about 6 months (around 1/30/2020) for 6 months with Biotronik.      Bigg Hernandez M.D., F.A.C.C, F.H.R.S.  Cardiology/Electrophysiology  07/30/19  11:14 AM

## 2019-08-27 ENCOUNTER — CLINICAL SUPPORT NO REQUIREMENTS (OUTPATIENT)
Dept: CARDIOLOGY | Facility: CLINIC | Age: 44
End: 2019-08-27

## 2019-08-27 DIAGNOSIS — I50.22 CHRONIC SYSTOLIC HEART FAILURE (HCC): ICD-10-CM

## 2019-08-27 PROCEDURE — 93296 REM INTERROG EVL PM/IDS: CPT | Performed by: INTERNAL MEDICINE

## 2019-08-27 PROCEDURE — 93295 DEV INTERROG REMOTE 1/2/MLT: CPT | Performed by: INTERNAL MEDICINE

## 2019-09-10 DIAGNOSIS — F41.9 ANXIETY: ICD-10-CM

## 2019-09-10 RX ORDER — SERTRALINE HYDROCHLORIDE 25 MG/1
TABLET, FILM COATED ORAL
Qty: 90 TABLET | Refills: 0 | Status: SHIPPED | OUTPATIENT
Start: 2019-09-10 | End: 2019-12-12 | Stop reason: SDUPTHER

## 2019-10-03 ENCOUNTER — OFFICE VISIT (OUTPATIENT)
Dept: CARDIOLOGY | Facility: CLINIC | Age: 44
End: 2019-10-03

## 2019-10-03 VITALS
DIASTOLIC BLOOD PRESSURE: 68 MMHG | SYSTOLIC BLOOD PRESSURE: 100 MMHG | BODY MASS INDEX: 33.61 KG/M2 | RESPIRATION RATE: 18 BRPM | HEIGHT: 60 IN | HEART RATE: 86 BPM | WEIGHT: 171.2 LBS

## 2019-10-03 DIAGNOSIS — Z95.810 ICD (IMPLANTABLE CARDIOVERTER-DEFIBRILLATOR) IN PLACE: ICD-10-CM

## 2019-10-03 DIAGNOSIS — E78.5 HYPERLIPIDEMIA LDL GOAL <70: ICD-10-CM

## 2019-10-03 DIAGNOSIS — I50.22 CHRONIC SYSTOLIC HEART FAILURE (HCC): Primary | ICD-10-CM

## 2019-10-03 DIAGNOSIS — I25.10 CORONARY ARTERY DISEASE INVOLVING NATIVE CORONARY ARTERY OF NATIVE HEART WITHOUT ANGINA PECTORIS: ICD-10-CM

## 2019-10-03 PROCEDURE — 99213 OFFICE O/P EST LOW 20 MIN: CPT | Performed by: NURSE PRACTITIONER

## 2019-10-03 RX ORDER — ATORVASTATIN CALCIUM 80 MG/1
80 TABLET, FILM COATED ORAL NIGHTLY
Qty: 90 TABLET | Refills: 11 | Status: SHIPPED | OUTPATIENT
Start: 2019-10-03 | End: 2020-07-16

## 2019-10-03 NOTE — PROGRESS NOTES
[facility]  Jennifer Luna Tiburcio  1975  44 y.o.  [phone]  Mobile phone not available      Date: 10/03/2019    PCP: Conner Kumar DO    Chief Complaint   Patient presents with   • Follow-up       Problem List:  1. Coronary artery disease:  a. Acute STEMI, 12/03/2018.  b. [facility], 12/03/2018: Successful PTCA/stent placement ×2 in the proximal and mid LAD using overlapping 2.5 x 18 mm Yvrose AUBREY postdilated with a 3 mm noncompliant balloon. Noncritical disease in the circumflex and RCA. EF 25% and anterior akinesis with apical dyskinesis.  2. Ischemic cardiomyopathy/Chronic systolic heart failure:  a. Echocardiogram, 12/05/2018: EF 20-25%. Trace-to-mild MR. Trace TR. No evidence of LV apical thrombus  b. LifeVest at discharge.  c. Echocardiogram, 03/06/2019: EF 30-35%. Anteroseptal akinesis.  d. ICD placement, 04/08/2019, Dr. Hernandez: Insertion of Palringo VDI implantable cardioverter defibrillator for primary prevention of sudden death.  3. Hyperlipidemia.  4. Tobacco abuse, 2 PPD now down to 4-5 cigs/d.  5. Low back pain.    No Known Allergies    Current Medications:      Current Outpatient Medications:   •  acetaminophen (TYLENOL) 500 MG tablet, Take 1,000 mg by mouth Every 6 (Six) Hours As Needed for Mild Pain ., Disp: , Rfl:   •  aspirin 81 MG chewable tablet, Chew 1 tablet Daily., Disp: 30 tablet, Rfl: 11  •  betamethasone dipropionate (DIPROLENE) 0.05 % cream, Apply  topically to the appropriate area as directed 2 (Two) Times a Day. (Patient taking differently: Apply 1 application topically to the appropriate area as directed 2 (Two) Times a Day.), Disp: 15 g, Rfl: 2  •  clopidogrel (PLAVIX) 75 MG tablet, Take 1 tablet by mouth Daily., Disp: 30 tablet, Rfl: 11  •  furosemide (LASIX) 40 MG tablet, Take 0.5 tablets by mouth Daily., Disp: 30 tablet, Rfl: 11  •  lisinopril (PRINIVIL,ZESTRIL) 5 MG tablet, Take 1 tablet by mouth Daily., Disp: 30 tablet, Rfl:  "11  •  metoprolol tartrate (LOPRESSOR) 25 MG tablet, Take 0.5 tablets by mouth Every 12 (Twelve) Hours., Disp: 60 tablet, Rfl: 11  •  nitroglycerin (NITROSTAT) 0.4 MG SL tablet, Place 1 tablet under the tongue Every 5 (Five) Minutes As Needed for Chest Pain. Take no more than 3 doses in 15 minutes., Disp: 25 tablet, Rfl: 12  •  sertraline (ZOLOFT) 25 MG tablet, take 1 tablet by mouth once daily, Disp: 90 tablet, Rfl: 0  •  spironolactone (ALDACTONE) 25 MG tablet, Take 1 tablet by mouth Daily., Disp: 30 tablet, Rfl: 11  •  atorvastatin (LIPITOR) 80 MG tablet, Take 1 tablet by mouth Every Night., Disp: 90 tablet, Rfl: 11  •  buPROPion XL (WELLBUTRIN XL) 150 MG 24 hr tablet, Take 1 tablet by mouth Daily., Disp: 30 tablet, Rfl: 0    HPI    Jennifer Dey is a 44 y.o. female who presents today for 6 month follow up of coronary artery disease, ischemic cardiomyopathy, hypertension, and hyperlipidemia. She received an ICD for primary prevention of sudden death in April 2019. Since then, she has done well from a cardiovascular standpoint. She is on optimal medical therapy with lisinopril, metoprolol, aldactone, stat and ASA. FLP is sub optimal. Will increase Lipitor to 80 mg daily. Patient denies chest pain, palpitations, shortness of breath, edema, dizziness, and syncope. No PND, orthopnea.    The following portions of the patient's history were reviewed and updated as appropriate: allergies, current medications and problem list.    Pertinent positives as listed in the HPI.  All other systems reviewed are negative.    Vitals:    10/03/19 1106   BP: 100/68   BP Location: Right arm   Patient Position: Sitting   Pulse: 86   Resp: 18   Weight: 77.7 kg (171 lb 3.2 oz)   Height: 152.4 cm (60\")       Physical Exam:    General: Alert and oriented.  Neck: Jugular venous pressure is within normal limits. Carotids have normal upstrokes without bruits.   Cardiovascular: Heart has a nondisplaced focal PMI. Regular rate and " rhythm without murmur, gallop or rub.  Lungs: Clear without rales or wheezes. Equal expansion is noted.   Extremities: Show no edema.  Skin: Warm and dry.  Neurologic: Nonfocal.    Diagnostic Data:    Last lipid panel, 02/13/2019:      HDL 30  LDL 99    Lab Results   Component Value Date    GLUCOSE 121 (H) 04/02/2019    BUN 10 04/02/2019    CREATININE 0.88 04/02/2019    EGFRIFNONA 69 04/02/2019    BCR 11.4 04/02/2019     04/02/2019    K 4.5 04/02/2019     04/02/2019    CO2 23.0 04/02/2019    CALCIUM 9.6 04/02/2019    ALBUMIN 4.72 04/02/2019    AST 26 04/02/2019    ALT 29 04/02/2019     Lab Results   Component Value Date    WBC 10.85 (H) 04/02/2019    HGB 13.6 04/02/2019    HCT 42.1 04/02/2019    MCV 90.9 04/02/2019     04/02/2019     Lab Results   Component Value Date    TSH 0.831 12/04/2018       Procedures    Assessment:      ICD-10-CM ICD-9-CM   1. Chronic systolic heart failure (CMS/HCC), NYHA class I, compensated.  I50.22 428.22   2. Coronary artery disease involving native coronary artery of native heart without angina pectoris, stable.  I25.10 414.01   3. ICD (implantable cardioverter-defibrillator) in place Z95.810 V45.02   4. Hyperlipidemia LDL goal <70, FLP reviewed from 2/2019. LDL is suboptimal.  E78.5 272.4     Lab results found above were reviewed with the patient.    Plan:    1. Increase Lipitor to 80 mg daily. Repeat FLP at f/u in 8 months.   2. Keep scheduled f/u with Dr. Moon.   3. Continue ACE, BB, aldactone for ICMP.   4. Continue BB, statin, ASA for CAD.   5. Continue all other current medications.  6. F/up in 8 months or sooner if needed.      Electronically signed by PATRICIA Hodgson, 10/03/19, 11:17 AM.

## 2019-10-10 ENCOUNTER — TELEPHONE (OUTPATIENT)
Dept: CARDIOLOGY | Facility: CLINIC | Age: 44
End: 2019-10-10

## 2019-10-10 NOTE — TELEPHONE ENCOUNTER
CLAIRE on VM for pt's daughter-    Daughter calls stating that since increasing Atorvastatin to 80 mgdaily, PT has been experiencing muscle cramps/pain in her shoulders and up her neck-    I left a msg on daughter's VM with the instructions to decrease Atorvastatin back down to 40 mg for 7-10 days to see if symptoms resolve- if they do resolved we will discuss alternate therapy with Dr. Boothe- requested that they update me in 7-10 days

## 2019-11-01 RX ORDER — NITROGLYCERIN 0.4 MG/1
TABLET SUBLINGUAL
Qty: 100 TABLET | Refills: 2 | Status: SHIPPED | OUTPATIENT
Start: 2019-11-01 | End: 2020-06-26 | Stop reason: SDUPTHER

## 2019-11-26 ENCOUNTER — CLINICAL SUPPORT NO REQUIREMENTS (OUTPATIENT)
Dept: CARDIOLOGY | Facility: CLINIC | Age: 44
End: 2019-11-26

## 2019-11-26 DIAGNOSIS — F41.9 ANXIETY: ICD-10-CM

## 2019-11-26 DIAGNOSIS — I50.22 CHRONIC SYSTOLIC HEART FAILURE (HCC): ICD-10-CM

## 2019-11-26 PROCEDURE — 93295 DEV INTERROG REMOTE 1/2/MLT: CPT | Performed by: INTERNAL MEDICINE

## 2019-11-26 PROCEDURE — 93296 REM INTERROG EVL PM/IDS: CPT | Performed by: INTERNAL MEDICINE

## 2019-11-26 NOTE — TELEPHONE ENCOUNTER
Pt hasn't been seen since 1/23/19, was advised to f/u in a month. Pt has no showed 3 appts since then and has cancelled 1. I wasn't sure what Dr. Kumar would want to do.

## 2019-11-27 RX ORDER — SERTRALINE HYDROCHLORIDE 25 MG/1
TABLET, FILM COATED ORAL
Qty: 90 TABLET | Refills: 0 | OUTPATIENT
Start: 2019-11-27

## 2019-12-12 DIAGNOSIS — F41.9 ANXIETY: ICD-10-CM

## 2019-12-12 RX ORDER — SERTRALINE HYDROCHLORIDE 25 MG/1
TABLET, FILM COATED ORAL
Qty: 90 TABLET | Refills: 0 | Status: SHIPPED | OUTPATIENT
Start: 2019-12-12 | End: 2020-02-10

## 2019-12-30 RX ORDER — FUROSEMIDE 40 MG/1
TABLET ORAL
Qty: 30 TABLET | Refills: 11 | OUTPATIENT
Start: 2019-12-30

## 2019-12-30 RX ORDER — FUROSEMIDE 20 MG/1
20 TABLET ORAL DAILY
Qty: 30 TABLET | Refills: 11 | Status: SHIPPED | OUTPATIENT
Start: 2019-12-30 | End: 2021-02-02 | Stop reason: SDUPTHER

## 2019-12-30 RX ORDER — SPIRONOLACTONE 25 MG/1
TABLET ORAL
Qty: 90 TABLET | Refills: 1 | Status: SHIPPED | OUTPATIENT
Start: 2019-12-30 | End: 2020-08-03

## 2019-12-30 RX ORDER — CLOPIDOGREL BISULFATE 75 MG/1
TABLET ORAL
Qty: 90 TABLET | Refills: 1 | Status: SHIPPED | OUTPATIENT
Start: 2019-12-30 | End: 2020-02-28

## 2019-12-30 RX ORDER — ASPIRIN 81 MG/1
TABLET, CHEWABLE ORAL
Qty: 90 TABLET | Refills: 3 | Status: SHIPPED | OUTPATIENT
Start: 2019-12-30 | End: 2021-02-02 | Stop reason: SDUPTHER

## 2020-02-09 DIAGNOSIS — F41.9 ANXIETY: ICD-10-CM

## 2020-02-10 RX ORDER — SERTRALINE HYDROCHLORIDE 25 MG/1
25 TABLET, FILM COATED ORAL DAILY
Qty: 30 TABLET | Refills: 0 | Status: SHIPPED | OUTPATIENT
Start: 2020-02-10 | End: 2020-07-29

## 2020-02-28 RX ORDER — CLOPIDOGREL BISULFATE 75 MG/1
TABLET ORAL
Qty: 90 TABLET | Refills: 1 | Status: SHIPPED | OUTPATIENT
Start: 2020-02-28 | End: 2021-04-27

## 2020-03-15 DIAGNOSIS — F41.9 ANXIETY: ICD-10-CM

## 2020-03-16 RX ORDER — SERTRALINE HYDROCHLORIDE 25 MG/1
TABLET, FILM COATED ORAL
Qty: 30 TABLET | Refills: 0 | OUTPATIENT
Start: 2020-03-16

## 2020-03-25 ENCOUNTER — TELEPHONE (OUTPATIENT)
Dept: CARDIOLOGY | Facility: CLINIC | Age: 45
End: 2020-03-25

## 2020-03-25 ENCOUNTER — CLINICAL SUPPORT NO REQUIREMENTS (OUTPATIENT)
Dept: CARDIOLOGY | Facility: CLINIC | Age: 45
End: 2020-03-25

## 2020-03-25 DIAGNOSIS — I50.22 CHRONIC SYSTOLIC HEART FAILURE (HCC): ICD-10-CM

## 2020-03-25 DIAGNOSIS — I25.5 ISCHEMIC CARDIOMYOPATHY: ICD-10-CM

## 2020-03-25 PROCEDURE — 93295 DEV INTERROG REMOTE 1/2/MLT: CPT | Performed by: INTERNAL MEDICINE

## 2020-03-25 PROCEDURE — 93296 REM INTERROG EVL PM/IDS: CPT | Performed by: INTERNAL MEDICINE

## 2020-03-25 NOTE — TELEPHONE ENCOUNTER
PT's daughter calls to report that PT's BP has been running a little low (90's/60's) HR 90's- currently taking Lisinopril 5 mg daily and Metoprolol 12.5 mg BID, lasix 20 mg daily-      She c/o feeling tired and dizzy with the low BP readings-   Will have her to hold Lisinopril for now- monitor BP and HR daily- update me Monday- may restart Lisinopril 2.5 mg daily over the weekend if BP comes up and consistently greater than 130 systolic     Pt's daughter verbalized understanding- she will contact device clinic to make sure that pt's home monitor is transmitting correctly-

## 2020-06-10 ENCOUNTER — OFFICE VISIT (OUTPATIENT)
Dept: CARDIOLOGY | Facility: CLINIC | Age: 45
End: 2020-06-10

## 2020-06-10 VITALS
WEIGHT: 188 LBS | DIASTOLIC BLOOD PRESSURE: 68 MMHG | OXYGEN SATURATION: 97 % | BODY MASS INDEX: 34.6 KG/M2 | HEIGHT: 62 IN | SYSTOLIC BLOOD PRESSURE: 96 MMHG | HEART RATE: 79 BPM

## 2020-06-10 DIAGNOSIS — I50.21 ACUTE SYSTOLIC HEART FAILURE (HCC): ICD-10-CM

## 2020-06-10 DIAGNOSIS — Z95.810 ICD (IMPLANTABLE CARDIOVERTER-DEFIBRILLATOR) IN PLACE: Primary | ICD-10-CM

## 2020-06-10 DIAGNOSIS — I50.22 CHRONIC SYSTOLIC HEART FAILURE (HCC): ICD-10-CM

## 2020-06-10 PROCEDURE — 99213 OFFICE O/P EST LOW 20 MIN: CPT | Performed by: PHYSICIAN ASSISTANT

## 2020-06-10 PROCEDURE — 93282 PRGRMG EVAL IMPLANTABLE DFB: CPT | Performed by: PHYSICIAN ASSISTANT

## 2020-06-10 NOTE — PROGRESS NOTES
[facility]   OFFICE NOTE      Jennifer Dey  1975  PCP: Conner Kumar DO    SUBJECTIVE:   Jennifer Dey is a 44 y.o. female seen for a follow up visit regarding the following:     CC:ICM    HPI:   Pleasant 44-year-old female presents today follow-up regarding ischemic cardiomyopathy, coronary disease, tobacco abuse, Biotronik single-chamber ICD.  Since last office visit she continues to do well she has had no recurrent episodes of chest pain or chest tightness.  She has not had any worsening heart failure symptoms.  She has had no ICD shocks palpitations or syncope events.  Unfortunately she continues to smoke 2 packs a day.    Cardiac PMH: (Old records have been reviewed and summarized below)  1. Coronary artery disease:  a. Acute STEMI, 12/03/2018.  b. LHC, 12/03/2018: Successful PTCA/stent placement ×2 in the proximal and mid LAD using overlapping 2.5 x 18 mm Yvrose AUBREY postdilated with a 3 mm noncompliant balloon. Noncritical disease in the circumflex and RCA. EF 25% and anterior akinesis with apical dyskinesis.  2. Ischemic cardiomyopathy/Chronic systolic heart failure:  a. Echocardiogram, 12/05/2018: EF 20-25%. Trace-to-mild MR. Trace TR. No evidence of LV apical thrombus  b. LifeVest at discharge.  c. Echocardiogram, 03/06/2019: EF 30-35%. Anteroseptal akinesis.  d. ICD placement, 04/08/2019, Dr. Hernandez: Insertion of Biotronik VDI implantable cardioverter defibrillator for primary prevention of sudden death.  3. Hyperlipidemia.  4. Tobacco abuse, 2 PPD now down to 4-5 cigs/d.  5. Low back pain.       Past Medical History, Past Surgical History, Family history, Social History, and Medications were all reviewed with the patient today and updated as necessary.       Current Outpatient Medications:   •  acetaminophen (TYLENOL) 500 MG tablet, Take 1,000 mg by mouth Every 6 (Six) Hours As Needed for Mild Pain ., Disp: , Rfl:   •  aspirin 81 MG  chewable tablet, chew and swallow 1 tablet by mouth once daily, Disp: 90 tablet, Rfl: 3  •  atorvastatin (LIPITOR) 80 MG tablet, Take 1 tablet by mouth Every Night., Disp: 90 tablet, Rfl: 11  •  betamethasone dipropionate (DIPROLENE) 0.05 % cream, Apply  topically to the appropriate area as directed 2 (Two) Times a Day. (Patient taking differently: Apply 1 application topically to the appropriate area as directed 2 (Two) Times a Day.), Disp: 15 g, Rfl: 2  •  buPROPion XL (WELLBUTRIN XL) 150 MG 24 hr tablet, Take 1 tablet by mouth Daily., Disp: 30 tablet, Rfl: 0  •  clopidogrel (PLAVIX) 75 MG tablet, TAKE 1 TABLET BY MOUTH ONCE DAILY, Disp: 90 tablet, Rfl: 1  •  furosemide (LASIX) 20 MG tablet, Take 1 tablet by mouth Daily., Disp: 30 tablet, Rfl: 11  •  lisinopril (PRINIVIL,ZESTRIL) 5 MG tablet, Take 1 tablet by mouth Daily., Disp: 30 tablet, Rfl: 11  •  metoprolol tartrate (LOPRESSOR) 25 MG tablet, TAKE 0.5 TABLET BY MOUTH EVERY 12 HOURS, Disp: 60 tablet, Rfl: 11  •  nitroglycerin (NITROSTAT) 0.4 MG SL tablet, PLACE 1 TABLET UNDER TONGUE EVERY 5 MINUTES AS NEEDED FOR CHEST PAIN TAKE- NO MORE THAN 3 DOSES IN 15 MINUTES, Disp: 100 tablet, Rfl: 2  •  sertraline (ZOLOFT) 25 MG tablet, Take 1 tablet by mouth Daily. Needs an appointment for additional refills, Disp: 30 tablet, Rfl: 0  •  spironolactone (ALDACTONE) 25 MG tablet, take 1 tablet by mouth once daily, Disp: 90 tablet, Rfl: 1      No Known Allergies  Patient Active Problem List   Diagnosis   • Anterior STEMI S/P stent X2 LAD per Dr. Boothe on 12/3   • Current smoker   • Bilateral pulmonary nodules   • Class 2 obesity in adult   • STEMI involving left anterior descending coronary artery (CMS/HCC)   • Hyperlipidemia LDL goal <70   • Acute systolic heart failure (CMS/HCC)   • Stress incontinence   • Acute respiratory failure with hypoxia (CMS/HCC)   • Coronary artery disease involving native coronary artery of native heart without angina pectoris   •  Ischemic cardiomyopathy   • Chronic systolic heart failure (CMS/[facility])   • ICD (implantable cardioverter-defibrillator) in place     Past Medical History:   Diagnosis Date   • Anxiety    • CAD (coronary artery disease)    • Current smoker 12/3/2018   • Elevated cholesterol    • Hyperlipidemia    • Hypertension    • Hypothyroidism not on replacement 12/03/2018   • Low back pain 12/3/2018   • MTHFR mutation (CMS/HCC) 12/03/2018   • SHANTA denies CPAP use 12/03/2018   • STEMI (ST elevation myocardial infarction) (CMS/[facility])    • Stress incontinence 12/03/2018     Past Surgical History:   Procedure Laterality Date   • CARDIAC CATHETERIZATION N/A 12/3/2018    Procedure: LEFT HEART CATH;  Surgeon: Elen Boothe MD;  Location:  [facility] CATH INVASIVE LOC[facility];  Service: Cardiology   • CARDIAC ELECTROPHYSIOLOGY PROCEDURE N/A 4/8/2019    Procedure: Device Implant- single chamber ICD;  Surgeon: Bigg Hernandez MD;  Location:  [facility] EP INVASIVE LOCATION;  Service: Cardiology   • CORONARY STENT PLACEMENT      two stents   • CYSTOSCOPY  2012   • TUBAL ABDOMINAL LIGATION  1996   • WISDOM TOOTH EXTRACTION     • WRIST FRACTURE SURGERY  2009     Family History   Problem Relation Age of Onset   • Heart disease Mother    • Breast cancer Mother    • Alzheimer's disease Mother    • Heart disease Father    • Heart disease Sister    • No Known Problems Brother    • Colon cancer Maternal Grandmother    • Heart disease Maternal Grandfather    • Heart disease Paternal Grandfather      Social History     Tobacco Use   • Smoking status: Current Every Day Smoker     Packs/day: 0.50     Years: 30.00     Pack years: 15.00     Types: Cigarettes   • Smokeless tobacco: Never Used   Substance Use Topics   • Alcohol use: No     Frequency: Never       ROS:  Review of Symptoms:  General: no recent weight loss/gain, weakness or fatigue  Skin: no rashes, lumps, or other skin changes  HEENT: no dizziness, lightheadedness, or vision changes  Respiratory:  "no cough or hemoptysis  Cardiovascular: no palpitations, and tachycardia  Gastrointestinal: no black/tarry stools or diarrhea  Urinary: no change in frequency or urgency  Peripheral Vascular: no claudication or leg cramps  Musculoskeletal: no muscle or joint pain/stiffness  Psychiatric: no depression or excessive stress  Neurological: no sensory or motor loss, no syncope  Hematologic: no anemia, easy bruising or bleeding  Endocrine: no thyroid problems, nor heat or cold intolerance    PHYSICAL EXAM:    BP 96/68 (BP Location: Right arm, Patient Position: Sitting)   Pulse 79   Ht 157.5 cm (62\")   Wt 85.3 kg (188 lb)   SpO2 97%   BMI 34.39 kg/m²        Wt Readings from Last 5 Encounters:   06/10/20 85.3 kg (188 lb)   10/03/19 77.7 kg (171 lb 3.2 oz)   07/30/19 80.8 kg (178 lb 3.2 oz)   04/08/19 83.1 kg (183 lb 3.2 oz)   04/02/19 83.5 kg (184 lb)       BP Readings from Last 5 Encounters:   06/10/20 96/68   10/03/19 100/68   07/30/19 124/62   04/09/19 102/58   04/02/19 90/76       General appearance - Alert, well appearing, and in no distress   Mental status - Affect appropriate to mood.  Eyes - Sclerae anicteric,  ENMT - Hearing grossly normal bilaterally, Dental hygiene good.  Neck - Carotids upstroke normal bilaterally, no bruits, no JVD.  Resp - Clear to auscultation, no wheezes, rales or rhonchi, symmetric air entry.  Heart - Normal rate, regular rhythm, normal S1, S2, no murmurs, rubs, clicks or gallops.  GI - Soft, nontender, nondistended, no masses or organomegaly.  Neurological - Grossly intact - normal speech, no focal findings  Musculoskeletal - No joint tenderness, deformity or swelling, no muscular tenderness noted.  Extremities - Peripheral pulses normal, no pedal edema, no clubbing or cyanosis.  Skin - Normal coloration and turgor.  Psych -  oriented to person, place, and time.    Medical problems and test results were reviewed with the patient today.       Device Interrogation:  Please see device " interrogation form which has been signed and updated for full details.  Biotronik single-chamber ICD.  VVI at 40.  P wave 6.7.  R wave 24.4.  RV threshold 0.5 at 0.4 ms.  RV impedance 551 ohms.  Battery voltage is acceptable.    ASSESSMENT   1. ICM: Continue GDMT, BB and ACE  2. Chronic Class II SHF  3. HLD: Lipitor   4. Tobacco abuse:  Discussed need for cessation  5. Biotronik ICD: Stable.     PLAN  · Continue current medical therapy  · Long discussion regards need for tobacco cessation she would like to try Chantix.  · Follow up with Dr. Boothe as scheduled, and with Dr. Hernandez 6 months.      6/10/2020  11:45    Will Sia ENGLE

## 2020-06-26 RX ORDER — NITROGLYCERIN 0.4 MG/1
0.4 TABLET SUBLINGUAL
Qty: 25 TABLET | Refills: 11 | Status: SHIPPED | OUTPATIENT
Start: 2020-06-26 | End: 2020-07-16 | Stop reason: SDUPTHER

## 2020-07-16 ENCOUNTER — OFFICE VISIT (OUTPATIENT)
Dept: CARDIOLOGY | Facility: CLINIC | Age: 45
End: 2020-07-16

## 2020-07-16 VITALS
OXYGEN SATURATION: 99 % | SYSTOLIC BLOOD PRESSURE: 112 MMHG | HEIGHT: 61 IN | WEIGHT: 186 LBS | BODY MASS INDEX: 35.12 KG/M2 | HEART RATE: 79 BPM | DIASTOLIC BLOOD PRESSURE: 80 MMHG

## 2020-07-16 DIAGNOSIS — I47.1 PAROXYSMAL SVT (SUPRAVENTRICULAR TACHYCARDIA) (HCC): ICD-10-CM

## 2020-07-16 DIAGNOSIS — I50.22 CHRONIC SYSTOLIC HEART FAILURE (HCC): ICD-10-CM

## 2020-07-16 DIAGNOSIS — E78.5 HYPERLIPIDEMIA LDL GOAL <70: ICD-10-CM

## 2020-07-16 DIAGNOSIS — I47.29 NSVT (NONSUSTAINED VENTRICULAR TACHYCARDIA) (HCC): ICD-10-CM

## 2020-07-16 DIAGNOSIS — I25.10 CORONARY ARTERY DISEASE INVOLVING NATIVE CORONARY ARTERY OF NATIVE HEART WITHOUT ANGINA PECTORIS: Primary | ICD-10-CM

## 2020-07-16 PROBLEM — I47.10 PAROXYSMAL SVT (SUPRAVENTRICULAR TACHYCARDIA): Status: ACTIVE | Noted: 2020-07-16

## 2020-07-16 PROCEDURE — 93282 PRGRMG EVAL IMPLANTABLE DFB: CPT | Performed by: INTERNAL MEDICINE

## 2020-07-16 PROCEDURE — 99214 OFFICE O/P EST MOD 30 MIN: CPT | Performed by: INTERNAL MEDICINE

## 2020-07-16 RX ORDER — ROSUVASTATIN CALCIUM 40 MG/1
40 TABLET, COATED ORAL DAILY
Qty: 90 TABLET | Refills: 3 | Status: SHIPPED | OUTPATIENT
Start: 2020-07-16 | End: 2021-04-27

## 2020-07-16 RX ORDER — NITROGLYCERIN 0.4 MG/1
0.4 TABLET SUBLINGUAL
Qty: 25 TABLET | Refills: 11 | Status: SHIPPED | OUTPATIENT
Start: 2020-07-16 | End: 2021-12-08 | Stop reason: SDUPTHER

## 2020-07-16 NOTE — PROGRESS NOTES
[facility]    Patient ID: Jennifer Dey is a 44 y.o. female.  : 1975   Contact: [phone]    Encounter date: 2020    PCP: Conner Kumar DO      Chief complaint:   Chief Complaint   Patient presents with   • Coronary Artery Disease       Problem List:  1. Coronary artery disease:  a. Acute STEMI, 2018.  b. LHC, 2018: Successful PTCA/stent placement ×2 in the proximal and mid LAD using overlapping 2.5 x 18 mm Yvrose AUBREY postdilated. Noncritical disease in the LCx and RCA. EF 25% and anterior akinesis with apical dyskinesis.  2. Ischemic cardiomyopathy/Chronic systolic heart failure:  a. Echo, 2018: EF 20-25%. Trace-to-mild MR. Trace TR. No evidence of LV apical thrombus  b. LifeVest at discharge.  c. Echo, 2019: EF 30-35%. Anteroseptal akinesis.  d. ICD placement, 2019, Dr. Hernandez: Insertion of Innoverne VDI implantable cardioverter defibrillator for primary prevention of SCD.  3. Nonsustained ventricular tachycardia  a. Noted on device interrogation 2020.  4. SVT  5. Hyperlipidemia.  6. Tobacco abuse, 2 PPD now down to 4-5 cigs/d.  7. Low back pain.    No Known Allergies    Current Medications:    Current Outpatient Medications:   •  acetaminophen (TYLENOL) 500 MG tablet, Take 1,000 mg by mouth Every 6 (Six) Hours As Needed for Mild Pain ., Disp: , Rfl:   •  aspirin 81 MG chewable tablet, chew and swallow 1 tablet by mouth once daily, Disp: 90 tablet, Rfl: 3  •  betamethasone dipropionate (DIPROLENE) 0.05 % cream, Apply  topically to the appropriate area as directed 2 (Two) Times a Day. (Patient taking differently: Apply 1 application topically to the appropriate area as directed 2 (Two) Times a Day.), Disp: 15 g, Rfl: 2  •  buPROPion XL (WELLBUTRIN XL) 150 MG 24 hr tablet, Take 1 tablet by mouth Daily., Disp: 30 tablet, Rfl: 0  •  clopidogrel (PLAVIX) 75 MG tablet, TAKE 1 TABLET BY MOUTH ONCE DAILY, Disp: 90  tablet, Rfl: 1  •  furosemide (LASIX) 20 MG tablet, Take 1 tablet by mouth Daily., Disp: 30 tablet, Rfl: 11  •  lisinopril (PRINIVIL,ZESTRIL) 5 MG tablet, Take 1 tablet by mouth Daily., Disp: 30 tablet, Rfl: 11  •  metoprolol tartrate (LOPRESSOR) 25 MG tablet, TAKE 0.5 TABLET BY MOUTH EVERY 12 HOURS, Disp: 60 tablet, Rfl: 11  •  nitroglycerin (NITROSTAT) 0.4 MG SL tablet, Place 1 tablet under the tongue Every 5 (Five) Minutes As Needed for Chest Pain. Take no more than 3 doses in 15 minutes., Disp: 25 tablet, Rfl: 11  •  sertraline (ZOLOFT) 25 MG tablet, Take 1 tablet by mouth Daily. Needs an appointment for additional refills, Disp: 30 tablet, Rfl: 0  •  spironolactone (ALDACTONE) 25 MG tablet, take 1 tablet by mouth once daily, Disp: 90 tablet, Rfl: 1  •  varenicline (Chantix Starting Month Pak) 0.5 MG X 11 & 1 MG X 42 tablet, Take 0.5 mg one daily on days 1-3 and and 0.5 mg twice daily on days 4-7.Then 1 mg twice daily for a total of 12 weeks., Disp: 60 tablet, Rfl: 1  •  rosuvastatin (CRESTOR) 40 MG tablet, Take 1 tablet by mouth Daily., Disp: 90 tablet, Rfl: 3    HPI    Jennifer Dey is a 44 y.o. female who presents today for a follow up of coronary artery disease, ischemic cardiomyopathy s/p ICD, and cardiac risk factors. Since last visit, reports she has done well from a cardiac standpoint.  She denies any chest pain, shortness of breath, palpitations, or lightheadedness.  Her device interrogation today revealed one episode of NSVT that lasted for approximately 15 beats.  She also had 2 less than 10 beat runs of SVT.  She was prescribed Chantix at the time of her last visit with funmilayo Granda PA-C, but she has not tried this yet.  She also notes cramping with atorvastatin at 80 mg.  She has not had recent lab work with her PCP.      The following portions of the patient's history were reviewed and updated as appropriate: allergies, current medications and problem list.    Pertinent positives as listed  "in the HPI.  All other systems reviewed are negative.         Vitals:    07/16/20 0929   BP: 112/80   BP Location: Left arm   Patient Position: Sitting   Pulse: 79   SpO2: 99%   Weight: 84.4 kg (186 lb)   Height: 154.9 cm (61\")       Physical Exam:  General: Alert and oriented.  Neck: Jugular venous pressure is within normal limits. Carotids have normal upstrokes without bruits.   Cardiovascular: Heart has a nondisplaced focal PMI. Regular rate and rhythm. No murmur, gallop or rub.  Lungs: Clear, no rales or wheezes. Equal expansion is noted.   Extremities: Show no edema.  Skin: Warm and dry.  Neurologic: Nonfocal.     Diagnostic Data (reviewed with patient):  Lab Results   Component Value Date    GLUCOSE 121 (H) 04/02/2019    BUN 10 04/02/2019    CREATININE 0.88 04/02/2019    EGFRIFNONA 69 04/02/2019    BCR 11.4 04/02/2019     04/02/2019    K 4.5 04/02/2019     04/02/2019    CO2 23.0 04/02/2019    CALCIUM 9.6 04/02/2019    ALBUMIN 4.72 04/02/2019    ALKPHOS 117 (H) 04/02/2019    AST 26 04/02/2019    ALT 29 04/02/2019     Lab Results   Component Value Date    CHLPL 180 02/13/2019    TRIG 255 (H) 02/13/2019    HDL 30 (L) 02/13/2019    LDL 99 02/13/2019      Lab Results   Component Value Date    WBC 10.85 (H) 04/02/2019    RBC 4.63 04/02/2019    HGB 13.6 04/02/2019    HCT 42.1 04/02/2019    MCV 90.9 04/02/2019     04/02/2019      Lab Results   Component Value Date    TSH 0.831 12/04/2018        Procedures    DEVICE INTERROGATION: Biotronik ICD, Interrogation date 7/16/2020-   RV pacing 0 %. P wave is 6.8 mV. R wave is 24.4 mV with a threshold of 0.6 V at 1.4 msec and an impedance of 580 ohms.  Underlying rhythm is normal sinus rhythm at approximately 80 bpm.  SVT x2 episodes lasting <10 beats.  NSVT x1 episode lasting approximately 15 beats.        Assessment:    ICD-10-CM ICD-9-CM   1. Coronary artery disease involving native coronary artery of native heart without angina pectoris I25.10 414.01 "   2. Hyperlipidemia LDL goal <70 E78.5 272.4   3. Chronic systolic heart failure (CMS/HCC) I50.22 428.22   4. NSVT (nonsustained ventricular tachycardia) (CMS/[facility]) I47.2 427.1   5. Paroxysmal SVT (supraventricular tachycardia) (CMS/[facility]) I47.1 427.0         Plan:  1. Discontinue atorvastatin and switch to Crestor 40 mg daily.  2. Continue all other current medications.  3. Repeat fasting lipid panel in 3 months.  4. Encouraged continued smoking cessation attempts.  Discussed for greater than 3 minutes  5. F/up in 1 year, sooner if needed.         Scribed for Elen Boothe MD by PATRICIA Fofana   7/16/2020  10:03'    I Elen Boothe MD personally performed the services described in this documentation as scribed by the above individual in my presence, and it is both accurate and complete.    Elen Boothe MD, FACC

## 2020-07-29 ENCOUNTER — OFFICE VISIT (OUTPATIENT)
Dept: INTERNAL MEDICINE | Facility: CLINIC | Age: 45
End: 2020-07-29

## 2020-07-29 VITALS
BODY MASS INDEX: 35.3 KG/M2 | HEART RATE: 85 BPM | SYSTOLIC BLOOD PRESSURE: 109 MMHG | TEMPERATURE: 96.6 F | OXYGEN SATURATION: 98 % | WEIGHT: 187 LBS | HEIGHT: 61 IN | DIASTOLIC BLOOD PRESSURE: 79 MMHG

## 2020-07-29 DIAGNOSIS — E66.01 MORBIDLY OBESE (HCC): ICD-10-CM

## 2020-07-29 DIAGNOSIS — F17.200 CURRENT SMOKER: ICD-10-CM

## 2020-07-29 DIAGNOSIS — Z12.11 SCREEN FOR COLON CANCER: ICD-10-CM

## 2020-07-29 DIAGNOSIS — F41.9 ANXIETY: Primary | ICD-10-CM

## 2020-07-29 PROCEDURE — 99214 OFFICE O/P EST MOD 30 MIN: CPT | Performed by: NURSE PRACTITIONER

## 2020-07-29 RX ORDER — BUSPIRONE HYDROCHLORIDE 5 MG/1
5 TABLET ORAL 3 TIMES DAILY PRN
Qty: 90 TABLET | Refills: 0 | Status: SHIPPED | OUTPATIENT
Start: 2020-07-29 | End: 2022-05-19

## 2020-07-29 NOTE — PROGRESS NOTES
"Date: 2020    Name: Jennifer Dey  : 1975    Chief Complaint:   Chief Complaint   Patient presents with   • Anxiety     increased side effect from Chantix       HPI:  Jennifer Dey is a 44 y.o. female presents with increased anxiety after taking Chantix for smoking cessation.  She is currently not taking Chantix for the past 4 days.  Since discontinuing Chantix, anxiety has been slightly reduced.  She had previously been prescribed bupropion 150 mg every 24 hours, did not help curb cravings or help with weight loss.  Patient discontinued use without side effects.  She is currently having difficulty falling asleep, is increasingly agitated and irritated with activities/people who do not normally bother her.  Denies chest pain, dyspnea, orthopnea, palpitations, lower extremity edema, confusion, headaches, weakness, visual disturbances, depression, SI, HI, hallucinations, decreased concentration.    History:  The following portions of the patient's history were reviewed and updated as appropriate: allergies, current medications, past medical history, family history, surgical history, social history and problem list.     ROS:  Review of Systems   Constitutional: Negative.    Respiratory: Negative for wheezing.    Neurological: Negative.        VS:  Vitals:    20 1310   BP: 109/79   Pulse: 85   Temp: 96.6 °F (35.9 °C)   SpO2: 98%   Weight: 84.8 kg (187 lb)   Height: 154.9 cm (61\")     Body mass index is 35.33 kg/m².    PE:  Physical Exam   Constitutional: She is oriented to person, place, and time. She appears well-developed and well-nourished. She appears distressed. She is morbidly obese.  HENT:   Head: Normocephalic.   Right Ear: External ear normal.   Left Ear: External ear normal.   Eyes: Pupils are equal, round, and reactive to light. Conjunctivae are normal.   Neck: Normal range of motion. Neck supple.   Cardiovascular: Normal rate, regular rhythm, normal heart sounds and intact " distal pulses.   Pulmonary/Chest: Effort normal and breath sounds normal.   Neurological: She is alert and oriented to person, place, and time.   Skin: Skin is warm. Capillary refill takes less than 2 seconds.   Psychiatric: She has a normal mood and affect. Her behavior is normal.       Assessment/Plan:  Jennifer was seen today for anxiety.    Diagnoses and all orders for this visit:    Anxiety  -     busPIRone (BUSPAR) 5 MG tablet; Take 1 tablet by mouth 3 (Three) Times a Day As Needed (anxiety).  Advised may cause slight drowsiness.        - Encouraged to take part in daily physical exercise.          - Eat healthy, well balanced diet; avoid sugary foods or beverages        - Limit alcohol intake        - Ensure good night's sleep by creating calm space in bedroom, avoiding screen time 1-2 hours before bed, no caffeine after 5 pm        - Talk to supportive family and friends, as needed  Screen for colon cancer  -     Ambulatory Referral For Screening Colonoscopy  Morbidly obese (CMS/HCC)        - Patient's Body mass index is 35.33 kg/m². BMI is above normal parameters. Recommendations include: exercise counseling and nutrition counseling.  Current smoker        - Patient plans to start Chantix, using initial prescription instructions after taking BuSpar for a few days.    Return in about 4 weeks (around 8/26/2020) for Annual w/pap.

## 2020-08-03 RX ORDER — SPIRONOLACTONE 25 MG/1
TABLET ORAL
Qty: 90 TABLET | Refills: 0 | Status: SHIPPED | OUTPATIENT
Start: 2020-08-03 | End: 2021-04-01 | Stop reason: SDUPTHER

## 2020-08-12 ENCOUNTER — TELEPHONE (OUTPATIENT)
Dept: INTERNAL MEDICINE | Facility: CLINIC | Age: 45
End: 2020-08-12

## 2020-08-12 ENCOUNTER — TELEPHONE (OUTPATIENT)
Dept: CARDIOLOGY | Facility: CLINIC | Age: 45
End: 2020-08-12

## 2020-08-12 NOTE — TELEPHONE ENCOUNTER
PT's daughter calls to report PT is c/o shoulder pain and tenderness to touch- the seem to think this may be related to Rosuvastatin- we switched Atorvastatin to Rosuvastatin last month for the same complaints-     LM on daughter's VM to have PT hold Rosuvastatin 7-10 days and to update me with whether symptoms resolved or not

## 2020-08-12 NOTE — TELEPHONE ENCOUNTER
Patients daughter ( emma) is calling about shoulder pain in left shoulder. patient is having.  Emma is  Inquiring about a muscle relaxer or if they need an appointment.   Patient is also on a medication for depression and is having no side effects... Patients feels as if it is a muscle pain.  Patients daughter would like a call back in reference to what action or option is available.  Please advise    Emma Biswas call back  [phone]

## 2020-09-11 ENCOUNTER — NURSE TRIAGE (OUTPATIENT)
Dept: OTHER | Facility: CLINIC | Age: 45
End: 2020-09-11

## 2020-09-11 NOTE — TELEPHONE ENCOUNTER
Rash, itchy, blotchy, muscle pain     Reason for Disposition   Localized rash is very painful (no fever)    Answer Assessment - Initial Assessment Questions  1. APPEARANCE of RASH: \"Describe the rash. \"       Bumpy ,red   2. LOCATION: \"Where is the rash located? \"       Back of neck and goes half way down back  3. NUMBER: \"How many spots are there? \"       Unable to count   4. SIZE: \"How big are the spots? \" (Inches, centimeters or compare to size of a coin)       Pen dot  5. ONSET: \"When did the rash start? \"       3 days ago  6. ITCHING: \"Does the rash itch? \" If so, ask: \"How bad is the itch? \"  (Scale 1-10; or mild, moderate, severe)      8  7. PAIN: \"Does the rash hurt? \" If so, ask: \"How bad is the pain? \"  (Scale 1-10; or mild, moderate, severe)      7  8. OTHER SYMPTOMS: \"Do you have any other symptoms? \" (e.g., fever)      Family denies   5. PREGNANCY: \"Is there any chance you are pregnant? \" \"When was your last menstrual period? \"     No    Protocols used: RASH OR REDNESS - LOCALIZED-ADULT-[place]

## 2020-09-11 NOTE — TELEPHONE ENCOUNTER
Pt understands all care advise. Pt will go to [facility] or ED if no appointments are available. Warm transfer to scheduling. Please do not respond to the triage nurse through this encounter. Any subsequent communication should be directly with the patient.

## 2020-09-12 ENCOUNTER — OFFICE VISIT (OUTPATIENT)
Dept: PRIMARY CARE CLINIC | Age: 45
End: 2020-09-12
Payer: COMMERCIAL

## 2020-09-12 VITALS — TEMPERATURE: 96.7 F | OXYGEN SATURATION: 97 % | HEART RATE: 80 BPM

## 2020-09-12 PROCEDURE — 99213 OFFICE O/P EST LOW 20 MIN: CPT | Performed by: NURSE PRACTITIONER

## 2020-09-12 RX ORDER — TRIAMCINOLONE ACETONIDE 1 MG/G
CREAM TOPICAL
Qty: 1 TUBE | Refills: 0 | Status: SHIPPED | OUTPATIENT
Start: 2020-09-12

## 2020-09-12 NOTE — PROGRESS NOTES
9/12/2020    This is a 50 y.o. female   Chief Complaint   Patient presents with    Rash     pt. states dure to medication    Muscle Pain   . Complaints of rash on back of her neck and left shoulder/muscle pain for the last 2 weeks. States discomfort started after started after cholestrol meds were taken       Current Outpatient Medications   Medication Sig Dispense Refill    triamcinolone (KENALOG) 0.1 % cream Apply topically 2 times daily. 1 Tube 0    acetaminophen (TYLENOL) 500 MG tablet Take 500 mg by mouth every 6 hours as needed for Pain      aspirin 81 MG chewable tablet Take 81 mg by mouth daily      atorvastatin (LIPITOR) 40 MG tablet Take 40 mg by mouth daily      clopidogrel (PLAVIX) 75 MG tablet Take 75 mg by mouth daily      furosemide (LASIX) 20 MG tablet Take 20 mg by mouth daily      metoprolol tartrate (LOPRESSOR) 25 MG tablet Take 25 mg by mouth 2 times daily      nitroGLYCERIN (NITROSTAT) 0.4 MG SL tablet Place 0.4 mg under the tongue every 5 minutes as needed for Chest pain up to max of 3 total doses. If no relief after 1 dose, call 911.  spironolactone (ALDACTONE) 25 MG tablet Take 25 mg by mouth daily      sertraline (ZOLOFT) 25 MG tablet Take 25 mg by mouth daily      hydrOXYzine (ATARAX) 25 MG tablet Take 25 mg by mouth 3 times daily as needed for Itching       No current facility-administered medications for this visit. No Known Allergies    Review of Systems   Musculoskeletal: Positive for arthralgias. Skin: Positive for rash. All other systems reviewed and are negative. Pulse 80   Temp 96.7 °F (35.9 °C) (Temporal)   SpO2 97%     Physical Exam  Vitals signs reviewed. Constitutional:       Appearance: She is well-developed. HENT:      Head: Normocephalic and atraumatic. Nose: Nose normal.   Eyes:      Pupils: Pupils are equal, round, and reactive to light. Neck:      Musculoskeletal: Normal range of motion.    Cardiovascular:      Rate and Rhythm: Normal rate and regular rhythm. Heart sounds: Normal heart sounds. Pulmonary:      Effort: Pulmonary effort is normal.      Breath sounds: Normal breath sounds. Abdominal:      General: Bowel sounds are normal.      Palpations: Abdomen is soft. Musculoskeletal: Normal range of motion. Skin:     General: Skin is warm and dry. Findings: Rash (on upper back, under where hair is laying on her neck/back) present. Neurological:      Mental Status: She is alert and oriented to person, place, and time. Diagnosis  No diagnosis found. Assessment and Plan  No orders of the defined types were placed in this encounter. Orders Placed This Encounter   Medications    triamcinolone (KENALOG) 0.1 % cream     Sig: Apply topically 2 times daily. Dispense:  1 Tube     Refill:  0       Return if symptoms worsen or fail to improve, for patient to call cardiology on Monday to discuss problems while taking the cholestrol medications.

## 2020-09-12 NOTE — PATIENT INSTRUCTIONS
Patient Education        Rash: Care Instructions  Your Care Instructions  A rash is any irritation or inflammation of the skin. Rashes have many possible causes, including allergy, infection, illness, heat, and emotional stress. Follow-up care is a key part of your treatment and safety. Be sure to make and go to all appointments, and call your doctor if you are having problems. It's also a good idea to know your test results and keep a list of the medicines you take. How can you care for yourself at home? · Wash the area with water only. Soap can make dryness and itching worse. Pat dry. · Put cold, wet cloths on the rash to reduce itching. · Keep cool, and stay out of the sun. · Leave the rash open to the air as much of the time as possible. · Sometimes petroleum jelly (Vaseline) can help relieve the discomfort caused by a rash. A moisturizing lotion, such as Cetaphil, also may help. Calamine lotion may help for rashes caused by contact with something (such as a plant or soap) that irritated the skin. Use it 3 or 4 times a day. · If your doctor prescribed a cream, use it as directed. If your doctor prescribed medicine, take it exactly as directed. · If your rash itches so badly that it interferes with your normal activities, take an over-the-counter antihistamine, such as diphenhydramine (Benadryl) or loratadine (Claritin). Read and follow all instructions on the label. When should you call for help? Call your doctor now or seek immediate medical care if:  · You have signs of infection, such as:  ? Increased pain, swelling, warmth, or redness. ? Red streaks leading from the area. ? Pus draining from the area. ? A fever. · You have joint pain along with the rash. Watch closely for changes in your health, and be sure to contact your doctor if:  · Your rash is changing or getting worse. For example, call if you have pain along with the rash, the rash is spreading, or you have new blisters.   · You do

## 2020-10-09 ENCOUNTER — TELEPHONE (OUTPATIENT)
Dept: INTERNAL MEDICINE | Facility: CLINIC | Age: 45
End: 2020-10-09

## 2020-10-09 NOTE — TELEPHONE ENCOUNTER
Patient's daughter is calling in to have a dosage in crease on the prescriptionbusPIRone (BUSPAR) 5 MG tablet.  Patient states that the provider told her in the office if it doesn't work then to go onto mychart and request a dosage increase.  Patient is having issues with mychart and called in to make the request.  Please advise      Jennifer Logsdon call back [phone]    Pharmacy [address], [place], [place]  [phone]

## 2020-10-09 NOTE — TELEPHONE ENCOUNTER
She was supposed to follow up in 4 weeks.  No increase until we see her, either in office or via video visit.  Thank you.

## 2020-10-29 ENCOUNTER — TELEPHONE (OUTPATIENT)
Dept: CARDIOLOGY | Facility: CLINIC | Age: 45
End: 2020-10-29

## 2020-10-29 NOTE — TELEPHONE ENCOUNTER
Called pt due to [employer] home monitor isn't reading.  Spoke to daughter and she thinks there is something wrong with the plug.  She will call [employer] to see what is going on with monitor.

## 2021-01-28 RX ORDER — LISINOPRIL 5 MG/1
5 TABLET ORAL DAILY
Qty: 30 TABLET | Refills: 5 | Status: SHIPPED | OUTPATIENT
Start: 2021-01-28 | End: 2021-11-04 | Stop reason: SDUPTHER

## 2021-01-28 RX ORDER — ATORVASTATIN CALCIUM 80 MG/1
TABLET, FILM COATED ORAL
Qty: 90 TABLET | OUTPATIENT
Start: 2021-01-28

## 2021-02-03 RX ORDER — ASPIRIN 81 MG/1
81 TABLET, CHEWABLE ORAL DAILY
Qty: 90 TABLET | Refills: 3 | Status: SHIPPED | OUTPATIENT
Start: 2021-02-03 | End: 2022-02-18

## 2021-02-03 RX ORDER — FUROSEMIDE 20 MG/1
20 TABLET ORAL DAILY
Qty: 90 TABLET | Refills: 0 | Status: SHIPPED | OUTPATIENT
Start: 2021-02-03 | End: 2021-07-19

## 2021-04-01 DIAGNOSIS — I50.21 ACUTE SYSTOLIC HEART FAILURE (HCC): Primary | ICD-10-CM

## 2021-04-01 RX ORDER — SPIRONOLACTONE 25 MG/1
25 TABLET ORAL DAILY
Qty: 90 TABLET | Refills: 0 | Status: SHIPPED | OUTPATIENT
Start: 2021-04-01 | End: 2021-11-04 | Stop reason: SDUPTHER

## 2021-04-14 ENCOUNTER — HOSPITAL ENCOUNTER (OUTPATIENT)
Facility: HOSPITAL | Age: 46
Discharge: HOME | End: 2021-04-14
Payer: COMMERCIAL

## 2021-04-14 LAB
A/G RATIO: 1.6 (ref 0.8–2)
ALBUMIN SERPL-MCNC: 4.4 G/DL (ref 3.4–4.8)
ALP BLD-CCNC: 104 U/L (ref 25–100)
ALT SERPL-CCNC: 14 U/L (ref 4–36)
ANION GAP SERPL CALCULATED.3IONS-SCNC: 13 MMOL/L (ref 3–16)
AST SERPL-CCNC: 15 U/L (ref 8–33)
BILIRUB SERPL-MCNC: <0.2 MG/DL (ref 0.3–1.2)
BUN BLDV-MCNC: 10 MG/DL (ref 6–20)
CALCIUM SERPL-MCNC: 10 MG/DL (ref 8.5–10.5)
CHLORIDE BLD-SCNC: 102 MMOL/L (ref 98–107)
CHOLESTEROL, TOTAL: 271 MG/DL (ref 0–200)
CO2: 27 MMOL/L (ref 20–30)
CREAT SERPL-MCNC: 0.7 MG/DL (ref 0.4–1.2)
GFR AFRICAN AMERICAN: >59
GFR NON-AFRICAN AMERICAN: >60
GLOBULIN: 2.8 G/DL
GLUCOSE BLD-MCNC: 117 MG/DL (ref 74–106)
HDLC SERPL-MCNC: 27 MG/DL (ref 40–60)
LDL CHOLESTEROL CALCULATED: 171 MG/DL
POTASSIUM SERPL-SCNC: 4.7 MMOL/L (ref 3.4–5.1)
SODIUM BLD-SCNC: 142 MMOL/L (ref 136–145)
TOTAL PROTEIN: 7.2 G/DL (ref 6.4–8.3)
TRIGL SERPL-MCNC: 366 MG/DL (ref 0–249)
VLDLC SERPL CALC-MCNC: 73 MG/DL

## 2021-04-14 PROCEDURE — 80061 LIPID PANEL: CPT

## 2021-04-14 PROCEDURE — 36415 COLL VENOUS BLD VENIPUNCTURE: CPT

## 2021-04-14 PROCEDURE — 80053 COMPREHEN METABOLIC PANEL: CPT

## 2021-04-27 ENCOUNTER — OFFICE VISIT (OUTPATIENT)
Dept: CARDIOLOGY | Facility: CLINIC | Age: 46
End: 2021-04-27

## 2021-04-27 VITALS
WEIGHT: 197 LBS | HEART RATE: 93 BPM | BODY MASS INDEX: 36.25 KG/M2 | SYSTOLIC BLOOD PRESSURE: 134 MMHG | DIASTOLIC BLOOD PRESSURE: 90 MMHG | HEIGHT: 62 IN

## 2021-04-27 DIAGNOSIS — I50.22 CHRONIC SYSTOLIC HEART FAILURE (HCC): ICD-10-CM

## 2021-04-27 DIAGNOSIS — I47.29 NSVT (NONSUSTAINED VENTRICULAR TACHYCARDIA) (HCC): ICD-10-CM

## 2021-04-27 DIAGNOSIS — E78.5 HYPERLIPIDEMIA LDL GOAL <70: ICD-10-CM

## 2021-04-27 DIAGNOSIS — I25.10 CORONARY ARTERY DISEASE INVOLVING NATIVE CORONARY ARTERY OF NATIVE HEART WITHOUT ANGINA PECTORIS: Primary | ICD-10-CM

## 2021-04-27 DIAGNOSIS — I47.1 PAROXYSMAL SVT (SUPRAVENTRICULAR TACHYCARDIA) (HCC): ICD-10-CM

## 2021-04-27 PROCEDURE — 93283 PRGRMG EVAL IMPLANTABLE DFB: CPT | Performed by: INTERNAL MEDICINE

## 2021-04-27 PROCEDURE — 99214 OFFICE O/P EST MOD 30 MIN: CPT | Performed by: INTERNAL MEDICINE

## 2021-04-27 RX ORDER — PRAVASTATIN SODIUM 20 MG
20 TABLET ORAL DAILY
Qty: 90 TABLET | Refills: 1 | Status: SHIPPED | OUTPATIENT
Start: 2021-04-27 | End: 2021-11-04 | Stop reason: SDUPTHER

## 2021-04-27 NOTE — PROGRESS NOTES
[facility] Cardiology    Patient ID: Jennifer Dey is a 45 y.o. female.  : 1975   Contact: [phone]    Encounter date: 2021    PCP: Conner Kumar DO      Chief complaint:   Chief Complaint   Patient presents with   • Coronary Artery Disease       Problem List:  1. Coronary artery disease:  a. Acute STEMI, 2018.  b. LHC, 2018: Successful PTCA/stent placement ×2 in the proximal and mid LAD using overlapping 2.5 x 18 mm Yvrose AUBREY postdilated. Noncritical disease in the LCx and RCA. EF 25% and anterior akinesis with apical dyskinesis.  2. Ischemic cardiomyopathy/Chronic systolic heart failure:  a. Echo, 2018: EF 20-25%. Trace-to-mild MR. Trace TR. No evidence of LV apical thrombus  b. LifeVest at discharge.  c. Echo, 2019: EF 30-35%. Anteroseptal akinesis.  d. ICD placement, 2019, Dr. Hernandez: Insertion of [employer] VDI implantable cardioverter defibrillator for primary prevention of SCD.  3. Nonsustained ventricular tachycardia  a. Noted on device interrogation 2020.  4. SVT  5. Hyperlipidemia.  6. Tobacco abuse, 2 PPD now down to 4-5 cigs/d.  7. Low back pain.    Allergies   Allergen Reactions   • Atorvastatin Myalgia   • Rosuvastatin Myalgia       Current Medications:    Current Outpatient Medications:   •  acetaminophen (TYLENOL) 500 MG tablet, Take 1,000 mg by mouth Every 6 (Six) Hours As Needed for Mild Pain ., Disp: , Rfl:   •  aspirin 81 MG chewable tablet, Chew 1 tablet Daily. 1 tab po daily, Disp: 90 tablet, Rfl: 3  •  busPIRone (BUSPAR) 5 MG tablet, Take 1 tablet by mouth 3 (Three) Times a Day As Needed (anxiety)., Disp: 90 tablet, Rfl: 0  •  furosemide (LASIX) 20 MG tablet, Take 1 tablet by mouth Daily., Disp: 90 tablet, Rfl: 0  •  lisinopril (PRINIVIL,ZESTRIL) 5 MG tablet, Take 1 tablet by mouth Daily., Disp: 30 tablet, Rfl: 5  •  metoprolol tartrate (LOPRESSOR) 25 MG tablet, 1/2 tab po bid, Disp: 90 tablet,  "Rfl: 3  •  nitroglycerin (NITROSTAT) 0.4 MG SL tablet, Place 1 tablet under the tongue Every 5 (Five) Minutes As Needed for Chest Pain. Take no more than 3 doses in 15 minutes., Disp: 25 tablet, Rfl: 11  •  spironolactone (ALDACTONE) 25 MG tablet, Take 1 tablet by mouth Daily., Disp: 90 tablet, Rfl: 0  •  pravastatin (Pravachol) 20 MG tablet, Take 1 tablet by mouth Daily., Disp: 90 tablet, Rfl: 1    HPI    Jennifer Dey is a 45 y.o. female who presents today for a follow up of coronary artery disease, hyperlipidemia, chronic systolic heart failure, NSVT, and paroxysmal SVT. Since last visit, she discontinued rosuvastatin and atorvastatin due to myalgia in her left shoulder. She cannot lift her arm above her head comfortably. She reports numbness in her hands and mentions when she wakes up in the morning her middle left finger is locked downward. She walks for an hour without a break, 3 times a day. Patient otherwise denies chest pain, shortness of breath, palpitations, edema, dizziness, and syncope.         The following portions of the patient's history were reviewed and updated as appropriate: allergies, current medications and problem list.    Pertinent positives as listed in the HPI.  All other systems reviewed are negative.         Vitals:    04/27/21 1346   BP: 134/90   BP Location: Left arm   Patient Position: Sitting   Pulse: 93   Weight: 89.4 kg (197 lb)   Height: 157.5 cm (62\")       Physical Exam:  General: Alert and oriented.  Neck: Jugular venous pressure is within normal limits. Carotids have normal upstrokes without bruits.   Cardiovascular: Heart has a nondisplaced focal PMI. Regular rate and rhythm. No murmur, gallop or rub.  Lungs: Clear, no rales or wheezes. Equal expansion is noted.   Extremities: Show no edema.  Skin: Warm and dry.  Neurologic: Nonfocal.     Diagnostic Data (reviewed with patient):    Lab date: 4/14/2021  • FLP: , , HDL 27,   • CMP: Glu 117, BUN 10, " Creat 0.7, eGFR >60, Na 142, K 4.7, Cl 102, CO2 27, Ca 10.0, Alk Phos 104, AST 15, ALT 14         Procedures     MANUAL DEVICE INTERROGATION, 4/27/2021, [employer] ICD [place]:   RV pacing 0%.  P wave is 7.9 mV   R wave is 24.4 mV with a threshold of 0.6 V at 0.4 msec and an impedance of 114 ohms.  Battery voltage is 100%  Events: A few short AT's (9) ~150 bpm  Device updates: HM reset.        Assessment:    ICD-10-CM ICD-9-CM   1. Coronary artery disease involving native coronary artery of native heart without angina pectoris  I25.10 414.01   2. Hyperlipidemia LDL goal <70  E78.5 272.4   3. Chronic systolic heart failure (CMS/HCC)  I50.22 428.22   4. NSVT (nonsustained ventricular tachycardia) (CMS/[facility])  I47.2 427.1   5. Paroxysmal SVT (supraventricular tachycardia) (CMS/[facility])  I47.1 427.0         Plan:  1. Begin pravastatin 20 mg daily due to  and low HDL.  2. FLP/LFTs recheck in 3 months.  3. Continue on aspirin 81 mg for antiplatelet therapy.  4. Continue on Lasix 20 mg and spironolactone 24 mg daily for congestive heart failure.   5. Continue on metoprolol 25 mg for rate control.   6. Continue all other current medications.  7. F/up in 6 months, sooner if needed.    Scribed for Elen Boothe MD by Ying Quintanilla. 4/27/2021 14:07 EDT    I Elen Boothe MD personally performed the services described in this documentation as scribed by the above individual in my presence, and it is both accurate and complete.    Elen Boothe MD, [facility]

## 2021-06-29 ENCOUNTER — TELEPHONE (OUTPATIENT)
Dept: CARDIOLOGY | Facility: CLINIC | Age: 46
End: 2021-06-29

## 2021-06-29 NOTE — TELEPHONE ENCOUNTER
Spoke with daughter and ask her to check her moms home monitor. It has not transmitted since November. I also gave her the number to tech support at [employer].

## 2021-07-19 RX ORDER — FUROSEMIDE 20 MG/1
20 TABLET ORAL DAILY
Qty: 90 TABLET | Refills: 1 | Status: SHIPPED | OUTPATIENT
Start: 2021-07-19 | End: 2021-11-04 | Stop reason: SDUPTHER

## 2021-07-19 NOTE — TELEPHONE ENCOUNTER
Lab Results   Component Value Date    GLUCOSE 121 (H) 04/02/2019    BUN 10 04/02/2019    CREATININE 0.88 04/02/2019    EGFRIFNONA 69 04/02/2019    BCR 11.4 04/02/2019    K 4.5 04/02/2019    CO2 23.0 04/02/2019    CALCIUM 9.6 04/02/2019    ALBUMIN 4.72 04/02/2019    AST 26 04/02/2019    ALT 29 04/02/2019

## 2021-07-20 ENCOUNTER — TELEPHONE (OUTPATIENT)
Dept: CARDIOLOGY | Facility: CLINIC | Age: 46
End: 2021-07-20

## 2021-07-20 NOTE — TELEPHONE ENCOUNTER
Daughter called with concerns of [employer] home monitor not working.  She left message saying monitor won't even turn on and she wanted # to [employer] to troubleshoot.  Called her back and left message giving her the # to [employer]

## 2021-11-03 NOTE — PROGRESS NOTES
[facility] Cardiology    Patient ID: Jennifer Dey is a 46 y.o. female.  : 1975   Contact: [phone]    Encounter date: 2021    PCP: Conner Kumar DO      Chief complaint:   Chief Complaint   Patient presents with   • Coronary Artery Disease       Problem List:  1. Coronary artery disease:  a. Acute STEMI, 2018.  b. LHC, 2018: Successful PTCA/stent placement ×2 in the proximal and mid LAD using overlapping 2.5 x 18 mm Yvrose AUBREY postdilated. Noncritical disease in the LCx and RCA. EF 25% and anterior akinesis with apical dyskinesis.  2. Ischemic cardiomyopathy/Chronic systolic heart failure:  a. Echo, 2018: EF 20-25%. Trace-to-mild MR. Trace TR. No evidence of LV apical thrombus  b. LifeVest at discharge.  c. Echo, 2019: EF 30-35%. Anteroseptal akinesis.  d. ICD placement, 2019, Dr. Hernandez: Insertion of [employer] VDI implantable cardioverter defibrillator for primary prevention of SCD.  3. Nonsustained ventricular tachycardia  a. Noted on device interrogation 2020.  4. SVT  5. Hyperlipidemia.  6. Tobacco abuse, 2 PPD now down to 4-5 cigs/d.  7. Low back pain.    Allergies   Allergen Reactions   • Atorvastatin Myalgia   • Rosuvastatin Myalgia       Current Medications:    Current Outpatient Medications:   •  acetaminophen (TYLENOL) 500 MG tablet, Take 1,000 mg by mouth Every 6 (Six) Hours As Needed for Mild Pain ., Disp: , Rfl:   •  aspirin 81 MG chewable tablet, Chew 1 tablet Daily. 1 tab po daily, Disp: 90 tablet, Rfl: 3  •  busPIRone (BUSPAR) 5 MG tablet, Take 1 tablet by mouth 3 (Three) Times a Day As Needed (anxiety)., Disp: 90 tablet, Rfl: 0  •  furosemide (LASIX) 20 MG tablet, Take 1 tablet by mouth Daily., Disp: 90 tablet, Rfl: 3  •  lisinopril (PRINIVIL,ZESTRIL) 5 MG tablet, Take 1 tablet by mouth Daily., Disp: 90 tablet, Rfl: 3  •  metoprolol tartrate (LOPRESSOR) 25 MG tablet, TAKE 1/2 TABLET BY MOUTH TWICE  "DAILY, Disp: 90 tablet, Rfl: 3  •  nitroglycerin (NITROSTAT) 0.4 MG SL tablet, Place 1 tablet under the tongue Every 5 (Five) Minutes As Needed for Chest Pain. Take no more than 3 doses in 15 minutes., Disp: 25 tablet, Rfl: 11  •  pravastatin (Pravachol) 20 MG tablet, Take 1 tablet by mouth Daily., Disp: 90 tablet, Rfl: 3  •  spironolactone (ALDACTONE) 25 MG tablet, Take 1 tablet by mouth Daily., Disp: 90 tablet, Rfl: 3    HPI    Jennifer Dey is a 46 y.o. female who presents today for a 6 month follow up of coronary artery disease, chronic systolic heart failure, NSVT, paroxysmal SVT, and cardiac risk factors. Since last visit, patient's device interrogation reported two events of NSVT, one of which happened during the day causing dizziness and near-syncopal episode. She almost went to the ER for this. She walks about 3 hours at a time for exercise. She sleeps with one pillow. She smokes cigarettes more frequently than she did last visit. Patient denies chest pain, shortness of breath, palpitations, edema, and syncope.       The following portions of the patient's history were reviewed and updated as appropriate: allergies, current medications and problem list.    Pertinent positives as listed in the HPI.  All other systems reviewed are negative.         Vitals:    11/04/21 1452   BP: 98/62   BP Location: Right arm   Patient Position: Sitting   Pulse: 89   Weight: 92.1 kg (203 lb)   Height: 157.5 cm (62\")       Physical Exam:  General: Alert and oriented.  Neck: Jugular venous pressure is within normal limits. Carotids have normal upstrokes without bruits.   Cardiovascular: Heart has a nondisplaced focal PMI. Regular rate and rhythm. No murmur, gallop or rub.  Lungs: Clear, no rales or wheezes. Equal expansion is noted.   Extremities: Show no edema.  Skin: Warm and dry.  Neurologic: Nonfocal.     Diagnostic Data (reviewed with patient):    Lab Results   Component Value Date    CHLPL 271 (H) 04/14/2021    " TRIG 366 (H) 04/14/2021    HDL 27 (L) 04/14/2021     (H) 04/14/2021      Lab date: 4/14/2021  • CMP: Glu 117, BUN 10, Creat 0.7, eGFR >60, Na 142, K 4.7, Cl 102, CO2 27, Ca 10.0, Alk Phos 104, AST 15, ALT 14      Procedures     MANUAL DEVICE INTERROGATION, 11/4/2021, [employer] ICD:   RV pacing is 0%   P wave is 7.9 mV   R wave is 24.4 mV with a threshold of 0.6 V at 0.4 msec and an impedance of 565 ohms.  Battery voltage is 9 years  Events:  2 NSVT, 6 beats and 30 beats  Device updates: None          Assessment:    ICD-10-CM ICD-9-CM   1. NSVT (nonsustained ventricular tachycardia) ([facility])  I47.2 427.1   2. Coronary artery disease involving native coronary artery of native heart without angina pectoris  I25.10 414.01   3. Chronic systolic heart failure (HCC)  I50.22 428.22   4. Paroxysmal SVT (supraventricular tachycardia) ([facility])  I47.1 427.0   5. Hyperlipidemia LDL goal <70  E78.5 272.4         Plan:  1. If patient experiences additional NSVT events we can add antiarrhythmic.   2. FLP, CMP, and Mg to be done today.   3. Continue on aspirin 81 mg for antiplatelet therapy and nitroglycerin 0.4 mg as needed for angina.   4. Continue on lisinopril 5 mg and metoprolol 25 mg for hypertension.   5. Continue on spironolactone 25 mg for systolic heart failure.   6. Continue on pravastatin 20 mg daily for hyperlipidemia.   7. Continue all other current medications.  8. F/up in 6 months, sooner if needed.    Scribed for Elen Boothe MD by Ying Quintanilla. 11/4/2021 15:04 EDT      I Elen Boothe MD personally performed the services described in this documentation as scribed by the above individual in my presence, and it is both accurate and complete.    Elen Boothe MD, [facility]

## 2021-11-04 ENCOUNTER — OFFICE VISIT (OUTPATIENT)
Dept: CARDIOLOGY | Facility: CLINIC | Age: 46
End: 2021-11-04

## 2021-11-04 ENCOUNTER — HOSPITAL ENCOUNTER (OUTPATIENT)
Facility: HOSPITAL | Age: 46
Discharge: HOME | End: 2021-11-04
Payer: COMMERCIAL

## 2021-11-04 VITALS
HEART RATE: 89 BPM | WEIGHT: 203 LBS | DIASTOLIC BLOOD PRESSURE: 62 MMHG | HEIGHT: 62 IN | SYSTOLIC BLOOD PRESSURE: 98 MMHG | BODY MASS INDEX: 37.36 KG/M2

## 2021-11-04 DIAGNOSIS — I47.29 NSVT (NONSUSTAINED VENTRICULAR TACHYCARDIA) (HCC): Primary | ICD-10-CM

## 2021-11-04 DIAGNOSIS — I25.10 CORONARY ARTERY DISEASE INVOLVING NATIVE CORONARY ARTERY OF NATIVE HEART WITHOUT ANGINA PECTORIS: ICD-10-CM

## 2021-11-04 DIAGNOSIS — E78.5 HYPERLIPIDEMIA LDL GOAL <70: ICD-10-CM

## 2021-11-04 DIAGNOSIS — I47.1 PAROXYSMAL SVT (SUPRAVENTRICULAR TACHYCARDIA) (HCC): ICD-10-CM

## 2021-11-04 DIAGNOSIS — I50.22 CHRONIC SYSTOLIC HEART FAILURE (HCC): ICD-10-CM

## 2021-11-04 LAB
A/G RATIO: 1.8 (ref 0.8–2)
ALBUMIN SERPL-MCNC: 4.4 G/DL (ref 3.4–4.8)
ALP BLD-CCNC: 106 U/L (ref 25–100)
ALT SERPL-CCNC: 14 U/L (ref 4–36)
ANION GAP SERPL CALCULATED.3IONS-SCNC: 9 MMOL/L (ref 3–16)
AST SERPL-CCNC: 16 U/L (ref 8–33)
BILIRUB SERPL-MCNC: <0.2 MG/DL (ref 0.3–1.2)
BUN BLDV-MCNC: 9 MG/DL (ref 6–20)
CALCIUM SERPL-MCNC: 9.5 MG/DL (ref 8.5–10.5)
CHLORIDE BLD-SCNC: 105 MMOL/L (ref 98–107)
CHOLESTEROL, TOTAL: 251 MG/DL (ref 0–200)
CO2: 20 MMOL/L (ref 20–30)
CREAT SERPL-MCNC: 0.6 MG/DL (ref 0.4–1.2)
GFR AFRICAN AMERICAN: >59
GFR NON-AFRICAN AMERICAN: >60
GLOBULIN: 2.5 G/DL
GLUCOSE BLD-MCNC: 121 MG/DL (ref 74–106)
HDLC SERPL-MCNC: 30 MG/DL (ref 40–60)
LDL CHOLESTEROL CALCULATED: 179 MG/DL
MAGNESIUM: 1.9 MG/DL (ref 1.7–2.4)
POTASSIUM SERPL-SCNC: 4.3 MMOL/L (ref 3.4–5.1)
SODIUM BLD-SCNC: 134 MMOL/L (ref 136–145)
TOTAL PROTEIN: 6.9 G/DL (ref 6.4–8.3)
TRIGL SERPL-MCNC: 212 MG/DL (ref 0–249)
VLDLC SERPL CALC-MCNC: 42 MG/DL

## 2021-11-04 PROCEDURE — 80061 LIPID PANEL: CPT

## 2021-11-04 PROCEDURE — 36415 COLL VENOUS BLD VENIPUNCTURE: CPT

## 2021-11-04 PROCEDURE — 80053 COMPREHEN METABOLIC PANEL: CPT

## 2021-11-04 PROCEDURE — 83735 ASSAY OF MAGNESIUM: CPT

## 2021-11-04 PROCEDURE — 93283 PRGRMG EVAL IMPLANTABLE DFB: CPT | Performed by: INTERNAL MEDICINE

## 2021-11-04 PROCEDURE — 99214 OFFICE O/P EST MOD 30 MIN: CPT | Performed by: INTERNAL MEDICINE

## 2021-11-04 RX ORDER — PRAVASTATIN SODIUM 20 MG
20 TABLET ORAL DAILY
Qty: 90 TABLET | Refills: 3 | Status: SHIPPED | OUTPATIENT
Start: 2021-11-04 | End: 2022-05-19

## 2021-11-04 RX ORDER — SPIRONOLACTONE 25 MG/1
25 TABLET ORAL DAILY
Qty: 90 TABLET | Refills: 3 | Status: SHIPPED | OUTPATIENT
Start: 2021-11-04 | End: 2022-11-04

## 2021-11-04 RX ORDER — LISINOPRIL 5 MG/1
5 TABLET ORAL DAILY
Qty: 90 TABLET | Refills: 3 | Status: SHIPPED | OUTPATIENT
Start: 2021-11-04

## 2021-11-04 RX ORDER — FUROSEMIDE 20 MG/1
20 TABLET ORAL DAILY
Qty: 90 TABLET | Refills: 3 | Status: SHIPPED | OUTPATIENT
Start: 2021-11-04 | End: 2022-11-11

## 2021-11-05 DIAGNOSIS — I25.10 CORONARY ARTERY DISEASE INVOLVING NATIVE CORONARY ARTERY OF NATIVE HEART WITHOUT ANGINA PECTORIS: ICD-10-CM

## 2021-11-05 DIAGNOSIS — I47.29 NSVT (NONSUSTAINED VENTRICULAR TACHYCARDIA) (HCC): ICD-10-CM

## 2021-11-05 PROCEDURE — 93296 REM INTERROG EVL PM/IDS: CPT | Performed by: STUDENT IN AN ORGANIZED HEALTH CARE EDUCATION/TRAINING PROGRAM

## 2021-11-05 PROCEDURE — 93295 DEV INTERROG REMOTE 1/2/MLT: CPT | Performed by: STUDENT IN AN ORGANIZED HEALTH CARE EDUCATION/TRAINING PROGRAM

## 2021-11-07 NOTE — PROGRESS NOTES
Labs are concerning for high cholesterol and borderline glucose levels. Labs were ordered by cardiology.  She needs an appointment for routine management at the primary care office.

## 2021-11-09 ENCOUNTER — TELEPHONE (OUTPATIENT)
Dept: INTERNAL MEDICINE | Facility: CLINIC | Age: 46
End: 2021-11-09

## 2021-11-09 ENCOUNTER — TELEPHONE (OUTPATIENT)
Dept: CARDIOLOGY | Facility: CLINIC | Age: 46
End: 2021-11-09

## 2021-11-09 DIAGNOSIS — I47.29 NSVT (NONSUSTAINED VENTRICULAR TACHYCARDIA) (HCC): Primary | ICD-10-CM

## 2021-11-09 RX ORDER — SOTALOL HYDROCHLORIDE 80 MG/1
80 TABLET ORAL EVERY 12 HOURS
Qty: 60 TABLET | Refills: 1 | Status: SHIPPED | OUTPATIENT
Start: 2021-11-09 | End: 2022-01-11

## 2021-11-09 NOTE — TELEPHONE ENCOUNTER
PT's daughter called back and we went over recommendations. She verbalized understanding. Med will be sent to pharmacy and they will go to [facility] Friday for the EKG. I will fax the order to main reg.

## 2021-11-09 NOTE — TELEPHONE ENCOUNTER
Lm on  to call back to go over recommendations from visit last week     Dr. Boothe reviewed device interrogation with Dr. Faith- recommends sotalol 80 mg every 12 hours, with repeat EKG 48-72 hours after starting.     Lm on emma's (daughter) vm to call me to go over these recommendations.

## 2021-11-12 ENCOUNTER — HOSPITAL ENCOUNTER (OUTPATIENT)
Facility: HOSPITAL | Age: 46
Discharge: HOME | End: 2021-11-12
Payer: COMMERCIAL

## 2021-11-12 PROCEDURE — 93005 ELECTROCARDIOGRAM TRACING: CPT

## 2021-11-16 ENCOUNTER — OUTSIDE FACILITY SERVICE (OUTPATIENT)
Dept: CARDIOLOGY | Facility: CLINIC | Age: 46
End: 2021-11-16

## 2021-11-16 PROCEDURE — 93010 ELECTROCARDIOGRAM REPORT: CPT | Performed by: INTERNAL MEDICINE

## 2021-11-18 RX ORDER — MAGNESIUM OXIDE 400 MG/1
400 TABLET ORAL DAILY
Qty: 90 TABLET | Refills: 1 | Status: SHIPPED | OUTPATIENT
Start: 2021-11-18 | End: 2023-02-23 | Stop reason: SDUPTHER

## 2021-11-24 ENCOUNTER — TELEPHONE (OUTPATIENT)
Dept: CARDIOLOGY | Facility: CLINIC | Age: 46
End: 2021-11-24

## 2021-11-24 NOTE — TELEPHONE ENCOUNTER
PT's daughter calls to report that pt had another syncopal episode today- with her device, not able to do a manual transmission. Not sure of the pt's BP at this time. Advised if any further episodes to report to the ER for further evaluation. We will f/u with transmission on Monday.

## 2021-12-08 ENCOUNTER — TELEPHONE (OUTPATIENT)
Dept: CARDIOLOGY | Facility: CLINIC | Age: 46
End: 2021-12-08

## 2021-12-08 RX ORDER — NITROGLYCERIN 0.4 MG/1
0.4 TABLET SUBLINGUAL
Qty: 25 TABLET | Refills: 11 | Status: SHIPPED | OUTPATIENT
Start: 2021-12-08 | End: 2023-03-15

## 2021-12-08 NOTE — TELEPHONE ENCOUNTER
Reviewed with Dr. Boothe and she recommends reducing sotalol to 1/2 tab (40 mg) BID    I called PT's daughter with the recommendation and she states the pt said the past few days she has been feeling pretty good, with no dizziness. They will remain on 80 mg BID and if dizziness returns, will reduce to 40 mg BID and let me know of the change so that we can document it

## 2021-12-08 NOTE — TELEPHONE ENCOUNTER
PT started Sotalol 80 mg BID for NSVT on device interrogation. She has been experiencing dizziness since starting the medication - BP running 118-120/80 per her daughter. I had Nicolette look at remote transmissions and she said there has been nothing on the transmissions. Jennifer says this is a different dizziness than what she had with the NSVT episodes. This dizziness started after starting sotalol.

## 2022-01-11 RX ORDER — SOTALOL HYDROCHLORIDE 80 MG/1
80 TABLET ORAL EVERY 12 HOURS
Qty: 180 TABLET | Refills: 1 | Status: SHIPPED | OUTPATIENT
Start: 2022-01-11 | End: 2022-05-24

## 2022-02-04 PROCEDURE — 93295 DEV INTERROG REMOTE 1/2/MLT: CPT | Performed by: INTERNAL MEDICINE

## 2022-02-04 PROCEDURE — 93296 REM INTERROG EVL PM/IDS: CPT | Performed by: INTERNAL MEDICINE

## 2022-02-18 RX ORDER — ASPIRIN 81 MG/1
TABLET, CHEWABLE ORAL
Qty: 90 TABLET | Refills: 2 | Status: SHIPPED | OUTPATIENT
Start: 2022-02-18 | End: 2023-01-06

## 2022-03-10 ENCOUNTER — HOSPITAL ENCOUNTER (EMERGENCY)
Facility: HOSPITAL | Age: 47
Discharge: HOME | End: 2022-03-10
Attending: FAMILY MEDICINE
Payer: COMMERCIAL

## 2022-03-10 VITALS
OXYGEN SATURATION: 97 % | TEMPERATURE: 98.1 F | HEIGHT: 60 IN | WEIGHT: 180 LBS | BODY MASS INDEX: 35.34 KG/M2 | SYSTOLIC BLOOD PRESSURE: 132 MMHG | DIASTOLIC BLOOD PRESSURE: 85 MMHG | HEART RATE: 92 BPM | RESPIRATION RATE: 18 BRPM

## 2022-03-10 DIAGNOSIS — K04.7 DENTAL ABSCESS: Primary | ICD-10-CM

## 2022-03-10 DIAGNOSIS — K02.9 PAIN DUE TO DENTAL CARIES: ICD-10-CM

## 2022-03-10 PROCEDURE — 6370000000 HC RX 637 (ALT 250 FOR IP): Performed by: FAMILY MEDICINE

## 2022-03-10 PROCEDURE — 99283 EMERGENCY DEPT VISIT LOW MDM: CPT

## 2022-03-10 RX ORDER — HYDROCODONE BITARTRATE AND ACETAMINOPHEN 5; 325 MG/1; MG/1
1 TABLET ORAL
Qty: 8 TABLET | Refills: 0 | Status: SHIPPED | OUTPATIENT
Start: 2022-03-10 | End: 2022-03-12

## 2022-03-10 RX ORDER — AMOXICILLIN 500 MG/1
500 CAPSULE ORAL 3 TIMES DAILY
Qty: 21 CAPSULE | Refills: 0 | Status: SHIPPED | OUTPATIENT
Start: 2022-03-10 | End: 2022-03-17

## 2022-03-10 RX ORDER — HYDROCODONE BITARTRATE AND ACETAMINOPHEN 7.5; 325 MG/1; MG/1
1 TABLET ORAL ONCE
Status: COMPLETED | OUTPATIENT
Start: 2022-03-10 | End: 2022-03-10

## 2022-03-10 RX ORDER — AMOXICILLIN 250 MG/1
250 CAPSULE ORAL ONCE
Status: COMPLETED | OUTPATIENT
Start: 2022-03-10 | End: 2022-03-10

## 2022-03-10 RX ORDER — NAPROXEN 500 MG/1
500 TABLET ORAL ONCE
Status: COMPLETED | OUTPATIENT
Start: 2022-03-10 | End: 2022-03-10

## 2022-03-10 RX ADMIN — NAPROXEN 500 MG: 500 TABLET ORAL at 21:17

## 2022-03-10 RX ADMIN — HYDROCODONE BITARTRATE AND ACETAMINOPHEN 1 TABLET: 7.5; 325 TABLET ORAL at 21:17

## 2022-03-10 RX ADMIN — AMOXICILLIN 250 MG: 250 CAPSULE ORAL at 21:17

## 2022-03-10 ASSESSMENT — PAIN SCALES - GENERAL
PAINLEVEL_OUTOF10: 10
PAINLEVEL_OUTOF10: 10

## 2022-03-10 ASSESSMENT — PAIN DESCRIPTION - PAIN TYPE: TYPE: ACUTE PAIN

## 2022-03-10 ASSESSMENT — ENCOUNTER SYMPTOMS: FACIAL SWELLING: 1

## 2022-03-10 ASSESSMENT — PAIN DESCRIPTION - LOCATION: LOCATION: TEETH

## 2022-03-11 NOTE — ED PROVIDER NOTES
[address]  eMERGENCY dEPARTMENT eNCOUnter      Pt Name: Arlene Ruth  MRN: [id number]  [place] 1975  Date of evaluation: 3/10/2022  Provider: Alfred Molina DO    CHIEF COMPLAINT       Chief Complaint   Patient presents with    Dental Pain         HISTORY OF PRESENT ILLNESS   (Location/Symptom, Timing/Onset, Context/Setting, Quality, Duration, Modifying Factors, Severity)  Note limiting factors. Arlene Ruth is a 48 y.o. female who presents to the emergency department for having dental pain. Pt states that she has chronic dental issues and all her \"teeth are bad\". She said that her left front canine area tooth started hurting and now with some swelling to right face beside her nose. Pain 10/10, sharp, aching, constant. No drainage. Pt tried to get into the dentist but wasn't able to. Nursing Notes were reviewed. REVIEW OF SYSTEMS    (2-9 systems for level 4, 10 or more forlevel 5)     Review of Systems   HENT: Positive for dental problem and facial swelling. All other systems reviewed and are negative.           PAST MEDICAL HISTORY     Past Medical History:   Diagnosis Date    CAD (coronary artery disease)     Depression     Heart attack ([address]     Hyperlipidemia     Hypertension          SURGICAL HISTORY       Past Surgical History:   Procedure Laterality Date    CARDIAC DEFIBRILLATOR PLACEMENT      CORONARY ANGIOPLASTY WITH STENT PLACEMENT      TUBAL LIGATION           CURRENT MEDICATIONS       Previous Medications    ACETAMINOPHEN (TYLENOL) 500 MG TABLET    Take 500 mg by mouth every 6 hours as needed for Pain    ASPIRIN 81 MG CHEWABLE TABLET    Take 81 mg by mouth daily    ATORVASTATIN (LIPITOR) 40 MG TABLET    Take 40 mg by mouth daily    CLOPIDOGREL (PLAVIX) 75 MG TABLET    Take 75 mg by mouth daily    FUROSEMIDE (LASIX) 20 MG TABLET    Take 20 mg by mouth daily    HYDROXYZINE (ATARAX) 25 MG TABLET    Take 25 mg by mouth 3 times daily as needed for Itching    METOPROLOL TARTRATE (LOPRESSOR) 25 MG TABLET    Take 25 mg by mouth 2 times daily    NITROGLYCERIN (NITROSTAT) 0.4 MG SL TABLET    Place 0.4 mg under the tongue every 5 minutes as needed for Chest pain up to max of 3 total doses. If no relief after 1 dose, call 911. SERTRALINE (ZOLOFT) 25 MG TABLET    Take 25 mg by mouth daily    SPIRONOLACTONE (ALDACTONE) 25 MG TABLET    Take 25 mg by mouth daily    TRIAMCINOLONE (KENALOG) 0.1 % CREAM    Apply topically 2 times daily. ALLERGIES     Patient has no known allergies. FAMILY HISTORY       Family History   Problem Relation Age of Onset    Cancer Mother     Heart Disease Mother           SOCIAL HISTORY       Social History     Socioeconomic History    Marital status:      Spouse name: None    Number of children: None    Years of education: None    Highest education level: None   Occupational History    None   Tobacco Use    Smoking status: Current Every Day Smoker     Packs/day: 0.50     Types: Cigarettes     Start date: 7/19/1986    Smokeless tobacco: Never Used   Substance and Sexual Activity    Alcohol use: No    Drug use: No    Sexual activity: None   Other Topics Concern    None   Social History Narrative    None     Social Determinants of Health     Financial Resource Strain:     Difficulty of Paying Living Expenses: Not on file   Food Insecurity:     Worried About Running Out of Food in the Last Year: Not on file    Talia of Food in the Last Year: Not on file   Transportation Needs:     Lack of Transportation (Medical): Not on file    Lack of Transportation (Non-Medical):  Not on file   Physical Activity:     Days of Exercise per Week: Not on file    Minutes of Exercise per Session: Not on file   Stress:     Feeling of Stress : Not on file   Social Connections:     Frequency of Communication with Friends and Family: Not on file    Frequency of Social Gatherings with Friends and Family: Not General: Skin is warm and dry. Neurological:      Mental Status: She is alert and oriented to person, place, and time. Psychiatric:         Mood and Affect: Mood normal.         Behavior: Behavior normal.         DIAGNOSTIC RESULTS     EKG: All EKG's are interpreted by the Emergency Department Physician who either signs or Co-signs this chart in the absence of a cardiologist.    None    RADIOLOGY:   Non-plain film images such as CT, Ultrasound and MRI are read by the radiologist. Plain radiographic images are visualized andpreliminarily interpreted by the emergency physician with the below findings:    None    Interpretationper the Radiologist below, if available at the time of this note:    No orders to display         ED BEDSIDE ULTRASOUND:   Performed by ED Physician - none    LABS:  Labs Reviewed - No data to display    All other labs were within normal range or not returned as of this dictation. EMERGENCY DEPARTMENT COURSE and DIFFERENTIAL DIAGNOSIS/MDM:   Vitals:    Vitals:    03/10/22 1941   BP: 132/85   Pulse: 92   Resp: 18   Temp: 98.1 °F (36.7 °C)   TempSrc: Oral   SpO2: 97%   Weight: 180 lb (81.6 kg)   Height: 5' (1.524 m)           CRITICAL CARE TIME   Total Critical Care time was 0 minutes, excluding separatelyreportable procedures. There was a high probability ofclinically significant/life threatening deterioration in the patient's condition which required my urgent intervention. CONSULTS:  None    PROCEDURES:  None    PROGRESS NOTES:    Pt most likely with dental abscess. Pt with chronically bad teeth wore and eroded down to the gingiva/gum. Will give oral pain med here and NSAID. Will give antibiotics, Will give 2 days pain meds for home with antibiotics. Encouraged to call and see dentist.    FINAL IMPRESSION      1. Dental abscess New Problem   2.  Pain due to dental caries New Problem         DISPOSITION/PLAN   DISPOSITION Decision To Discharge 03/10/2022 09:03:02 PM      PATIENT REFERRED TO:    Follow up with your primary care doctor to for any further acute pain med needs if unable to get into dentist. Gave 48 hours of pain meds and antibiotics for next 1 week. Call around to local dentists in area for treatment of dental problems          DISCHARGE MEDICATIONS:  New Prescriptions    AMOXICILLIN (AMOXIL) 500 MG CAPSULE    Take 1 capsule by mouth 3 times daily for 7 days    HYDROCODONE-ACETAMINOPHEN (NORCO) 5-325 MG PER TABLET    Take 1 tablet by mouth every 4-6 hours as needed for Pain for up to 2 days. Intended supply: 3 days.  Take lowest dose possible to manage pain       (Please note that portions of this note were completed with a voice recognition program.  Efforts were made to edit the dictations but occasionallywords are mis-transcribed.)    Diana Jensen DO (electronically signed)  Attending Emergency Physician          Diana Jensen DO  03/10/22 [id number]

## 2022-03-11 NOTE — ED TRIAGE NOTES
Pt arrived to ER with dental pain. Pt states she is unable to get into her dentist and came here because the pain became unbearable.

## 2022-04-15 ENCOUNTER — HOSPITAL ENCOUNTER (OUTPATIENT)
Dept: MAMMOGRAPHY | Facility: HOSPITAL | Age: 47
Discharge: HOME | End: 2022-04-15
Payer: COMMERCIAL

## 2022-04-15 VITALS — HEIGHT: 60 IN | WEIGHT: 195 LBS | BODY MASS INDEX: 38.28 KG/M2

## 2022-04-15 DIAGNOSIS — Z12.31 BREAST CANCER SCREENING BY MAMMOGRAM: ICD-10-CM

## 2022-04-15 PROCEDURE — 77063 BREAST TOMOSYNTHESIS BI: CPT

## 2022-05-06 PROCEDURE — 93296 REM INTERROG EVL PM/IDS: CPT | Performed by: INTERNAL MEDICINE

## 2022-05-06 PROCEDURE — 93295 DEV INTERROG REMOTE 1/2/MLT: CPT | Performed by: INTERNAL MEDICINE

## 2022-05-10 ENCOUNTER — HOSPITAL ENCOUNTER (EMERGENCY)
Facility: HOSPITAL | Age: 47
Discharge: HOME | End: 2022-05-10
Attending: EMERGENCY MEDICINE
Payer: COMMERCIAL

## 2022-05-10 VITALS
SYSTOLIC BLOOD PRESSURE: 116 MMHG | TEMPERATURE: 98.6 F | RESPIRATION RATE: 16 BRPM | HEIGHT: 61 IN | BODY MASS INDEX: 33.99 KG/M2 | HEART RATE: 84 BPM | OXYGEN SATURATION: 98 % | WEIGHT: 180 LBS | DIASTOLIC BLOOD PRESSURE: 80 MMHG

## 2022-05-10 DIAGNOSIS — H65.92 LEFT NON-SUPPURATIVE OTITIS MEDIA: Primary | ICD-10-CM

## 2022-05-10 PROCEDURE — 96372 THER/PROPH/DIAG INJ SC/IM: CPT

## 2022-05-10 PROCEDURE — 6360000002 HC RX W HCPCS: Performed by: EMERGENCY MEDICINE

## 2022-05-10 PROCEDURE — 6370000000 HC RX 637 (ALT 250 FOR IP): Performed by: EMERGENCY MEDICINE

## 2022-05-10 PROCEDURE — 99284 EMERGENCY DEPT VISIT MOD MDM: CPT

## 2022-05-10 RX ORDER — AMOXICILLIN 500 MG/1
500 CAPSULE ORAL 3 TIMES DAILY
Qty: 30 CAPSULE | Refills: 0 | Status: SHIPPED | OUTPATIENT
Start: 2022-05-10 | End: 2022-05-20

## 2022-05-10 RX ORDER — AMOXICILLIN 500 MG/1
500 CAPSULE ORAL ONCE
Status: COMPLETED | OUTPATIENT
Start: 2022-05-10 | End: 2022-05-10

## 2022-05-10 RX ORDER — KETOROLAC TROMETHAMINE 10 MG/1
10 TABLET, FILM COATED ORAL EVERY 6 HOURS PRN
Qty: 20 TABLET | Refills: 0 | Status: SHIPPED | OUTPATIENT
Start: 2022-05-10 | End: 2023-05-10

## 2022-05-10 RX ORDER — KETOROLAC TROMETHAMINE 30 MG/ML
15 INJECTION, SOLUTION INTRAMUSCULAR; INTRAVENOUS ONCE
Status: COMPLETED | OUTPATIENT
Start: 2022-05-10 | End: 2022-05-10

## 2022-05-10 RX ADMIN — AMOXICILLIN 500 MG: 500 CAPSULE ORAL at 10:31

## 2022-05-10 RX ADMIN — KETOROLAC TROMETHAMINE 15 MG: 30 INJECTION, SOLUTION INTRAMUSCULAR at 10:32

## 2022-05-10 ASSESSMENT — PAIN DESCRIPTION - FREQUENCY: FREQUENCY: INTERMITTENT

## 2022-05-10 ASSESSMENT — PAIN DESCRIPTION - PAIN TYPE
TYPE: ACUTE PAIN
TYPE: ACUTE PAIN

## 2022-05-10 ASSESSMENT — PAIN DESCRIPTION - LOCATION
LOCATION: EAR
LOCATION: EAR

## 2022-05-10 ASSESSMENT — PAIN DESCRIPTION - ORIENTATION
ORIENTATION: LEFT
ORIENTATION: LEFT

## 2022-05-10 ASSESSMENT — PAIN DESCRIPTION - DESCRIPTORS
DESCRIPTORS: ACHING
DESCRIPTORS: SHARP

## 2022-05-10 ASSESSMENT — PAIN SCALES - GENERAL
PAINLEVEL_OUTOF10: 8
PAINLEVEL_OUTOF10: 10
PAINLEVEL_OUTOF10: 10

## 2022-05-10 ASSESSMENT — PAIN - FUNCTIONAL ASSESSMENT
PAIN_FUNCTIONAL_ASSESSMENT: 0-10

## 2022-05-10 NOTE — ED PROVIDER NOTES
Karolyn Santos [address] [facility] ENCOUNTER      Pt Name: Alisson Gallardo  MRN: [id number]  YOB: 1975  Date of evaluation: 5/10/2022  Provider: Liban Abreu MD    [address] [place] [address]       Chief Complaint   Patient presents with    Otalgia         HISTORY OF PRESENT ILLNESS  (Location/Symptom, Timing/Onset, Context/Setting, Quality, Duration, Modifying Factors, Severity.)   Alisson Gallardo is a 48 y.o. female who presents to the emergency department complaining of sharp left ear pain rating to down her jaw this woke her from sleep about 7 hours ago nothing has made it better it is worse when she burps or swallows she has not had any drainage from the ears she has not had a fever she has noticed that her hearing is somewhat diminished in that ear. Nursing notes were reviewed. REVIEW OFSYSTEMS    (2-9 systems for level 4, 10 or more for level 5)   ROS:  General:  No fevers, no chills, no weakness  Cardiovascular:  No chest pain, no palpitations  Respiratory:  No shortness of breath, no cough, no wheezing  Gastrointestinal:  No pain, no nausea, no vomiting, no diarrhea  Musculoskeletal:  No muscle pain, no joint pain  Skin:  No rash, no easy bruising  Neurologic:  No speech problems, no headache, no extremity weakness  Psychiatric:  No anxiety  Genitourinary:  No dysuria, no hematuria    Except as noted above the remainder of the review of systems was reviewed and negative.        PAST MEDICAL HISTORY     Past Medical History:   Diagnosis Date    CAD (coronary artery disease)     Depression     Heart attack ([facility] [address]     Hyperlipidemia     Hypertension          SURGICAL HISTORY       Past Surgical History:   Procedure Laterality Date    CARDIAC DEFIBRILLATOR PLACEMENT      CORONARY ANGIOPLASTY WITH STENT PLACEMENT      TUBAL LIGATION           CURRENT MEDICATIONS       Previous Medications    ACETAMINOPHEN (TYLENOL) 500 MG TABLET    Take 500 mg by mouth every 6 hours as needed for Pain    ASPIRIN 81 MG CHEWABLE TABLET    Take 81 mg by mouth daily    ATORVASTATIN (LIPITOR) 40 MG TABLET    Take 40 mg by mouth daily    CLOPIDOGREL (PLAVIX) 75 MG TABLET    Take 75 mg by mouth daily    FUROSEMIDE (LASIX) 20 MG TABLET    Take 20 mg by mouth daily    HYDROXYZINE (ATARAX) 25 MG TABLET    Take 25 mg by mouth 3 times daily as needed for Itching    METOPROLOL TARTRATE (LOPRESSOR) 25 MG TABLET    Take 25 mg by mouth 2 times daily    NITROGLYCERIN (NITROSTAT) 0.4 MG SL TABLET    Place 0.4 mg under the tongue every 5 minutes as needed for Chest pain up to max of 3 total doses. If no relief after 1 dose, call 911. SPIRONOLACTONE (ALDACTONE) 25 MG TABLET    Take 25 mg by mouth daily    TRIAMCINOLONE (KENALOG) 0.1 % CREAM    Apply topically 2 times daily. ALLERGIES     Patient has no known allergies.     FAMILY HISTORY       Family History   Problem Relation Age of Onset    Cancer Mother     Heart Disease Mother     Breast Cancer Mother     Breast Cancer Maternal Aunt     Breast Cancer Maternal Aunt     Breast Cancer Maternal Aunt     Breast Cancer Maternal Cousin           SOCIAL HISTORY       Social History     Socioeconomic History    Marital status:      Spouse name: None    Number of children: None    Years of education: None    Highest education level: None   Occupational History    None   Tobacco Use    Smoking status: Current Every Day Smoker     Packs/day: 0.50     Types: Cigarettes     Start date: 7/19/1986    Smokeless tobacco: Never Used   Substance and Sexual Activity    Alcohol use: No    Drug use: No    Sexual activity: None   Other Topics Concern    None   Social History Narrative    None     Social Determinants of Health     Financial Resource Strain:     Difficulty of Paying Living Expenses: Not on file   Food Insecurity:     Worried About Running Out of Food in the Last Year: Not on file    Talia of Food in the Last Year: Not on file   Transportation Needs:     Lack of Transportation (Medical): Not on file    Lack of Transportation (Non-Medical): Not on file   Physical Activity:     Days of Exercise per Week: Not on file    Minutes of Exercise per Session: Not on file   Stress:     Feeling of Stress : Not on file   Social Connections:     Frequency of Communication with Friends and Family: Not on file    Frequency of Social Gatherings with Friends and Family: Not on file    Attends Jainism Services: Not on file    Active Member of [address] [place] [address] [employer] or Organizations: Not on file    Attends Club or Organization Meetings: Not on file    Marital Status: Not on file   Intimate Partner Violence:     Fear of Current or Ex-Partner: Not on file    Emotionally Abused: Not on file    Physically Abused: Not on file    Sexually Abused: Not on file   Housing Stability:     Unable to Pay for Housing in the Last Year: Not on file    Number of [place] in the Last Year: Not on file    Unstable Housing in the Last Year: Not on file         PHYSICAL EXAM    (up to 7 for level 4, 8 or more for level 5)     ED Triage Vitals [05/10/22 1017]   BP Temp Temp Source Pulse Resp SpO2 Height Weight   113/86 98.6 °F (37 °C) Oral 84 16 97 % 5' 1\" (1.549 m) 180 lb (81.6 kg)       Physical Exam  General :Patient is awake, alert, oriented, in no acute distress, nontoxic appearing  HEENT: Pupils are equally round and reactive to light, EOMI, conjunctivae clear. Left tympanic membrane is erythematous and maneuvering the ear canal causes pain. Right ear impacted with cerumen. Neck: Neck is supple, full range of motion, trachea midline    Musculoskeletal: 5 out of 5 strength in all 4 extremities. No focal muscle deficits are appreciated  Neuro: Motor intact, sensory intact, level of consciousness is normal,Dermatology: Skin is warm and dry  Psych: Mentation is grossly normal, cognition is grossly normal. Affect is appropriate.       DIAGNOSTIC RESULTS     EKG: All EKG's are interpreted by the Emergency Department Physician who either signs or Co-signs this chart in the [address] a cardiologist.        RADIOLOGY:   Non-plain film images such as CT, Ultrasound and MRI are read by the radiologist. Plain radiographic images are visualized and preliminarily interpreted by the emergency physician with the below findings:      ? Radiologist's Report Reviewed:  No orders to display         ED BEDSIDE ULTRASOUND:   Performed by ED Physician - none    LABS:    I have reviewed and interpreted all of the currently available lab results from this visit (ifapplicable):  No results found for this visit on 05/10/22. All other labs were within normal range or not returned as of this dictation. EMERGENCY DEPARTMENT COURSE and DIFFERENTIAL DIAGNOSIS/MDM:   Vitals:    Vitals:    05/10/22 1017   BP: 113/86   Pulse: 84   Resp: 16   Temp: 98.6 °F (37 °C)   TempSrc: Oral   SpO2: 97%   Weight: 180 lb (81.6 kg)   Height: 5' 1\" (1.549 m)       MEDICATIONS ADMINISTERED IN ED:  Medications   amoxicillin (AMOXIL) capsule 500 mg (500 mg Oral Given 5/10/22 1031)   ketorolac (TORADOL) injection 15 mg (15 mg IntraMUSCular Given 5/10/22 1032)       Patient is stable we will start her on amoxicillin for the otitis media and Toradol for couple of days for pain she will follow-up with her primary care in the next 2 or 3 days. The patient will follow-up with their PCP in 1-2 days for reevaluation. If the patient or family members have anyfurther concerns or any worsening symptoms they will return to the ED for reevaluation. CONSULTS:  None    PROCEDURES:  Procedures    CRITICAL CARE TIME    Total Critical Care time was 0 minutes, excluding separately reportable procedures. There was a high probability of clinically significant/life threatening deterioration in the patient's condition which required my urgent intervention. FINAL IMPRESSION      1.  Left non-suppurative otitis media New Problem         DISPOSITION/PLAN   DISPOSITION Decision To Discharge 05/10/2022 10:31:12 AM  Stable discharge to home    PATIENT REFERRED TO:  Linsey Corral, [address]   [phone]    Schedule an appointment as soon as possible for a visit in 3 days        DISCHARGE MEDICATIONS:  New Prescriptions    AMOXICILLIN (AMOXIL) 500 MG CAPSULE    Take 1 capsule by mouth 3 times daily for 10 days    KETOROLAC (TORADOL) 10 MG TABLET    Take 1 tablet by mouth every 6 hours as needed for Pain       Comment: Please note this report has been produced using speech recognition software and may contain errorsrelated to that system including errors in grammar, punctuation, and spelling, as well as words and phrases that may be inappropriate. If there are any questions or concerns please feel free to contact the dictating providerfor clarification.     Larissa Carreon MD  Attending Emergency Physician              Larissa Carreon MD  05/10/22 0621

## 2022-05-10 NOTE — ED NOTES
Reviewed discharge plan with Alisson Gallardo. Encouraged her to f/u with ABILIO Lutz and she understood. NAD noted on discharge, gait steady. Reviewed discharge prescription for:     Current Discharge Medication List      START taking these medications    Details   amoxicillin (AMOXIL) 500 MG capsule Take 1 capsule by mouth 3 times daily for 10 days  Qty: 30 capsule, Refills: 0      ketorolac (TORADOL) 10 MG tablet Take 1 tablet by mouth every 6 hours as needed for Pain  Qty: 20 tablet, Refills: 0             Madeline Latanya states understanding of how and when to take medications.       Electronically signed by Anselmo Fraser, RN on 5/10/2022 at [address] [place] [address] S, RN  05/10/22 8139

## 2022-05-18 NOTE — PROGRESS NOTES
[facility] Cardiology    Patient ID: Jennifer Dey is a 46 y.o. female.  : 1975   Contact: [phone]    Encounter date: 2022    PCP: Trang Kennedy APRN      Chief complaint:   Chief Complaint   Patient presents with   • Coronary Artery Disease     Problem List:  1. Coronary artery disease:  a. Acute STEMI, 2018.  b. LHC, 2018: Successful PTCA/stent placement ×2 in the proximal and mid LAD using overlapping 2.5 x 18 mm Yvrose AUBREY postdilated. Noncritical disease in the LCx and RCA. EF 25% and anterior akinesis with apical dyskinesis.  2. Ischemic cardiomyopathy/Chronic systolic heart failure:  a. Echo, 2018: EF 20-25%. Trace-to-mild MR. Trace TR. No evidence of LV apical thrombus  b. LifeVest at discharge.  c. Echo, 2019: EF 30-35%. Anteroseptal akinesis.  d. ICD placement, 2019, Dr. Hernandez: Insertion of [employer] VDI implantable cardioverter defibrillator for primary prevention of SCD.  3. Nonsustained ventricular tachycardia  a. Noted on device interrogation 2020.  4. SVT  5. Hyperlipidemia.  6. Tobacco abuse, 2 PPD now down to 4-5 cigs/d.  7. Low back pain.    Allergies   Allergen Reactions   • Atorvastatin Myalgia   • Rosuvastatin Myalgia       Current Medications:    Current Outpatient Medications:   •  acetaminophen (TYLENOL) 500 MG tablet, Take 1,000 mg by mouth Every 6 (Six) Hours As Needed for Mild Pain ., Disp: , Rfl:   •  aspirin 81 MG chewable tablet, CHEW AND SWALLOW ONE (1) TABLET DAILY, Disp: 90 tablet, Rfl: 2  •  furosemide (LASIX) 20 MG tablet, Take 1 tablet by mouth Daily., Disp: 90 tablet, Rfl: 3  •  lisinopril (PRINIVIL,ZESTRIL) 5 MG tablet, Take 1 tablet by mouth Daily., Disp: 90 tablet, Rfl: 3  •  magnesium oxide (MAG-OX) 400 MG tablet, Take 1 tablet by mouth Daily., Disp: 90 tablet, Rfl: 1  •  metoprolol tartrate (LOPRESSOR) 25 MG tablet, TAKE 1/2 TABLET BY MOUTH TWICE DAILY, Disp: 90 tablet, Rfl: 3  •   "nitroglycerin (NITROSTAT) 0.4 MG SL tablet, Place 1 tablet under the tongue Every 5 (Five) Minutes As Needed for Chest Pain. Take no more than 3 doses in 15 minutes., Disp: 25 tablet, Rfl: 11  •  pravastatin (PRAVACHOL) 80 MG tablet, Take 80 mg by mouth Daily., Disp: , Rfl:   •  sotalol (BETAPACE) 80 MG tablet, Take 1 tablet by mouth Every 12 (Twelve) Hours., Disp: 180 tablet, Rfl: 1  •  spironolactone (ALDACTONE) 25 MG tablet, Take 1 tablet by mouth Daily., Disp: 90 tablet, Rfl: 3  •  vitamin B-12 (CYANOCOBALAMIN) 1000 MCG tablet, Take 1,000 mcg by mouth Daily., Disp: , Rfl:   •  Vitamin D, Cholecalciferol, (CHOLECALCIFEROL) 10 MCG (400 UNIT) tablet, Take 400 Units by mouth Daily., Disp: , Rfl:     HPI    Jennifer Dey is a 46 y.o. female who presents today for a 6 month follow up of nonsustained ventricular tachycardia, coronary artery disease, chronic systolic heart failure, paroxysmal SVT, and cardiac risk factors. Since last visit, patient has done well overall.  She denies chest pain, PND, orthopnea, edema, SOA/LUA. BP controlled. She mows the lawn but does not have an exercise routine.       The following portions of the patient's history were reviewed and updated as appropriate: allergies, current medications and problem list.    Pertinent positives as listed in the HPI.  All other systems reviewed are negative.         Vitals:    05/19/22 0954   BP: 118/88   BP Location: Right arm   Patient Position: Sitting   Pulse: 63   SpO2: 97%   Weight: 89.8 kg (198 lb)   Height: 154.9 cm (61\")       Physical Exam:  General: Alert and oriented.  Neck: Jugular venous pressure is within normal limits. Carotids have normal upstrokes without bruits.   Cardiovascular: Heart has a nondisplaced focal PMI. Regular rate and rhythm. No murmur, gallop or rub.  Lungs: Clear, no rales or wheezes. Equal expansion is noted.   Extremities: Show no edema.  Skin: Warm and dry.  Neurologic: Nonfocal.     Diagnostic Data (reviewed " with patient):    Lab Results   Component Value Date    CHLPL 251 (H) 11/04/2021    TRIG 212 11/04/2021    HDL 30 (L) 11/04/2021     (H) 11/04/2021      Device check 5/19/22: RV 0%< normal threshold and impedance, battery life 87%, 1 NSVT (7 beats).       ECG 12 Lead    Date/Time: 5/19/2022 3:46 PM  Performed by: Radha Culver APRN  Authorized by: Radha Culver APRN   Previous ECG: no previous ECG available  Rhythm: sinus rhythm  BPM: 63                Assessment:    ICD-10-CM ICD-9-CM   1. NSVT (nonsustained ventricular tachycardia) ([facility])  I47.2 427.1   2. Coronary artery disease involving native coronary artery of native heart without angina pectoris  I25.10 414.01   3. Chronic systolic heart failure (H[facility])  I50.22 428.22   4. Paroxysmal SVT (supraventricular tachycardia) ([facility])  I47.1 427.0   5. Hyperlipidemia LDL goal <70  E78.5 272.4         Plan:  1. Patient was counseled to begin aerobic exercise 30 min per day for at least 4 days per week.   2. Continue on aspirin 81 mg for antiplatelet therapy.   3. Continue on Lasix 20 mg daily for fluid retention.   4. Continue on lisinopril 5 mg daily for hypertension. .   5. Continue on metoprolol 25 mg 1/2 tablet BID for rate control and hypertension.   6. Continue on nitroglycerin 0.4 mg as needed for angina.  7. Continue on pravastatin 20 mg daily for hyperlipidemia.   8. Continue on sotalol 80 mg for rhythm control.   9. Continue on spironolactone 25 mg daily for fluid retention and hypertension.   10. Continue all other current medications.  11. F/up in 6 months, sooner if needed.      Electronically signed by PATRICIA Hodgson, 05/19/22, 10:57 AM EDT.

## 2022-05-19 ENCOUNTER — HOSPITAL ENCOUNTER (OUTPATIENT)
Facility: HOSPITAL | Age: 47
Discharge: HOME | End: 2022-05-19
Payer: COMMERCIAL

## 2022-05-19 ENCOUNTER — OFFICE VISIT (OUTPATIENT)
Dept: CARDIOLOGY | Facility: CLINIC | Age: 47
End: 2022-05-19

## 2022-05-19 VITALS
BODY MASS INDEX: 37.38 KG/M2 | SYSTOLIC BLOOD PRESSURE: 118 MMHG | HEART RATE: 63 BPM | DIASTOLIC BLOOD PRESSURE: 88 MMHG | WEIGHT: 198 LBS | HEIGHT: 61 IN | OXYGEN SATURATION: 97 %

## 2022-05-19 DIAGNOSIS — I50.22 CHRONIC SYSTOLIC HEART FAILURE: ICD-10-CM

## 2022-05-19 DIAGNOSIS — E78.5 HYPERLIPIDEMIA LDL GOAL <70: ICD-10-CM

## 2022-05-19 DIAGNOSIS — I25.10 CORONARY ARTERY DISEASE INVOLVING NATIVE CORONARY ARTERY OF NATIVE HEART WITHOUT ANGINA PECTORIS: ICD-10-CM

## 2022-05-19 DIAGNOSIS — I47.29 NSVT (NONSUSTAINED VENTRICULAR TACHYCARDIA): Primary | ICD-10-CM

## 2022-05-19 DIAGNOSIS — I47.1 PAROXYSMAL SVT (SUPRAVENTRICULAR TACHYCARDIA): ICD-10-CM

## 2022-05-19 PROCEDURE — 99214 OFFICE O/P EST MOD 30 MIN: CPT | Performed by: NURSE PRACTITIONER

## 2022-05-19 PROCEDURE — 93000 ELECTROCARDIOGRAM COMPLETE: CPT | Performed by: NURSE PRACTITIONER

## 2022-05-19 PROCEDURE — 93005 ELECTROCARDIOGRAM TRACING: CPT

## 2022-05-19 PROCEDURE — 93283 PRGRMG EVAL IMPLANTABLE DFB: CPT | Performed by: NURSE PRACTITIONER

## 2022-05-19 RX ORDER — PRAVASTATIN SODIUM 20 MG
20 TABLET ORAL DAILY
COMMUNITY
End: 2023-01-25

## 2022-05-19 RX ORDER — LANOLIN ALCOHOL/MO/W.PET/CERES
1000 CREAM (GRAM) TOPICAL DAILY
COMMUNITY

## 2022-05-19 RX ORDER — ERGOCALCIFEROL (VITAMIN D2) 10 MCG
400 TABLET ORAL DAILY
COMMUNITY

## 2022-05-24 RX ORDER — LANOLIN ALCOHOL/MO/W.PET/CERES
CREAM (GRAM) TOPICAL
Qty: 90 TABLET | Refills: 1 | Status: SHIPPED | OUTPATIENT
Start: 2022-05-24 | End: 2022-12-01

## 2022-05-24 RX ORDER — SOTALOL HYDROCHLORIDE 80 MG/1
TABLET ORAL
Qty: 180 TABLET | Refills: 1 | Status: SHIPPED | OUTPATIENT
Start: 2022-05-24 | End: 2022-09-07

## 2022-07-28 ENCOUNTER — TELEPHONE (OUTPATIENT)
Dept: CARDIOLOGY | Facility: CLINIC | Age: 47
End: 2022-07-28

## 2022-07-28 NOTE — TELEPHONE ENCOUNTER
Pt's daughter called to report the pt experienced a tingling over the device site at approximately 3 AM 7/26/2022. Pt's daughter wants to know if anything is seen on remote web site communication.    Pt has a [employer] device, manual transmission not possible. Last communication between pt's device & [employer] web site was today, 7/28/2022 @ 1:20 AM-Normal device function, no alerts, no arrhythmias. Notified daughter of the above information-she verbalized understanding & appreciation.

## 2022-08-31 ENCOUNTER — TELEPHONE (OUTPATIENT)
Dept: CARDIOLOGY | Facility: CLINIC | Age: 47
End: 2022-08-31

## 2022-08-31 NOTE — TELEPHONE ENCOUNTER
Pt daughter mendez yesterday at 341- stated PCP increased her pravastatin to 80 mg but cannot tolerate due to myalgia. Wanted to know if we recommended a fish oil supplement she could include and stay on pravastatin 20 mg. Returned call, no answer. LVMARY stating per [facility] SKIP 5/2022 instructed to continue pravastatin 20 mg. Advised she may continue this dose and we will want another lipid panel before her next appt with [facility] 12/1. Will await pt return call if further questions.

## 2022-09-07 RX ORDER — SOTALOL HYDROCHLORIDE 80 MG/1
TABLET ORAL
Qty: 180 TABLET | Refills: 0 | Status: SHIPPED | OUTPATIENT
Start: 2022-09-07 | End: 2023-02-23 | Stop reason: SDUPTHER

## 2022-11-04 PROCEDURE — 93296 REM INTERROG EVL PM/IDS: CPT | Performed by: INTERNAL MEDICINE

## 2022-11-04 PROCEDURE — 93295 DEV INTERROG REMOTE 1/2/MLT: CPT | Performed by: INTERNAL MEDICINE

## 2022-11-04 RX ORDER — SPIRONOLACTONE 25 MG/1
TABLET ORAL
Qty: 90 TABLET | Refills: 0 | Status: SHIPPED | OUTPATIENT
Start: 2022-11-04 | End: 2023-01-25

## 2022-11-11 RX ORDER — FUROSEMIDE 20 MG/1
20 TABLET ORAL DAILY
Qty: 90 TABLET | Refills: 0 | Status: SHIPPED | OUTPATIENT
Start: 2022-11-11 | End: 2023-02-23 | Stop reason: SDUPTHER

## 2022-12-01 ENCOUNTER — OFFICE VISIT (OUTPATIENT)
Dept: CARDIOLOGY | Facility: CLINIC | Age: 47
End: 2022-12-01

## 2022-12-01 ENCOUNTER — HOSPITAL ENCOUNTER (OUTPATIENT)
Facility: HOSPITAL | Age: 47
Discharge: HOME | End: 2022-12-01
Payer: COMMERCIAL

## 2022-12-01 VITALS
WEIGHT: 192 LBS | HEIGHT: 61 IN | BODY MASS INDEX: 36.25 KG/M2 | HEART RATE: 80 BPM | OXYGEN SATURATION: 98 % | DIASTOLIC BLOOD PRESSURE: 84 MMHG | SYSTOLIC BLOOD PRESSURE: 128 MMHG

## 2022-12-01 DIAGNOSIS — I47.1 PAROXYSMAL SVT (SUPRAVENTRICULAR TACHYCARDIA): ICD-10-CM

## 2022-12-01 DIAGNOSIS — I47.29 NSVT (NONSUSTAINED VENTRICULAR TACHYCARDIA): Primary | ICD-10-CM

## 2022-12-01 DIAGNOSIS — I25.5 ISCHEMIC CARDIOMYOPATHY: ICD-10-CM

## 2022-12-01 DIAGNOSIS — I50.22 CHRONIC SYSTOLIC HEART FAILURE: ICD-10-CM

## 2022-12-01 DIAGNOSIS — E78.5 HYPERLIPIDEMIA LDL GOAL <70: ICD-10-CM

## 2022-12-01 DIAGNOSIS — I25.10 CORONARY ARTERY DISEASE INVOLVING NATIVE CORONARY ARTERY OF NATIVE HEART WITHOUT ANGINA PECTORIS: ICD-10-CM

## 2022-12-01 PROCEDURE — 93005 ELECTROCARDIOGRAM TRACING: CPT

## 2022-12-01 PROCEDURE — 93282 PRGRMG EVAL IMPLANTABLE DFB: CPT | Performed by: INTERNAL MEDICINE

## 2022-12-01 PROCEDURE — 99214 OFFICE O/P EST MOD 30 MIN: CPT | Performed by: INTERNAL MEDICINE

## 2022-12-01 PROCEDURE — 93000 ELECTROCARDIOGRAM COMPLETE: CPT | Performed by: INTERNAL MEDICINE

## 2022-12-06 RX ORDER — LANOLIN ALCOHOL/MO/W.PET/CERES
CREAM (GRAM) TOPICAL
Qty: 90 TABLET | Refills: 0 | Status: SHIPPED | OUTPATIENT
Start: 2022-12-06

## 2023-01-06 RX ORDER — ASPIRIN 81 MG/1
TABLET, CHEWABLE ORAL
Qty: 90 TABLET | Refills: 2 | Status: SHIPPED | OUTPATIENT
Start: 2023-01-06

## 2023-01-25 RX ORDER — PRAVASTATIN SODIUM 20 MG
TABLET ORAL
Qty: 90 TABLET | Refills: 0 | Status: SHIPPED | OUTPATIENT
Start: 2023-01-25 | End: 2023-02-23 | Stop reason: SDUPTHER

## 2023-01-25 RX ORDER — SPIRONOLACTONE 25 MG/1
TABLET ORAL
Qty: 90 TABLET | Refills: 0 | Status: SHIPPED | OUTPATIENT
Start: 2023-01-25 | End: 2023-02-23 | Stop reason: SDUPTHER

## 2023-02-08 RX ORDER — FUROSEMIDE 20 MG/1
TABLET ORAL
Qty: 90 TABLET | Refills: 0 | OUTPATIENT
Start: 2023-02-08

## 2023-02-21 RX ORDER — SPIRONOLACTONE 25 MG/1
TABLET ORAL
Qty: 90 TABLET | Refills: 0 | OUTPATIENT
Start: 2023-02-21

## 2023-02-21 RX ORDER — SOTALOL HYDROCHLORIDE 80 MG/1
TABLET ORAL
Qty: 180 TABLET | Refills: 0 | OUTPATIENT
Start: 2023-02-21

## 2023-02-21 RX ORDER — ASPIRIN 81 MG/1
TABLET, CHEWABLE ORAL
Qty: 90 TABLET | Refills: 2 | OUTPATIENT
Start: 2023-02-21

## 2023-02-21 RX ORDER — FUROSEMIDE 20 MG/1
TABLET ORAL
Qty: 90 TABLET | Refills: 0 | OUTPATIENT
Start: 2023-02-21

## 2023-02-21 RX ORDER — LANOLIN ALCOHOL/MO/W.PET/CERES
CREAM (GRAM) TOPICAL
Qty: 90 TABLET | Refills: 0 | OUTPATIENT
Start: 2023-02-21

## 2023-02-21 RX ORDER — PRAVASTATIN SODIUM 20 MG
TABLET ORAL
Qty: 90 TABLET | Refills: 0 | OUTPATIENT
Start: 2023-02-21

## 2023-02-23 RX ORDER — MAGNESIUM OXIDE 400 MG/1
400 TABLET ORAL DAILY
Qty: 90 TABLET | Refills: 0 | Status: SHIPPED | OUTPATIENT
Start: 2023-02-23

## 2023-02-23 RX ORDER — FUROSEMIDE 20 MG/1
20 TABLET ORAL DAILY
Qty: 90 TABLET | Refills: 0 | Status: SHIPPED | OUTPATIENT
Start: 2023-02-23

## 2023-02-23 RX ORDER — PRAVASTATIN SODIUM 20 MG
20 TABLET ORAL DAILY
Qty: 90 TABLET | Refills: 0 | Status: SHIPPED | OUTPATIENT
Start: 2023-02-23

## 2023-02-23 RX ORDER — SPIRONOLACTONE 25 MG/1
25 TABLET ORAL DAILY
Qty: 90 TABLET | Refills: 0 | Status: SHIPPED | OUTPATIENT
Start: 2023-02-23

## 2023-02-23 RX ORDER — SOTALOL HYDROCHLORIDE 80 MG/1
80 TABLET ORAL 2 TIMES DAILY
Qty: 180 TABLET | Refills: 0 | Status: SHIPPED | OUTPATIENT
Start: 2023-02-23

## 2023-03-15 RX ORDER — NITROGLYCERIN 0.4 MG/1
TABLET SUBLINGUAL
Qty: 25 TABLET | Refills: 11 | Status: SHIPPED | OUTPATIENT
Start: 2023-03-15

## 2023-03-21 ENCOUNTER — TELEPHONE (OUTPATIENT)
Dept: CARDIOLOGY | Facility: CLINIC | Age: 48
End: 2023-03-21
Payer: COMMERCIAL

## 2023-03-21 NOTE — TELEPHONE ENCOUNTER
Called daughter to make aware remote monitor needs to be upgraded.  Ordered new monitor.  Left message for a return call for instructions and verify address.

## 2023-04-20 ENCOUNTER — HOSPITAL ENCOUNTER (OUTPATIENT)
Dept: NON INVASIVE DIAGNOSTICS | Facility: HOSPITAL | Age: 48
Discharge: HOME | End: 2023-04-20
Payer: COMMERCIAL

## 2023-04-20 LAB
LV EF: 34 %
LVEF MODALITY: NORMAL

## 2023-04-20 PROCEDURE — 93017 CV STRESS TEST TRACING ONLY: CPT

## 2023-04-20 PROCEDURE — 3430000000 HC RX DIAGNOSTIC RADIOPHARMACEUTICAL: Performed by: INTERNAL MEDICINE

## 2023-04-20 PROCEDURE — 78452 HT MUSCLE IMAGE SPECT MULT: CPT

## 2023-04-20 PROCEDURE — A9500 TC99M SESTAMIBI: HCPCS | Performed by: INTERNAL MEDICINE

## 2023-04-20 RX ORDER — TETRAKIS(2-METHOXYISOBUTYLISOCYANIDE)COPPER(I) TETRAFLUOROBORATE 1 MG/ML
30 INJECTION, POWDER, LYOPHILIZED, FOR SOLUTION INTRAVENOUS
Status: COMPLETED | OUTPATIENT
Start: 2023-04-20 | End: 2023-04-20

## 2023-04-20 RX ORDER — TETRAKIS(2-METHOXYISOBUTYLISOCYANIDE)COPPER(I) TETRAFLUOROBORATE 1 MG/ML
10 INJECTION, POWDER, LYOPHILIZED, FOR SOLUTION INTRAVENOUS
Status: COMPLETED | OUTPATIENT
Start: 2023-04-20 | End: 2023-04-20

## 2023-04-20 RX ADMIN — TETRAKIS(2-METHOXYISOBUTYLISOCYANIDE)COPPER(I) TETRAFLUOROBORATE 30 MILLICURIE: 1 INJECTION, POWDER, LYOPHILIZED, FOR SOLUTION INTRAVENOUS at 10:20

## 2023-04-20 RX ADMIN — TETRAKIS(2-METHOXYISOBUTYLISOCYANIDE)COPPER(I) TETRAFLUOROBORATE 10 MILLICURIE: 1 INJECTION, POWDER, LYOPHILIZED, FOR SOLUTION INTRAVENOUS at 08:14

## 2023-04-21 ENCOUNTER — OUTSIDE FACILITY SERVICE (OUTPATIENT)
Dept: CARDIOLOGY | Facility: CLINIC | Age: 48
End: 2023-04-21
Payer: COMMERCIAL

## 2023-05-05 PROCEDURE — 93295 DEV INTERROG REMOTE 1/2/MLT: CPT | Performed by: STUDENT IN AN ORGANIZED HEALTH CARE EDUCATION/TRAINING PROGRAM

## 2023-05-05 PROCEDURE — 93296 REM INTERROG EVL PM/IDS: CPT | Performed by: STUDENT IN AN ORGANIZED HEALTH CARE EDUCATION/TRAINING PROGRAM

## 2023-05-12 RX ORDER — LANOLIN ALCOHOL/MO/W.PET/CERES
CREAM (GRAM) TOPICAL
Qty: 90 TABLET | Refills: 0 | Status: SHIPPED | OUTPATIENT
Start: 2023-05-12

## 2023-05-12 RX ORDER — FUROSEMIDE 20 MG/1
TABLET ORAL
Qty: 90 TABLET | Refills: 0 | Status: SHIPPED | OUTPATIENT
Start: 2023-05-12

## 2023-05-31 RX ORDER — SPIRONOLACTONE 25 MG/1
25 TABLET ORAL DAILY
Qty: 90 TABLET | Refills: 3 | Status: SHIPPED | OUTPATIENT
Start: 2023-05-31

## 2023-06-15 ENCOUNTER — OFFICE VISIT (OUTPATIENT)
Dept: CARDIOLOGY | Facility: CLINIC | Age: 48
End: 2023-06-15
Payer: COMMERCIAL

## 2023-06-15 VITALS
DIASTOLIC BLOOD PRESSURE: 58 MMHG | SYSTOLIC BLOOD PRESSURE: 114 MMHG | WEIGHT: 188 LBS | OXYGEN SATURATION: 97 % | HEART RATE: 72 BPM | BODY MASS INDEX: 36.91 KG/M2 | HEIGHT: 60 IN

## 2023-06-15 DIAGNOSIS — I50.22 CHRONIC SYSTOLIC HEART FAILURE: ICD-10-CM

## 2023-06-15 DIAGNOSIS — I47.29 NSVT (NONSUSTAINED VENTRICULAR TACHYCARDIA): Primary | ICD-10-CM

## 2023-06-15 DIAGNOSIS — I47.1 PAROXYSMAL SVT (SUPRAVENTRICULAR TACHYCARDIA): ICD-10-CM

## 2023-06-15 DIAGNOSIS — E78.5 HYPERLIPIDEMIA LDL GOAL <70: ICD-10-CM

## 2023-06-15 DIAGNOSIS — I25.10 CORONARY ARTERY DISEASE INVOLVING NATIVE CORONARY ARTERY OF NATIVE HEART WITHOUT ANGINA PECTORIS: ICD-10-CM

## 2023-06-15 NOTE — PROGRESS NOTES
[facility] Cardiology    Patient ID: Jennifer Dey is a 47 y.o. female.  : 1975   Contact: [phone]    Encounter date: 06/15/2023    PCP: Trang Kennedy APRN      Chief complaint:   Chief Complaint   Patient presents with    NSVT (nonsustained ventricular tachycardia)       Problem List:  Coronary artery disease:  Acute STEMI, 2018.  LHC, 2018: Successful PTCA/stent placement ×2 in the proximal and mid LAD using overlapping 2.5 x 18 mm Yvrose AUBREY postdilated. Noncritical disease in the LCx and RCA. EF 25% and anterior akinesis with apical dyskinesis.  Ischemic cardiomyopathy/Chronic systolic heart failure:  Echo, 2018: EF 20-25%. Trace-to-mild MR. Trace TR. No evidence of LV apical thrombus  LifeVest at discharge.  Echo, 2019: EF 30-35%. Anteroseptal akinesis.  ICD placement, 2019, Dr. Hernandez: Insertion of [employer] VDI implantable cardioverter defibrillator for primary prevention of SCD.  Nonsustained ventricular tachycardia  Noted on device interrogation 2020.  SVT  Hyperlipidemia.  Tobacco abuse, ongoing  Low back pain.    Allergies   Allergen Reactions    Atorvastatin Myalgia    Rosuvastatin Myalgia       Current Medications:    Current Outpatient Medications:     acetaminophen (TYLENOL) 500 MG tablet, Take 2 tablets by mouth Every 6 (Six) Hours As Needed for Mild Pain., Disp: , Rfl:     aspirin 81 MG chewable tablet, CHEW AND SWALLOW ONE (1) TABLET DAILY, Disp: 90 tablet, Rfl: 2    furosemide (LASIX) 20 MG tablet, TAKE ONE (1) TABLET BY MOUTH DAILY. NEED UPDATED LABS FOR FURTHER REFILLS, Disp: 90 tablet, Rfl: 0    lisinopril (PRINIVIL,ZESTRIL) 5 MG tablet, Take 1 tablet by mouth Daily., Disp: 90 tablet, Rfl: 3    Magnesium Oxide 400 (240 Mg) MG tablet, TAKE ONE (1) TABLET BY MOUTH DAILY., Disp: 90 tablet, Rfl: 0    metoprolol tartrate (LOPRESSOR) 25 MG tablet, Take 0.5 tablets by mouth 2 (Two) Times a Day., Disp: 90 tablet,  "Rfl: 0    nitroglycerin (NITROSTAT) 0.4 MG SL tablet, DISSOLVE ONE (1) TABLET UNDER THE TONGUE EVERY FIVE (5) MINUTES AS NEEDED FOR CHEST PAIN. TAKE NO MORE THAN THREE (3) DOSES IN 15 MINUTES, Disp: 25 tablet, Rfl: 11    pravastatin (PRAVACHOL) 20 MG tablet, Take 1 tablet by mouth Daily., Disp: 90 tablet, Rfl: 0    sotalol (BETAPACE) 80 MG tablet, Take 1 tablet by mouth 2 (Two) Times a Day., Disp: 180 tablet, Rfl: 0    spironolactone (ALDACTONE) 25 MG tablet, Take 1 tablet by mouth Daily., Disp: 90 tablet, Rfl: 3    vitamin B-12 (CYANOCOBALAMIN) 1000 MCG tablet, Take 1 tablet by mouth Daily., Disp: , Rfl:     Vitamin D, Cholecalciferol, (CHOLECALCIFEROL) 10 MCG (400 UNIT) tablet, Take 1 tablet by mouth Daily., Disp: , Rfl:     HPI    Jennifer Dey is a 47 y.o. female who presents today for a 6 month follow up of NSVT, CAD, chronic systolic heart failure, PSVT, and cardiac risk factors. Since last visit, patient has been doing well overall from a cardiovascular standpoint. She remains busy and active by playing basketball. Patient continues to smoke 1 and 1/2 ppd. She states that she has used an inhaler as well as patches and neither have helped. She is limited financially to the methods she can use to promote smoking cessation. Patient denies chest pain, shortness of breath, orthopnea, palpitations, edema, dizziness, and syncope.     The following portions of the patient's history were reviewed and updated as appropriate: allergies, current medications and problem list.    Pertinent positives as listed in the HPI.  All other systems reviewed are negative.         Vitals:    06/15/23 1424   BP: 114/58   BP Location: Right arm   Patient Position: Sitting   Pulse: 72   SpO2: 97%   Weight: 85.3 kg (188 lb)   Height: 152.4 cm (60\")       Physical Exam:  General: Alert and oriented.  Neck: Jugular venous pressure is within normal limits. Carotids have normal upstrokes without bruits.   Cardiovascular: Heart has a " nondisplaced focal PMI. Regular rate and rhythm. No murmur, gallop or rub.  Lungs: Clear, no rales or wheezes. Equal expansion is noted.   Extremities: Show no edema.  Skin: Warm and dry.  Neurologic: Nonfocal.     Diagnostic Data (reviewed with patient):    Lab date: 04/15/2022  FLP: , , HDL 28,       Procedures    DEVICE INTERROGATION:  [employer]ronik/2016, ICD: RV pacing 0%. P wave is 5.4 mV. R wave is 24 mV with a threshold of 0.5 V at 0.4 msec and an impedance of 594 ohms. Battery voltage is 3.11 V (76%). Events: 13 SVTs May 30 seconds MOS.       Assessment:    ICD-10-CM ICD-9-CM   1. NSVT (nonsustained ventricular tachycardia)  I47.29 427.1   2. Coronary artery disease involving native coronary artery of native heart without angina pectoris  I25.10 414.01   3. Chronic systolic heart failure  I50.22 428.22   4. Paroxysmal SVT (supraventricular tachycardia)  I47.1 427.0   5. Hyperlipidemia LDL goal <70  E78.5 272.4         Plan:  Routine FLP and CMP today to be completed to reassess lipid levels.   Strongly encouraged smoking cessation and counseled patient >3 minutes.  Continue on aspirin 81 mg daily for antiplatelet therapy.   Continue on Lasix 20 mg daily for fluid retention.  Continue on lisinopril 5 mg daily for hypertension.   Continue on metoprolol 12.5 mg BID for rate control and hypertension.  Continue on nitroglycerin 0.4 mg as needed for angina.  Continue on pravastatin 20 mg daily for hyperlipidemia.  Continue on sotalol 80 mg BID for rhythm control.  Continue on spironolactone 25 mg daily for fluid retention and hypertension.  Device is monitored remotely.  Continue all other current medications.  F/up in 12 months, sooner if needed.    Scribed for Elen Boothe MD by Margarita Schultz. 6/15/2023 14:42 EDT    I Elen Boothe MD personally performed the services described in this documentation as scribed by the above individual in my presence, and it is both accurate and  complete.    Elen Boothe MD, FACC

## 2023-08-04 PROCEDURE — 93295 DEV INTERROG REMOTE 1/2/MLT: CPT | Performed by: STUDENT IN AN ORGANIZED HEALTH CARE EDUCATION/TRAINING PROGRAM

## 2023-08-04 PROCEDURE — 93296 REM INTERROG EVL PM/IDS: CPT | Performed by: STUDENT IN AN ORGANIZED HEALTH CARE EDUCATION/TRAINING PROGRAM

## 2023-08-16 ENCOUNTER — TELEPHONE (OUTPATIENT)
Dept: CARDIOLOGY | Facility: CLINIC | Age: 48
End: 2023-08-16
Payer: COMMERCIAL

## 2023-08-16 RX ORDER — FUROSEMIDE 20 MG/1
TABLET ORAL
Qty: 90 TABLET | Refills: 0 | Status: SHIPPED | OUTPATIENT
Start: 2023-08-16

## 2023-08-16 NOTE — TELEPHONE ENCOUNTER
Spoke with patient's daughter, they are agreeable to trying Repatha. I explained that insurance coverage may not be optimal and we could then try Praluent. She will contact us if there are issues.

## 2023-08-16 NOTE — TELEPHONE ENCOUNTER
----- Message from Kate Hunter sent at 6/27/2023  9:45 AM EDT -----    ----- Message -----  From: Elen Boothe MD  Sent: 6/27/2023   7:45 AM EDT  To: Sara Gao RN    HDL 30/!!  Will she try Praluent or Repatha?

## 2023-08-22 ENCOUNTER — TELEPHONE (OUTPATIENT)
Dept: CARDIOLOGY | Facility: CLINIC | Age: 48
End: 2023-08-22
Payer: COMMERCIAL

## 2023-08-22 NOTE — TELEPHONE ENCOUNTER
Labs dated 6/15/23  Chol 244  Trigs 298  HDL 30    Per [facility]-she would like the patient to try praluent or repatha.    Spoke with patient daughter.  She states we have called in one of these already and it needed a PA.  I will follow up on this and get back with them.  She verbalizes understanding.

## 2023-09-11 ENCOUNTER — TELEPHONE (OUTPATIENT)
Dept: CARDIOLOGY | Facility: CLINIC | Age: 48
End: 2023-09-11
Payer: COMMERCIAL

## 2023-09-12 RX ORDER — LANOLIN ALCOHOL/MO/W.PET/CERES
CREAM (GRAM) TOPICAL
Qty: 90 TABLET | Refills: 2 | Status: SHIPPED | OUTPATIENT
Start: 2023-09-12

## 2023-10-16 RX ORDER — ASPIRIN 81 MG
TABLET,CHEWABLE ORAL
Qty: 90 TABLET | Refills: 2 | Status: SHIPPED | OUTPATIENT
Start: 2023-10-16

## 2023-12-02 NOTE — OUTREACH NOTE
AMI Week 1 Survey      Responses   Facility patient discharged from?  [place]   Does the patient have one of the following disease processes/diagnoses(primary or secondary)?  Acute MI (STEMI,NSTEMI)   Is there a successful TCM telephone encounter documented?  No   Week 1 attempt successful?  No   Unsuccessful attempts  Attempt 2          Thi Pride RN  
cranial nerves 2-12 intact/central and peripheral vision intact/extra-ocular movements intact/tongue is midline

## 2024-01-15 ENCOUNTER — TELEPHONE (OUTPATIENT)
Dept: CARDIOLOGY | Facility: CLINIC | Age: 49
End: 2024-01-15
Payer: COMMERCIAL

## 2024-01-15 NOTE — TELEPHONE ENCOUNTER
Daughter called wanting to know if her mom got shocked.  She woke up suddenly in the middle of the night thinking that is what happened.  No shocks on home monitoring.  Daughter is aware.

## 2024-02-13 RX ORDER — FUROSEMIDE 20 MG/1
20 TABLET ORAL DAILY
Qty: 90 TABLET | Refills: 0 | Status: SHIPPED | OUTPATIENT
Start: 2024-02-13

## 2024-03-20 ENCOUNTER — TELEPHONE (OUTPATIENT)
Dept: CARDIOLOGY | Facility: CLINIC | Age: 49
End: 2024-03-20
Payer: COMMERCIAL

## 2024-03-20 NOTE — TELEPHONE ENCOUNTER
Remote transmission reviewed today of her Biotronik ICD.      HV lead impedance out of range @ >150 ohms.  Known to patient.  Intermittent HV lead impedance out of range-greater than 150 ohms since 6/16/2023. Consistent >150 ohms since 12/22/2023.     Next FU appointment scheduled with Dr. Boothe on 06/20/2024.     Continue to monitor or new recommendations? Please advise.

## 2024-03-22 NOTE — TELEPHONE ENCOUNTER
This is correct.  As far as we know, there are no other lead issues.      Here is the Shock impedance trends.      Patient is scheduled for a follow-up with Dr. Boothe on 06/20/2024. When would you like the patient to be seen?

## 2024-03-26 NOTE — TELEPHONE ENCOUNTER
I spoke with patient's daughter.  Patient will come in for a follow-up appointment in the next few weeks.      Demarcus Ly,     Will you please call the patient's daughter and schedule pt a follow-up appointment with Dr. Spencer and Hemant?

## 2024-04-03 ENCOUNTER — TELEPHONE (OUTPATIENT)
Dept: CARDIOLOGY | Facility: CLINIC | Age: 49
End: 2024-04-03
Payer: COMMERCIAL

## 2024-04-03 ENCOUNTER — OFFICE VISIT (OUTPATIENT)
Dept: PRIMARY CARE CLINIC | Age: 49
End: 2024-04-03
Payer: COMMERCIAL

## 2024-04-03 VITALS
HEIGHT: 61 IN | SYSTOLIC BLOOD PRESSURE: 128 MMHG | TEMPERATURE: 97.5 F | OXYGEN SATURATION: 97 % | WEIGHT: 187 LBS | DIASTOLIC BLOOD PRESSURE: 85 MMHG | BODY MASS INDEX: 35.3 KG/M2 | HEART RATE: 84 BPM

## 2024-04-03 DIAGNOSIS — H60.501 ACUTE OTITIS EXTERNA OF RIGHT EAR, UNSPECIFIED TYPE: Primary | ICD-10-CM

## 2024-04-03 PROCEDURE — 99213 OFFICE O/P EST LOW 20 MIN: CPT | Performed by: PHYSICIAN ASSISTANT

## 2024-04-03 RX ORDER — CIPROFLOXACIN 500 MG/1
TABLET, FILM COATED ORAL
COMMUNITY
Start: 2024-03-25

## 2024-04-03 RX ORDER — SOTALOL HYDROCHLORIDE 80 MG/1
1 TABLET ORAL 2 TIMES DAILY
COMMUNITY
Start: 2023-07-21

## 2024-04-03 RX ORDER — CIPROFLOXACIN AND DEXAMETHASONE 3; 1 MG/ML; MG/ML
4 SUSPENSION/ DROPS AURICULAR (OTIC) 2 TIMES DAILY
Qty: 7.5 ML | Refills: 0 | Status: SHIPPED | OUTPATIENT
Start: 2024-04-03 | End: 2024-04-10

## 2024-04-03 SDOH — ECONOMIC STABILITY: INCOME INSECURITY: HOW HARD IS IT FOR YOU TO PAY FOR THE VERY BASICS LIKE FOOD, HOUSING, MEDICAL CARE, AND HEATING?: NOT VERY HARD

## 2024-04-03 SDOH — ECONOMIC STABILITY: FOOD INSECURITY: WITHIN THE PAST 12 MONTHS, THE FOOD YOU BOUGHT JUST DIDN'T LAST AND YOU DIDN'T HAVE MONEY TO GET MORE.: NEVER TRUE

## 2024-04-03 SDOH — ECONOMIC STABILITY: HOUSING INSECURITY
IN THE LAST 12 MONTHS, WAS THERE A TIME WHEN YOU DID NOT HAVE A STEADY PLACE TO SLEEP OR SLEPT IN A SHELTER (INCLUDING NOW)?: NO

## 2024-04-03 SDOH — ECONOMIC STABILITY: FOOD INSECURITY: WITHIN THE PAST 12 MONTHS, YOU WORRIED THAT YOUR FOOD WOULD RUN OUT BEFORE YOU GOT MONEY TO BUY MORE.: NEVER TRUE

## 2024-04-03 ASSESSMENT — PATIENT HEALTH QUESTIONNAIRE - PHQ9
2. FEELING DOWN, DEPRESSED OR HOPELESS: NOT AT ALL
SUM OF ALL RESPONSES TO PHQ QUESTIONS 1-9: 0

## 2024-04-03 ASSESSMENT — ENCOUNTER SYMPTOMS
RESPIRATORY NEGATIVE: 1
GASTROINTESTINAL NEGATIVE: 1

## 2024-04-03 NOTE — PROGRESS NOTES
Chief Complaint   Patient presents with    Otalgia     Right ear pain x 1.5 weeks. Patient states pain radiates into her neck. Neg for fever. She has taken antibiotic and taking ibuprofen without relief.

## 2024-04-03 NOTE — TELEPHONE ENCOUNTER
Name: Margoth Biswas    Relationship: Emergency Contact    Best Callback Number: [phone]    Incoming call to the Hub, requesting to  Reschedule their Other: BTK  appointment on 04-16-24.     Per Hub workflow, further review is needed before the task can be completed.    Result of Call: Please reach out to the patient to reschedule

## 2024-04-03 NOTE — PROGRESS NOTES
Subjective:     Mireya Ingram is a 48 y.o. female.    Chief Complaint   Patient presents with    Otalgia     Right ear pain x 1.5 weeks. Patient states pain radiates into her neck. Neg for fever. She has taken antibiotic and taking ibuprofen without relief.          HPI    Pt here with c/o 1.5 weeks of right ear pain. She saw her PCP and was given cipro PO, prednisone, norco and neomycin otic. She is having some decreased hearing. Pain radiates to jaw/TMJ. Denies URI sxs, fever. No ringing in ears, dizziness. No flying, dental work, etc recently.     LMP - 2 years ago. Postmenopausal      Current Outpatient Medications:     ciprofloxacin (CIPRO) 500 MG tablet, , Disp: , Rfl:     sotalol (BETAPACE) 80 MG tablet, Take 1 tablet by mouth 2 times daily, Disp: , Rfl:     ciprofloxacin-dexAMETHasone (CIPRODEX) 0.3-0.1 % otic suspension, Place 4 drops into the right ear 2 times daily for 7 days, Disp: 7.5 mL, Rfl: 0    ketorolac (TORADOL) 10 MG tablet, Take 1 tablet by mouth every 6 hours as needed for Pain, Disp: 20 tablet, Rfl: 0    triamcinolone (KENALOG) 0.1 % cream, Apply topically 2 times daily., Disp: 1 Tube, Rfl: 0    acetaminophen (TYLENOL) 500 MG tablet, Take 500 mg by mouth every 6 hours as needed for Pain, Disp: , Rfl:     aspirin 81 MG chewable tablet, Take 81 mg by mouth daily, Disp: , Rfl:     atorvastatin (LIPITOR) 40 MG tablet, Take 40 mg by mouth daily, Disp: , Rfl:     clopidogrel (PLAVIX) 75 MG tablet, Take 75 mg by mouth daily, Disp: , Rfl:     furosemide (LASIX) 20 MG tablet, Take 20 mg by mouth daily, Disp: , Rfl:     metoprolol tartrate (LOPRESSOR) 25 MG tablet, Take 25 mg by mouth 2 times daily, Disp: , Rfl:     nitroGLYCERIN (NITROSTAT) 0.4 MG SL tablet, Place 0.4 mg under the tongue every 5 minutes as needed for Chest pain up to max of 3 total doses. If no relief after 1 dose, call 911., Disp: , Rfl:     spironolactone (ALDACTONE) 25 MG tablet, Take 25 mg by mouth daily, Disp: , Rfl:

## 2024-04-04 NOTE — TELEPHONE ENCOUNTER
FLAVIO HELTON VIA PHONE SHE WANTED TO SWITCH TO DR. PLEITEZ BECAUSE SHE IS ONLY AVAILABLE ON MONDAYS TO BRING PT TO HER APPT

## 2024-05-07 PROBLEM — I21.02 STEMI INVOLVING LEFT ANTERIOR DESCENDING CORONARY ARTERY: Status: RESOLVED | Noted: 2018-12-03 | Resolved: 2024-05-07

## 2024-05-07 PROBLEM — I21.02 ACUTE ST ELEVATION MYOCARDIAL INFARCTION (STEMI) INVOLVING LEFT ANTERIOR DESCENDING (LAD) CORONARY ARTERY: Status: RESOLVED | Noted: 2018-12-03 | Resolved: 2024-05-07

## 2024-05-07 PROBLEM — J96.01 ACUTE RESPIRATORY FAILURE WITH HYPOXIA: Status: RESOLVED | Noted: 2018-12-03 | Resolved: 2024-05-07

## 2024-05-07 PROBLEM — I50.21 ACUTE SYSTOLIC HEART FAILURE: Status: RESOLVED | Noted: 2018-12-13 | Resolved: 2024-05-07

## 2024-05-10 RX ORDER — SOTALOL HYDROCHLORIDE 80 MG/1
80 TABLET ORAL 2 TIMES DAILY
Qty: 180 TABLET | Refills: 3 | Status: SHIPPED | OUTPATIENT
Start: 2024-05-10

## 2024-05-10 RX ORDER — LANOLIN ALCOHOL/MO/W.PET/CERES
CREAM (GRAM) TOPICAL
Qty: 90 TABLET | Refills: 3 | Status: SHIPPED | OUTPATIENT
Start: 2024-05-10

## 2024-05-10 RX ORDER — NITROGLYCERIN 0.4 MG/1
TABLET SUBLINGUAL
Qty: 25 TABLET | Refills: 6 | Status: SHIPPED | OUTPATIENT
Start: 2024-05-10

## 2024-05-10 RX ORDER — FUROSEMIDE 20 MG/1
TABLET ORAL
Qty: 90 TABLET | Refills: 3 | Status: SHIPPED | OUTPATIENT
Start: 2024-05-10

## 2024-06-18 NOTE — PROGRESS NOTES
[facility]    Patient ID: Jennifer Dey is a 48 y.o. female.  : 1975   Contact: [phone]    Encounter date: 2024    PCP: Trang Kennedy APRN      Chief complaint:   Chief Complaint   Patient presents with    NSVT (nonsustained ventricular tachycardia)       Problem List:  Coronary artery disease:  Acute STEMI, 2018.  LHC, 2018: Successful PTCA/stent placement ×2 in the proximal and mid LAD using overlapping 2.5 x 18 mm Yvrose AUBREY postdilated. Noncritical disease in the LCx and RCA. EF 25% and anterior akinesis with apical dyskinesis.  Ischemic cardiomyopathy/Chronic systolic heart failure:  Echo, 2018: EF 20-25%. Trace-to-mild MR. Trace TR. No evidence of LV apical thrombus  LifeVest at discharge.  Echo, 2019: EF 30-35%. Anteroseptal akinesis.  ICD placement, 2019, Dr. Hernandez: Insertion of [employer] VDI implantable cardioverter defibrillator for primary prevention of SCD.  Nonsustained ventricular tachycardia  Noted on device interrogation 2020.  SVT  On sotalol therapy  Hyperlipidemia.  Tobacco abuse, ongoing  Low back pain.    Allergies   Allergen Reactions    Atorvastatin Myalgia    Rosuvastatin Myalgia       Current Medications:    Current Outpatient Medications:     acetaminophen (TYLENOL) 500 MG tablet, Take 2 tablets by mouth Every 6 (Six) Hours As Needed for Mild Pain., Disp: , Rfl:     Alirocumab 75 MG/ML solution auto-injector, Inject 1 mL under the skin into the appropriate area as directed Every 14 (Fourteen) Days., Disp: 6 mL, Rfl: 3    Aspirin Low Dose 81 MG chewable tablet, CHEW AND SWALLOW ONE (1) TABLET DAILY, Disp: 90 tablet, Rfl: 2    Cholecalciferol (vitamin D3) 125 MCG (5000 UT) tablet tablet, Take 1 tablet by mouth Daily., Disp: , Rfl:     furosemide (LASIX) 20 MG tablet, TAKE ONE (1) TABLET BY MOUTH EVERY DAY, Disp: 90 tablet, Rfl: 3    lisinopril (PRINIVIL,ZESTRIL) 5 MG tablet, Take 1  tablet by mouth Daily., Disp: 90 tablet, Rfl: 3    Magnesium Oxide -Mg Supplement 400 (240 Mg) MG tablet, TAKE ONE (1) TABLET BY MOUTH EVERY DAY, Disp: 90 tablet, Rfl: 3    metoprolol tartrate (LOPRESSOR) 25 MG tablet, TAKE ONE-HALF TABLET BY MOUTH TWICE DAILY, Disp: 90 tablet, Rfl: 0    nitroglycerin (NITROSTAT) 0.4 MG SL tablet, DISSOLVE ONE (1) TABLET UNDER THE TONGUE EVERY FIVE (5) MINUTES AS NEEDED FOR CHEST PAIN. TAKE NO MORE THAN THREE (3) DOSES IN 15 MINUTES, Disp: 25 tablet, Rfl: 6    ondansetron ODT (ZOFRAN-ODT) 4 MG disintegrating tablet, Place 1 tablet on the tongue Every 8 (Eight) Hours As Needed., Disp: , Rfl:     pravastatin (PRAVACHOL) 20 MG tablet, TAKE ONE (1) TABLET BY MOUTH DAILY, Disp: 90 tablet, Rfl: 3    sotalol (BETAPACE) 80 MG tablet, TAKE ONE (1) TABLET BY MOUTH TWICE DAILY, Disp: 180 tablet, Rfl: 3    spironolactone (ALDACTONE) 25 MG tablet, Take 1 tablet by mouth Daily., Disp: 90 tablet, Rfl: 3    vitamin B-12 (CYANOCOBALAMIN) 1000 MCG tablet, Take 1 tablet by mouth Daily., Disp: , Rfl:     HPI    Jennifer Dey is a 48 y.o. female who presents today for an annual follow up of nonsustained ventricular tachycardia, coronary artery disease, systolic heart failure, paroxysmal supraventricular tachycardia and cardiac risk factors. Since last visit, patient has overall been doing well.  She denies any chest pain, shortness of breath or lower extremity edema.  Patient remains on sotalol and has tolerated it well for her SVT.  She has had remote transmissions that have shown her HV lead impedance to be abnormal.  She had scheduled 2 appointments with our electrophysiology team 1 of which was canceled and 1 the patient no showed.  She was unaware of the no-show and states that her daughter takes all of her medical phone calls so she was unaware she missed an appointment.  Patient has not had recheck of her cholesterol since last year.  LDL was uncontrolled at that time.  Patient continues  "to smoke and has no plan to quit at this time.      The following portions of the patient's history were reviewed and updated as appropriate: allergies, current medications and problem list.    Pertinent positives as listed in the HPI.  All other systems reviewed are negative.         Vitals:    06/20/24 1410   BP: 112/70   BP Location: Left arm   Patient Position: Sitting   Pulse: 77   SpO2: 98%   Weight: 84.4 kg (186 lb)   Height: 154.9 cm (61\")       Physical Exam:  General: Alert and oriented.  Neck: Jugular venous pressure is within normal limits. Carotids have normal upstrokes without bruits.   Cardiovascular: Heart has a nondisplaced focal PMI. Regular rate and rhythm. No murmur, gallop or rub.  Lungs: Clear, no rales or wheezes. Equal expansion is noted.   Extremities: Show no edema.  Skin: Warm and dry.  Neurologic: Nonfocal.     Diagnostic Data (reviewed with patient):    Lab date: 06/15/2023  CMP: Glu 138, BUN 7, Creat 0.7, eGFR >60, Na 137, K 4.1, Cl 98, CO2 28, Ca 9.6, Alk Phos 106, AST 19, ALT 22  FLP: , , HDL 30,               ECG 12 Lead    Date/Time: 6/20/2024 2:58 PM  Performed by: Awilda Farias PA-C    Authorized by: Awilda Farias PA-C  Comparison: compared with previous ECG from 12/1/2022  Similar to previous ECG  Rhythm: sinus rhythm  BPM: 69  Conduction: left anterior fascicular block    Clinical impression: non-specific ECG  Comments: , QTc 439        Device interrogation: Formal 1 was not done within the office but reviewed patient's most recent remote transmission.  Patient continues to have HV lead impedance out of range which is chronic.        Assessment:    ICD-10-CM ICD-9-CM   1. NSVT (nonsustained ventricular tachycardia)  I47.29 427.1   2. Coronary artery disease involving native coronary artery of native heart without angina pectoris  I25.10 414.01   3. Chronic systolic heart failure  I50.22 428.22   4. Paroxysmal SVT (supraventricular tachycardia)  " I47.10 427.0   5. Hyperlipidemia LDL goal <70  E78.5 272.4   6. ICD (implantable cardioverter-defibrillator), dual, in situ  Z95.810 V45.02         Plan:  Stable cardiac status with no heart failure.  Encourage patient to reestablish care with our electrophysiology team.  Patient unfortunately missed last 2 appointments so we will resend the referral.  Strongly encouraged smoking cessation and counseled patient >3 minutes.   Continue on aspirin 81 mg daily for antiplatelet therapy.   Continue on Lasix 20 mg daily for fluid retention.  Continue on lisinopril 5 mg daily for hypertension.   Continue on metoprolol 12.5 mg BID for rate control and hypertension.  Continue on nitroglycerin 0.4 mg as needed for angina.  Continue on pravastatin 20 mg daily and Praluent 75 mg/mL injection every 2 weeks for hyperlipidemia.  Continue on sotalol 80 mg BID for rhythm control as her QTc is acceptable today.  Continue on spironolactone 25 mg daily for fluid retention and hypertension.  Device is monitored remotely.  Continue all other current medications.  F/up in 6 months, sooner if needed.    Awilda Farias PA-C

## 2024-06-20 ENCOUNTER — HOSPITAL ENCOUNTER (OUTPATIENT)
Facility: HOSPITAL | Age: 49
Discharge: HOME | End: 2024-06-20
Payer: COMMERCIAL

## 2024-06-20 ENCOUNTER — OFFICE VISIT (OUTPATIENT)
Dept: CARDIOLOGY | Facility: CLINIC | Age: 49
End: 2024-06-20
Payer: COMMERCIAL

## 2024-06-20 VITALS
DIASTOLIC BLOOD PRESSURE: 70 MMHG | SYSTOLIC BLOOD PRESSURE: 112 MMHG | BODY MASS INDEX: 35.12 KG/M2 | HEART RATE: 77 BPM | OXYGEN SATURATION: 98 % | WEIGHT: 186 LBS | HEIGHT: 61 IN

## 2024-06-20 DIAGNOSIS — E78.5 HYPERLIPIDEMIA LDL GOAL <70: ICD-10-CM

## 2024-06-20 DIAGNOSIS — I50.22 CHRONIC SYSTOLIC HEART FAILURE: ICD-10-CM

## 2024-06-20 DIAGNOSIS — I47.29 NSVT (NONSUSTAINED VENTRICULAR TACHYCARDIA): Primary | ICD-10-CM

## 2024-06-20 DIAGNOSIS — Z95.810 ICD (IMPLANTABLE CARDIOVERTER-DEFIBRILLATOR), DUAL, IN SITU: ICD-10-CM

## 2024-06-20 DIAGNOSIS — I25.10 CORONARY ARTERY DISEASE INVOLVING NATIVE CORONARY ARTERY OF NATIVE HEART WITHOUT ANGINA PECTORIS: ICD-10-CM

## 2024-06-20 DIAGNOSIS — I47.10 PAROXYSMAL SVT (SUPRAVENTRICULAR TACHYCARDIA): ICD-10-CM

## 2024-06-20 PROCEDURE — 99214 OFFICE O/P EST MOD 30 MIN: CPT | Performed by: PHYSICIAN ASSISTANT

## 2024-06-20 PROCEDURE — 93000 ELECTROCARDIOGRAM COMPLETE: CPT | Performed by: PHYSICIAN ASSISTANT

## 2024-06-20 PROCEDURE — 93005 ELECTROCARDIOGRAM TRACING: CPT

## 2024-06-20 RX ORDER — ONDANSETRON 4 MG/1
4 TABLET, ORALLY DISINTEGRATING ORAL EVERY 8 HOURS PRN
COMMUNITY
Start: 2024-06-17

## 2024-07-10 RX ORDER — ASPIRIN 81 MG/1
TABLET, CHEWABLE ORAL
Qty: 90 TABLET | Refills: 2 | Status: SHIPPED | OUTPATIENT
Start: 2024-07-10

## 2024-07-10 RX ORDER — SPIRONOLACTONE 25 MG/1
TABLET ORAL
Qty: 90 TABLET | Refills: 3 | Status: SHIPPED | OUTPATIENT
Start: 2024-07-10

## 2024-08-02 RX ORDER — PRAVASTATIN SODIUM 20 MG
TABLET ORAL
Qty: 90 TABLET | Refills: 3 | Status: SHIPPED | OUTPATIENT
Start: 2024-08-02

## 2024-08-02 NOTE — PROGRESS NOTES
Cardiac Electrophysiology Outpatient Consult Note            [facility]    Consult Note     Jennifer Dey  [id number]  08/05/2024  [phone]     Primary Care Physician: Trang Kennedy APRN    Referred By: Awilda Farias PA-C    Subjective     Chief Complaint:   Diagnoses and all orders for this visit:    1. NSVT (nonsustained ventricular tachycardia) (Primary)    2. Chronic systolic heart failure    3. ICD (implantable cardioverter-defibrillator), dual, in situ      Chief Complaint   Patient presents with    NSVT (nonsustained ventricular tachycardia)    ICD (implantable cardioverter-defibrillator) in place       History of Present Illness:   Jennifer Dey is a 48 y.o. female who presents to my electrophysiology clinic for evaluation of above complaints.  Diane is doing well.  First time I met her today.  She is here to reestablish with the arrhythmia clinic for maintenance of her ICD.    Past Medical History:   Patient Active Problem List    Diagnosis Date Noted    NSVT (nonsustained ventricular tachycardia) 07/16/2020    ICD (implantable cardioverter-defibrillator) in place 07/30/2019     Note Last Updated: 7/30/2019     1. [employer] VDI-ICD - MRI safe - April 2019      Chronic systolic heart failure 04/02/2019    Ischemic cardiomyopathy 03/07/2019     Note Last Updated: 5/7/2024     Echo, 12/05/2018: EF 20-25%. Trace-to-mild MR. Trace TR. No evidence of LV apical thrombus  Echo, 03/06/2019: EF 30-35%. Anteroseptal akinesis.  ICD placement, 04/08/2019, Dr. Hernandez: Insertion of [employer] VDI implantable cardioverter defibrillator for primary prevention of SCD.      Coronary artery disease involving native coronary artery of native heart without angina pectoris 01/17/2019     Note Last Updated: 5/7/2024     Acute STEMI, 12/03/2018.  LHC, 12/03/2018: Successful PTCA/stent placement ×2 in the proximal and mid LAD using overlapping 2.5 x 18 mm Yvrose  AUBREY postdilated. Noncritical disease in the LCx and RCA. EF 25% and anterior akinesis with apical dyskinesis.      Hyperlipidemia LDL goal <70 12/04/2018    Current smoker 12/03/2018    Bilateral pulmonary nodules 12/03/2018     Note Last Updated: 12/3/2018     Bilateral pulmonary nodules measuring up to 6 mm in size per CT at [facility] on 12/3       Morbidly obese 12/03/2018    Stress incontinence 12/03/2018       Past Surgical History:   Past Surgical History:   Procedure Laterality Date    CARDIAC CATHETERIZATION N/A 12/03/2018    Procedure: LEFT HEART CATH;  Surgeon: Elen Boothe MD;  Location:  [facility] CATH INVASIVE LOC[facility];  Service: Cardiology    CARDIAC ELECTROPHYSIOLOGY PROCEDURE N/A 04/08/2019    Procedure: Device Implant- single chamber ICD;  Surgeon: Bigg Hernandez MD;  Location:  [facility] EP INVASIVE LOCATION;  Service: Cardiology    CORONARY STENT PLACEMENT      two stents    CYSTOSCOPY  2012    MULTIPLE TOOTH EXTRACTIONS  2023    all teeth removed    TUBAL ABDOMINAL LIGATION  1996    WISDOM TOOTH EXTRACTION      WRIST FRACTURE SURGERY  2009       Social History:   Social History     Socioeconomic History    Marital status: Legally     Number of children: 2   Tobacco Use    Smoking status: Every Day     Current packs/day: 0.50     Average packs/day: 0.5 packs/day for 30.0 years (15.0 ttl pk-yrs)     Types: Cigarettes    Smokeless tobacco: Never   Vaping Use    Vaping status: Never Used   Substance and Sexual Activity    Alcohol use: No    Drug use: No    Sexual activity: Defer       Medications:     Current Outpatient Medications:     acetaminophen (TYLENOL) 500 MG tablet, Take 2 tablets by mouth Every 6 (Six) Hours As Needed for Mild Pain., Disp: , Rfl:     Alirocumab 75 MG/ML solution auto-injector, Inject 1 mL under the skin into the appropriate area as directed Every 14 (Fourteen) Days., Disp: 6 mL, Rfl: 3    aspirin 81 MG chewable tablet, CHEW AND SWALLOW ONE (1) TABLET DAILY, Disp:  90 tablet, Rfl: 2    Blood Glucose Monitoring Suppl (ONE TOUCH ULTRA 2) w/Device kit, See Admin Instructions., Disp: , Rfl:     Cholecalciferol (vitamin D3) 125 MCG (5000 UT) tablet tablet, Take 1 tablet by mouth Daily., Disp: , Rfl:     famotidine (PEPCID) 40 MG tablet, TAKE ONE (1) TABLET BY MOUTH EVERY DAY AS NEEDED FOR ACID REFLUX, Disp: , Rfl:     furosemide (LASIX) 20 MG tablet, TAKE ONE (1) TABLET BY MOUTH EVERY DAY, Disp: 90 tablet, Rfl: 3    Jardiance 10 MG tablet tablet, Take 1 tablet by mouth Daily., Disp: , Rfl:     Lancets (OneTouch Delica Plus [id number]) misc, USE TO TEST SUGAR TWICE DAILY, Disp: , Rfl:     Magnesium Oxide -Mg Supplement 400 (240 Mg) MG tablet, TAKE ONE (1) TABLET BY MOUTH EVERY DAY, Disp: 90 tablet, Rfl: 3    metoprolol tartrate (LOPRESSOR) 25 MG tablet, TAKE ONE-HALF TABLET BY MOUTH TWICE DAILY, Disp: 90 tablet, Rfl: 0    nitroglycerin (NITROSTAT) 0.4 MG SL tablet, DISSOLVE ONE (1) TABLET UNDER THE TONGUE EVERY FIVE (5) MINUTES AS NEEDED FOR CHEST PAIN. TAKE NO MORE THAN THREE (3) DOSES IN 15 MINUTES, Disp: 25 tablet, Rfl: 6    ondansetron ODT (ZOFRAN-ODT) 4 MG disintegrating tablet, Place 1 tablet on the tongue Every 8 (Eight) Hours As Needed., Disp: , Rfl:     OneTouch Ultra test strip, USE TO TEST BLOOD SUGAR TWICE DAILY, Disp: , Rfl:     pravastatin (PRAVACHOL) 20 MG tablet, TAKE ONE (1) TABLET BY MOUTH EVERY DAY, Disp: 90 tablet, Rfl: 3    sotalol (BETAPACE) 80 MG tablet, TAKE ONE (1) TABLET BY MOUTH TWICE DAILY, Disp: 180 tablet, Rfl: 3    spironolactone (ALDACTONE) 25 MG tablet, TAKE ONE (1) TABLET BY MOUTH EVERY DAY, Disp: 90 tablet, Rfl: 3    vitamin B-12 (CYANOCOBALAMIN) 1000 MCG tablet, Take 1 tablet by mouth Daily., Disp: , Rfl:     lisinopril (PRINIVIL,ZESTRIL) 5 MG tablet, Take 1 tablet by mouth Daily. (Patient not taking: Reported on 8/5/2024), Disp: 90 tablet, Rfl: 3    Allergies:   Allergies   Allergen Reactions    Atorvastatin Myalgia    Rosuvastatin Myalgia  "      Objective   Vital Signs:   Vitals:    08/05/24 1255   BP: 110/62   BP Location: Right arm   Patient Position: Sitting   Pulse: 61   SpO2: 97%   Weight: 82 kg (180 lb 12.8 oz)   Height: 154.9 cm (61\")       PHYSICAL EXAM    Neck: no adenopathy, no carotid bruit, no JVD, and thyroid: not enlarged  Lungs: clear to auscultation bilaterally and no rhonchi or crackles\", ' symmetric  Heart: regular rate and rhythm, S1, S2 normal, no murmur, click, rub or gallop  Abdomen: Soft, non-tender, bowel sounds normal,  no organomegaly  Extremities: extremities normal, atraumatic, no cyanosis or edema      Lab Results   Component Value Date    GLUCOSE 121 (H) 04/02/2019    CALCIUM 9.6 04/02/2019     04/02/2019    K 4.5 04/02/2019    CO2 23.0 04/02/2019     04/02/2019    BUN 10 04/02/2019    CREATININE 0.88 04/02/2019    EGFRIFNONA 69 04/02/2019    BCR 11.4 04/02/2019    ANIONGAP 10.0 04/02/2019     Lab Results   Component Value Date    WBC 10.85 (H) 04/02/2019    HGB 13.6 04/02/2019    HCT 42.1 04/02/2019    MCV 90.9 04/02/2019     04/02/2019     No results found for: \"INR\", \"PROTIME\"  Lab Results   Component Value Date    TSH 0.831 12/04/2018       Cardiac Testing:      I personally viewed and interpreted the patient's EKG/Telemetry/lab data    Procedures    Tobacco Cessation: N/A  Obstructive Sleep Apnea Screening: Completed    Assessment & Plan    Diagnoses and all orders for this visit:    1. NSVT (nonsustained ventricular tachycardia) (Primary)    2. Chronic systolic heart failure    3. ICD (implantable cardioverter-defibrillator), dual, in situ         Diagnosis Plan   1. NSVT (nonsustained ventricular tachycardia)  Stable.  No further episodes.      2. Chronic systolic heart failure  Stable.  Euvolemic.  Good medical therapy.  Follows with my cardiology colleagues [place].      3. ICD (implantable cardioverter-defibrillator), dual, in situ  The thrust of today's visit is to reestablish with us " in the EP department.  The patient has a transvenous [employer] ICD.  There was 1 out of spec high-voltage circuit impedance 1 year ago.  This was a single isolated high impedance.  Currently the high voltage impedance circuit is well within normal range.  It has been so for some time.  It was no evidence of impending failure of the high-voltage circuit or any other aspect of this VDD lead.    I personally reprogrammed the patient's ICD to reduce the risk of inappropriate ICD therapy in accordance with guidelines.      This patient's Cardiac Implanted Electronic Device was manually interrogated and reprogrammed during the patient encounter today.  Iterative programming changes were manually made to determine the sensing threshold, pacing threshold, lead impedance as well as underlying cardiac rhythm.  These programming changes were not limited to but included some or all of the following when appropriate: pacing mode, programmed AV delays, blanking periods, and refractory periods.  Data obtained as a result of these manual programing changes informed the patient's CIED permanent programming.          Body mass index is 34.16 kg/m².    I spent 37 minutes in consultation with this patient which included more than 65% of this time in direct face-to-face counseling, physical examination and discussion of my assessment and findings and this shared decision making with the patient.  The remainder of the time not spent face-to-face was performing one, some or all of the following actions: preparing to see the patient (e.g. reviewing tests, prior clinicians' notes, etc), ordering medications, tests or procedures, coordination of care, discussion of the plan with other healthcare providers, documenting clinical information in epic as well as independently interpreting results and communication of these results to the patient family and/or caregiver(s).  Please note that this explicitly excludes time spent on other separate  billable services such as performing procedures or test interpretation, when applicable.    Follow Up:       Thank you for allowing me to participate in the care of your patient. Please to not hesitate to contact me with additional questions or concerns.      Feroz Kc DO, FACC, [facility]  Cardiac Electrophysiologist  [facility]          Electronically signed by LARISSA Johnson, 08/02/24, 3:58 PM EDT.

## 2024-08-05 ENCOUNTER — OFFICE VISIT (OUTPATIENT)
Dept: CARDIOLOGY | Facility: CLINIC | Age: 49
End: 2024-08-05
Payer: COMMERCIAL

## 2024-08-05 VITALS
WEIGHT: 180.8 LBS | DIASTOLIC BLOOD PRESSURE: 62 MMHG | BODY MASS INDEX: 34.14 KG/M2 | OXYGEN SATURATION: 97 % | HEIGHT: 61 IN | HEART RATE: 61 BPM | SYSTOLIC BLOOD PRESSURE: 110 MMHG

## 2024-08-05 DIAGNOSIS — I50.22 CHRONIC SYSTOLIC HEART FAILURE: ICD-10-CM

## 2024-08-05 DIAGNOSIS — Z95.810 ICD (IMPLANTABLE CARDIOVERTER-DEFIBRILLATOR), DUAL, IN SITU: ICD-10-CM

## 2024-08-05 DIAGNOSIS — I47.29 NSVT (NONSUSTAINED VENTRICULAR TACHYCARDIA): Primary | ICD-10-CM

## 2024-08-05 PROCEDURE — 99214 OFFICE O/P EST MOD 30 MIN: CPT | Performed by: INTERNAL MEDICINE

## 2024-08-05 PROCEDURE — 93283 PRGRMG EVAL IMPLANTABLE DFB: CPT | Performed by: INTERNAL MEDICINE

## 2024-08-05 RX ORDER — BLOOD-GLUCOSE METER
EACH MISCELLANEOUS SEE ADMIN INSTRUCTIONS
COMMUNITY
Start: 2024-07-17

## 2024-08-05 RX ORDER — LANCETS 33 GAUGE
EACH MISCELLANEOUS
COMMUNITY
Start: 2024-07-17

## 2024-08-05 RX ORDER — EMPAGLIFLOZIN 10 MG/1
1 TABLET, FILM COATED ORAL DAILY
COMMUNITY
Start: 2024-07-18

## 2024-08-05 RX ORDER — FAMOTIDINE 40 MG/1
TABLET, FILM COATED ORAL
COMMUNITY
Start: 2024-08-02

## 2024-08-05 RX ORDER — BLOOD SUGAR DIAGNOSTIC
STRIP MISCELLANEOUS
COMMUNITY
Start: 2024-07-17

## 2024-08-06 RX ORDER — ALIROCUMAB 75 MG/ML
INJECTION, SOLUTION SUBCUTANEOUS
Qty: 6 ML | Refills: 3 | Status: SHIPPED | OUTPATIENT
Start: 2024-08-06

## 2025-01-02 ENCOUNTER — OFFICE VISIT (OUTPATIENT)
Dept: CARDIOLOGY | Facility: CLINIC | Age: 50
End: 2025-01-02
Payer: COMMERCIAL

## 2025-01-02 ENCOUNTER — HOSPITAL ENCOUNTER (OUTPATIENT)
Facility: HOSPITAL | Age: 50
Discharge: HOME | End: 2025-01-02
Payer: COMMERCIAL

## 2025-01-02 VITALS
SYSTOLIC BLOOD PRESSURE: 122 MMHG | BODY MASS INDEX: 34.1 KG/M2 | HEART RATE: 67 BPM | DIASTOLIC BLOOD PRESSURE: 82 MMHG | WEIGHT: 180.6 LBS | HEIGHT: 61 IN | OXYGEN SATURATION: 99 %

## 2025-01-02 DIAGNOSIS — I25.10 CORONARY ARTERY DISEASE INVOLVING NATIVE CORONARY ARTERY OF NATIVE HEART WITHOUT ANGINA PECTORIS: ICD-10-CM

## 2025-01-02 DIAGNOSIS — E78.5 HYPERLIPIDEMIA LDL GOAL <70: Primary | ICD-10-CM

## 2025-01-02 DIAGNOSIS — I25.5 ISCHEMIC CARDIOMYOPATHY: ICD-10-CM

## 2025-01-02 DIAGNOSIS — I50.22 CHRONIC SYSTOLIC HEART FAILURE: ICD-10-CM

## 2025-01-02 DIAGNOSIS — I47.29 NSVT (NONSUSTAINED VENTRICULAR TACHYCARDIA): ICD-10-CM

## 2025-01-02 PROCEDURE — 1160F RVW MEDS BY RX/DR IN RCRD: CPT | Performed by: INTERNAL MEDICINE

## 2025-01-02 PROCEDURE — 99214 OFFICE O/P EST MOD 30 MIN: CPT | Performed by: INTERNAL MEDICINE

## 2025-01-02 PROCEDURE — 93005 ELECTROCARDIOGRAM TRACING: CPT

## 2025-01-02 PROCEDURE — 1159F MED LIST DOCD IN RCRD: CPT | Performed by: INTERNAL MEDICINE

## 2025-01-02 PROCEDURE — 93000 ELECTROCARDIOGRAM COMPLETE: CPT | Performed by: INTERNAL MEDICINE

## 2025-01-02 RX ORDER — SUMATRIPTAN SUCCINATE 100 MG/1
100 TABLET ORAL
COMMUNITY

## 2025-01-02 RX ORDER — BUPROPION HYDROCHLORIDE 150 MG/1
150 TABLET, EXTENDED RELEASE ORAL 2 TIMES DAILY
Qty: 60 TABLET | Refills: 11 | Status: SHIPPED | OUTPATIENT
Start: 2025-01-02

## 2025-01-02 NOTE — PROGRESS NOTES
[facility] Cardiology  Office Note  Jennifer Dey  1975  [address]   [place] [address]     VISIT DATE:  01/02/25    PCP: Trang Kennedy APRN  [address]  [place] [address]    CC: Coronary disease  Chief Complaint   Patient presents with    NSVT (nonsustained ventricular tachycardia)       PROBLEM LIST:    Coronary artery disease:  Acute STEMI, 12/03/2018.  LHC, 12/03/2018: Successful PTCA/stent placement ×2 in the proximal and mid LAD using overlapping 2.5 x 18 mm Yvrose AUBREY postdilated. Noncritical disease in the LCx and RCA. EF 25% and anterior akinesis with apical dyskinesis.  Ischemic cardiomyopathy/Chronic systolic heart failure:  Echo, 12/05/2018: EF 20-25%. Trace-to-mild MR. Trace TR. No evidence of LV apical thrombus  LifeVest at discharge.  Echo, 03/06/2019: EF 30-35%. Anteroseptal akinesis.  ICD placement, 04/08/2019, Dr. Hernandez: Insertion of Biotronik VDI implantable cardioverter defibrillator for primary prevention of SCD.  Nonsustained ventricular tachycardia  Noted on device interrogation 07/16/2020.  SVT  On sotalol therapy  Hyperlipidemia.  Tobacco abuse, ongoing  Low back pain.    Allergies  Allergies   Allergen Reactions    Atorvastatin Myalgia    Rosuvastatin Myalgia       Current Medications    Current Outpatient Medications:     acetaminophen (TYLENOL) 500 MG tablet, Take 2 tablets by mouth Every 6 (Six) Hours As Needed for Mild Pain., Disp: , Rfl:     Alirocumab (Praluent) 75 MG/ML solution auto-injector, INJECT ONE (1) ML UNDER THE SKIN INTO THE APPROPRIATE AREA AS DIRECTED EVERY 14 (FOURTEEN) DAYS., Disp: 6 mL, Rfl: 3    aspirin 81 MG chewable tablet, CHEW AND SWALLOW ONE (1) TABLET DAILY, Disp: 90 tablet, Rfl: 2    Blood Glucose Monitoring Suppl (ONE TOUCH ULTRA 2) w/Device kit, See Admin Instructions., Disp: , Rfl:     Cholecalciferol (vitamin D3) 125 MCG (5000 UT) tablet tablet, Take 1 tablet by mouth Daily., Disp: , Rfl:     famotidine (PEPCID) 40 MG  tablet, TAKE ONE (1) TABLET BY MOUTH EVERY DAY AS NEEDED FOR ACID REFLUX, Disp: , Rfl:     furosemide (LASIX) 20 MG tablet, TAKE ONE (1) TABLET BY MOUTH EVERY DAY, Disp: 90 tablet, Rfl: 3    Jardiance 10 MG tablet tablet, Take 1 tablet by mouth Daily., Disp: , Rfl:     Lancets (OneTouch Delica Plus [id number]) misc, USE TO TEST SUGAR TWICE DAILY, Disp: , Rfl:     Magnesium Oxide -Mg Supplement 400 (240 Mg) MG tablet, TAKE ONE (1) TABLET BY MOUTH EVERY DAY, Disp: 90 tablet, Rfl: 3    metoprolol tartrate (LOPRESSOR) 25 MG tablet, TAKE ONE-HALF TABLET BY MOUTH TWICE DAILY, Disp: 90 tablet, Rfl: 0    nitroglycerin (NITROSTAT) 0.4 MG SL tablet, DISSOLVE ONE (1) TABLET UNDER THE TONGUE EVERY FIVE (5) MINUTES AS NEEDED FOR CHEST PAIN. TAKE NO MORE THAN THREE (3) DOSES IN 15 MINUTES, Disp: 25 tablet, Rfl: 6    ondansetron ODT (ZOFRAN-ODT) 4 MG disintegrating tablet, Place 1 tablet on the tongue Every 8 (Eight) Hours As Needed., Disp: , Rfl:     OneTouch Ultra test strip, USE TO TEST BLOOD SUGAR TWICE DAILY, Disp: , Rfl:     pravastatin (PRAVACHOL) 20 MG tablet, TAKE ONE (1) TABLET BY MOUTH EVERY DAY, Disp: 90 tablet, Rfl: 3    sotalol (BETAPACE) 80 MG tablet, TAKE ONE (1) TABLET BY MOUTH TWICE DAILY, Disp: 180 tablet, Rfl: 3    spironolactone (ALDACTONE) 25 MG tablet, TAKE ONE (1) TABLET BY MOUTH EVERY DAY, Disp: 90 tablet, Rfl: 3    SUMAtriptan (IMITREX) 100 MG tablet, Take 1 tablet by mouth Every 2 (Two) Hours As Needed for Migraine. Take one tablet at onset of headache. May repeat dose one time in 2 hours if headache not relieved., Disp: , Rfl:     vitamin B-12 (CYANOCOBALAMIN) 1000 MCG tablet, Take 1 tablet by mouth Daily., Disp: , Rfl:      History of Present Illness   Jennifer Dey is a 49 y.o. year old female who presents for consult from No ref. provider found for evaluation of coronary disease.  Patient is feeling relatively well at this time.  No chest pain pressure discomfort.  No shortness of breath no  "lower extremity swelling.  Patient is compliant with her medical therapy.  Patient had no ICD firings.  Patient has recently been started on Jardiance given her cardiomyopathy.  Otherwise weight has been stable.  No edema.  Just tired at times.  Patient is interested in smoking cessation.    Pt denies any chest pain, dyspnea at rest, dyspnea on exertion, orthopnea, PND, palpitations, lower extremity edema, or claudication. Pt denies history of CHF, DVT, PE, MI, CVA, TIA, or rheumatic fever.     SOCIAL HX  Social History     Socioeconomic History    Marital status: Legally     Number of children: 2   Tobacco Use    Smoking status: Every Day     Current packs/day: 1.50     Average packs/day: 1.5 packs/day for 30.0 years (45.0 ttl pk-yrs)     Types: Cigarettes    Smokeless tobacco: Never   Vaping Use    Vaping status: Never Used   Substance and Sexual Activity    Alcohol use: No    Drug use: No    Sexual activity: Defer       FAMILY HX  Family History   Problem Relation Age of Onset    Heart disease Mother     Breast cancer Mother     Alzheimer's disease Mother     Heart disease Father     Heart attack Sister     Heart disease Sister     No Known Problems Brother     Colon cancer Maternal Grandmother     Heart disease Maternal Grandfather     Heart disease Paternal Grandfather        Vitals:    01/02/25 1141   BP: 122/82   BP Location: Left arm   Patient Position: Sitting   Pulse: 67   SpO2: 99%   Weight: 81.9 kg (180 lb 9.6 oz)   Height: 154.9 cm (61\")     Body mass index is 34.12 kg/m².     PHYSICAL EXAMINATION:  Vitals and nursing note reviewed.   Constitutional:       Appearance: Healthy appearance. Not in distress.   Eyes:      Conjunctiva/sclera: Conjunctivae normal.      Pupils: Pupils are equal, round, and reactive to light.   HENT:      Nose: Nose normal.    Mouth/Throat:      Pharynx: Oropharynx is clear.   Neck:      Vascular: JVD normal.   Pulmonary:      Effort: Pulmonary effort is normal.      " Breath sounds: Normal breath sounds. No wheezing. No rhonchi. No rales.   Cardiovascular:      PMI at left midclavicular line. Normal rate. Regular rhythm. Normal S1. Normal S2.       Murmurs: There is no murmur.      No gallop.  No click. No rub.   Pulses:     Intact distal pulses.   Abdominal:      General: Bowel sounds are normal.      Palpations: Abdomen is soft.      Tenderness: There is no abdominal tenderness.   Musculoskeletal: Normal range of motion.         General: No tenderness.      Cervical back: Normal range of motion. Skin:     General: Skin is warm and dry.   Neurological:      General: No focal deficit present.      Mental Status: Alert and oriented to person, place and time.      Cranial Nerves: Cranial nerves 2-12 are intact.         Diagnostic Data:  Lab Results   Component Value Date    CHLPL 244 (H) 06/15/2023    TRIG 298 (H) 06/15/2023    HDL 30 (L) 06/15/2023     Lab Results   Component Value Date    GLUCOSE 121 (H) 04/02/2019    BUN 10 04/02/2019    CREATININE 0.88 04/02/2019     04/02/2019    K 4.5 04/02/2019     04/02/2019    CO2 23.0 04/02/2019     Lab Results   Component Value Date    HGBA1C 6.2 (H) 02/13/2019     Lab Results   Component Value Date    WBC 10.85 (H) 04/02/2019    HGB 13.6 04/02/2019    HCT 42.1 04/02/2019     04/02/2019         ECG 12 Lead    Date/Time: 1/2/2025 12:09 PM  Performed by: Jose Dickinson MD    Authorized by: Jose Dickinson MD  Comparison: not compared with previous ECG   Other findings: non-specific ST-T wave changes    Clinical impression: non-specific ECG          Advance Care Planning   ACP discussion was declined by the patient. Patient does not have an advance directive, declines further assistance.         ASSESSMENT:  Diagnoses and all orders for this visit:    1. Hyperlipidemia LDL goal <70 (Primary)    2. Coronary artery disease involving native coronary artery of native heart without angina pectoris    3.  Ischemic cardiomyopathy    4. Chronic systolic heart failure    5. NSVT (nonsustained ventricular tachycardia)        PLAN:    Discussed smoking cessation here today.  Will give Wellbutrin.  Patient had hallucinations on Chantix  Continue direct medical therapy otherwise for chronic systolic heart failure  The patient has other symptoms such as shortness of breath or chest pain we will pursue stress testing  See back in 6 months    Return in about 6 months (around 7/2/2025).     Jose Dickinson MD

## 2025-01-03 ENCOUNTER — TELEPHONE (OUTPATIENT)
Dept: CARDIOLOGY | Facility: CLINIC | Age: 50
End: 2025-01-03
Payer: COMMERCIAL

## 2025-01-03 DIAGNOSIS — I25.10 CORONARY ARTERY DISEASE INVOLVING NATIVE CORONARY ARTERY OF NATIVE HEART WITHOUT ANGINA PECTORIS: Primary | ICD-10-CM

## 2025-01-03 DIAGNOSIS — I47.29 NSVT (NONSUSTAINED VENTRICULAR TACHYCARDIA): ICD-10-CM

## 2025-01-03 NOTE — TELEPHONE ENCOUNTER
"Received this message from Dr. Dickinson, \"I changed my mind, let her know since she hasn't been feeling well I think needs a yesenia nuc there for cad, thanks\"    I spoke to pt and daughter Margoth. Informed them what Dr. Dickinson said. They are agreeable to test. I told them someone will be in contact with them to set up appt. They verbalized understanding.      "

## 2025-01-07 NOTE — TELEPHONE ENCOUNTER
I spoke to daughter to ask if pt is able to do tread mill. She said she can, she just doesn't know how much pt will be able to do.    Order has been placed.   Margoth said it would be best if test can be done at [facility] in [place], on a Monday or Thursday if possible, as she is pt transportation. Margoth will also need to be the one called for scheduling as pt does no have a cell phone.

## 2025-01-10 ENCOUNTER — TRANSCRIBE ORDERS (OUTPATIENT)
Facility: HOSPITAL | Age: 50
End: 2025-01-10

## 2025-01-10 DIAGNOSIS — I25.110 ATHEROSCLEROSIS OF NATIVE CORONARY ARTERY OF NATIVE HEART WITH UNSTABLE ANGINA PECTORIS (HCC): Primary | ICD-10-CM

## 2025-01-10 DIAGNOSIS — I25.10 ATHEROSCLEROSIS OF NATIVE CORONARY ARTERY OF NATIVE HEART WITHOUT ANGINA PECTORIS: ICD-10-CM

## 2025-01-10 DIAGNOSIS — I47.29 NONSUSTAINED VENTRICULAR TACHYCARDIA (HCC): Primary | ICD-10-CM

## 2025-03-13 ENCOUNTER — HOSPITAL ENCOUNTER (OUTPATIENT)
Dept: NUCLEAR MEDICINE | Facility: HOSPITAL | Age: 50
Discharge: HOME | End: 2025-03-13
Attending: INTERNAL MEDICINE
Payer: COMMERCIAL

## 2025-03-13 ENCOUNTER — HOSPITAL ENCOUNTER (OUTPATIENT)
Facility: HOSPITAL | Age: 50
Discharge: HOME | End: 2025-03-15
Attending: INTERNAL MEDICINE
Payer: COMMERCIAL

## 2025-03-13 ENCOUNTER — OUTSIDE FACILITY SERVICE (OUTPATIENT)
Dept: CARDIOLOGY | Facility: CLINIC | Age: 50
End: 2025-03-13
Payer: COMMERCIAL

## 2025-03-13 DIAGNOSIS — I25.110 ATHEROSCLEROSIS OF NATIVE CORONARY ARTERY OF NATIVE HEART WITH UNSTABLE ANGINA PECTORIS (HCC): ICD-10-CM

## 2025-03-13 LAB
NUC STRESS EJECTION FRACTION: 39 %
STRESS ST DEPRESSION: 0 MM
STRESS TARGET HR: 169 BPM

## 2025-03-13 PROCEDURE — A9500 TC99M SESTAMIBI: HCPCS | Performed by: INTERNAL MEDICINE

## 2025-03-13 PROCEDURE — 93017 CV STRESS TEST TRACING ONLY: CPT

## 2025-03-13 PROCEDURE — 3430000000 HC RX DIAGNOSTIC RADIOPHARMACEUTICAL: Performed by: INTERNAL MEDICINE

## 2025-03-13 PROCEDURE — 6360000002 HC RX W HCPCS: Performed by: INTERNAL MEDICINE

## 2025-03-13 PROCEDURE — 78452 HT MUSCLE IMAGE SPECT MULT: CPT

## 2025-03-13 RX ORDER — TETRAKIS(2-METHOXYISOBUTYLISOCYANIDE)COPPER(I) TETRAFLUOROBORATE 1 MG/ML
30 INJECTION, POWDER, LYOPHILIZED, FOR SOLUTION INTRAVENOUS
Status: COMPLETED | OUTPATIENT
Start: 2025-03-13 | End: 2025-03-13

## 2025-03-13 RX ORDER — REGADENOSON 0.08 MG/ML
0.4 INJECTION, SOLUTION INTRAVENOUS
Status: COMPLETED | OUTPATIENT
Start: 2025-03-13 | End: 2025-03-13

## 2025-03-13 RX ORDER — TETRAKIS(2-METHOXYISOBUTYLISOCYANIDE)COPPER(I) TETRAFLUOROBORATE 1 MG/ML
10 INJECTION, POWDER, LYOPHILIZED, FOR SOLUTION INTRAVENOUS
Status: COMPLETED | OUTPATIENT
Start: 2025-03-13 | End: 2025-03-13

## 2025-03-13 RX ADMIN — TETRAKIS(2-METHOXYISOBUTYLISOCYANIDE)COPPER(I) TETRAFLUOROBORATE 10 MILLICURIE: 1 INJECTION, POWDER, LYOPHILIZED, FOR SOLUTION INTRAVENOUS at 08:23

## 2025-03-13 RX ADMIN — TETRAKIS(2-METHOXYISOBUTYLISOCYANIDE)COPPER(I) TETRAFLUOROBORATE 30 MILLICURIE: 1 INJECTION, POWDER, LYOPHILIZED, FOR SOLUTION INTRAVENOUS at 10:20

## 2025-03-13 RX ADMIN — REGADENOSON 0.4 MG: 0.08 INJECTION, SOLUTION INTRAVENOUS at 10:12

## 2025-03-25 ENCOUNTER — TELEPHONE (OUTPATIENT)
Dept: CARDIOLOGY | Facility: CLINIC | Age: 50
End: 2025-03-25
Payer: COMMERCIAL

## 2025-03-25 RX ORDER — DAPAGLIFLOZIN 10 MG/1
1 TABLET, FILM COATED ORAL DAILY
Start: 2025-03-25

## 2025-03-25 NOTE — TELEPHONE ENCOUNTER
----- Message from Brandt Valdivia sent at 3/25/2025 12:50 PM EDT -----  Stress test revealed evidence of previous heart attack.  Overall heart function stable compared to previous.  No new blockages identified.  ----- Message -----  From: Farnaz Villar  Sent: 3/25/2025  11:25 AM EDT  To: Brandt Valdivia III, MD

## 2025-03-25 NOTE — TELEPHONE ENCOUNTER
Pt daughter Margoth, who is on HIPAA, returned call. I informed her what Dr. Valdivia said about stress test results. She verbalized understanding.     She wanted to let Dr. Valdivia know that pt was having side effects of Jardiance, and has been switched to Farxiga 10mg daily by pcp, who is managing her diabetes.    I have made the med change on med list.

## 2025-04-22 RX ORDER — ASPIRIN 81 MG/1
TABLET, CHEWABLE ORAL
Qty: 90 TABLET | Refills: 3 | Status: SHIPPED | OUTPATIENT
Start: 2025-04-22

## 2025-04-22 RX ORDER — FUROSEMIDE 20 MG/1
20 TABLET ORAL DAILY
Qty: 90 TABLET | Refills: 3 | Status: SHIPPED | OUTPATIENT
Start: 2025-04-22

## 2025-07-02 RX ORDER — SPIRONOLACTONE 25 MG/1
25 TABLET ORAL DAILY
Qty: 30 TABLET | Refills: 0 | Status: SHIPPED | OUTPATIENT
Start: 2025-07-02

## 2025-07-03 ENCOUNTER — OFFICE VISIT (OUTPATIENT)
Dept: CARDIOLOGY | Facility: CLINIC | Age: 50
End: 2025-07-03
Payer: COMMERCIAL

## 2025-07-03 VITALS
OXYGEN SATURATION: 97 % | HEART RATE: 74 BPM | HEIGHT: 61 IN | DIASTOLIC BLOOD PRESSURE: 68 MMHG | SYSTOLIC BLOOD PRESSURE: 108 MMHG | WEIGHT: 175 LBS | BODY MASS INDEX: 33.04 KG/M2

## 2025-07-03 DIAGNOSIS — I47.29 NSVT (NONSUSTAINED VENTRICULAR TACHYCARDIA): ICD-10-CM

## 2025-07-03 DIAGNOSIS — E78.5 HYPERLIPIDEMIA LDL GOAL <70: ICD-10-CM

## 2025-07-03 DIAGNOSIS — I50.22 CHRONIC SYSTOLIC HEART FAILURE: ICD-10-CM

## 2025-07-03 DIAGNOSIS — I25.10 CORONARY ARTERY DISEASE INVOLVING NATIVE CORONARY ARTERY OF NATIVE HEART WITHOUT ANGINA PECTORIS: Primary | ICD-10-CM

## 2025-07-03 PROCEDURE — 99214 OFFICE O/P EST MOD 30 MIN: CPT | Performed by: INTERNAL MEDICINE

## 2025-07-03 RX ORDER — SEMAGLUTIDE 0.68 MG/ML
0.25 INJECTION, SOLUTION SUBCUTANEOUS WEEKLY
COMMUNITY
Start: 2025-06-24

## 2025-07-03 RX ORDER — ROPINIROLE 0.5 MG/1
0.5 TABLET, FILM COATED ORAL NIGHTLY
COMMUNITY
Start: 2025-06-25

## 2025-07-03 RX ORDER — METOPROLOL SUCCINATE 25 MG/1
25 TABLET, EXTENDED RELEASE ORAL DAILY
Qty: 90 TABLET | Refills: 3 | Status: SHIPPED | OUTPATIENT
Start: 2025-07-03

## 2025-07-03 RX ORDER — OMEPRAZOLE 40 MG/1
1 CAPSULE, DELAYED RELEASE ORAL DAILY
COMMUNITY
Start: 2025-04-22

## 2025-07-03 RX ORDER — PROMETHAZINE HYDROCHLORIDE 25 MG/1
1 TABLET ORAL
COMMUNITY
Start: 2025-06-25

## 2025-07-03 NOTE — PROGRESS NOTES
[facility] Cardiology  Office visit  Jennifer Dey  1975  [phone]  There is no work phone number on file.    VISIT DATE:  7/3/2025    PCP: Trang Kennedy APRN  [address] [place] [id number]    CC:  Chief Complaint   Patient presents with    Hyperlipidemia LDL goal <70    NSVT (nonsustained ventricular tachycardia)     PROBLEM LIST:     Coronary artery disease:  Acute STEMI, 12/03/2018.  LHC, 12/03/2018: Successful PTCA/stent placement ×2 in the proximal and mid LAD using overlapping 2.5 x 18 mm Yvrose AUBREY postdilated. Noncritical disease in the LCx and RCA. EF 25% and anterior akinesis with apical dyskinesis.  Ischemic cardiomyopathy/Chronic systolic heart failure:  Echo, 12/05/2018: EF 20-25%. Trace-to-mild MR. Trace TR. No evidence of LV apical thrombus  LifeVest at discharge.  Echo, 03/06/2019: EF 30-35%. Anteroseptal akinesis.  ICD placement, 04/08/2019, Dr. Hernandez: Insertion of Biotronik VDI implantable cardioverter defibrillator for primary prevention of SCD.  Nonsustained ventricular tachycardia  Noted on device interrogation 07/16/2020.  SVT  On sotalol therapy  Hyperlipidemia.  Tobacco abuse, ongoing  Low back pain.    Previous cardiac studies and procedures:  March 2025 Zee scan SPECT MPI    Stress Combined Conclusion: Large, fixed, severe perfusion abnormality   consistent with anterior apical myocardial infarction.  No ischemia   visualized.  Findings suggest a high risk of cardiac events.     Stress Function: Left ventricular function post-stress is abnormal.   Global function is moderately reduced. Post-stress ejection fraction is   39%.       ASSESSMENT:   Diagnosis Plan   1. Coronary artery disease involving native coronary artery of native heart without angina pectoris        2. Chronic systolic heart failure        3. Hyperlipidemia LDL goal <70        4. NSVT (nonsustained ventricular tachycardia)            PLAN:  Coronary artery disease:  "Currently asymptomatic.  Continue current medical therapy.    Heart failure with reduced ejection fraction, chronic: Currently euvolemic and compensated.  EF 39%.  Switching metoprolol to tartrate to metoprolol succinate, otherwise continue current medical therapy.  Marginal pressures limiting more aggressive titration of GDMT.    Hyperlipidemia: Goal LDL less than 55.  Continue combination of pravastatin and Praluent.    SVT: Continue combination of sotalol and beta-blockade.    Subjective  No change in baseline functional capacity.  Denies chest pain, palpitations or dyspnea.  Compliant with medical therapy.  Denies lightheadedness.    PHYSICAL EXAMINATION:  Vitals:    07/03/25 1322   BP: 108/68   BP Location: Left arm   Patient Position: Sitting   Pulse: 74   SpO2: 97%   Weight: 79.4 kg (175 lb)   Height: 154.9 cm (61\")     General Appearance:    Alert, cooperative, no distress, appears stated age   Head:    Normocephalic, without obvious abnormality, atraumatic   Eyes:    conjunctiva/corneas clear   Nose:   Nares normal, septum midline, mucosa normal, no drainage   Throat:   Lips, teeth and gums normal   Neck:   Supple, symmetrical, trachea midline, no carotid    bruit or JVD   Lungs:     Clear to auscultation bilaterally, respirations unlabored   Chest Wall:    No tenderness or deformity    Heart:    Regular rate and rhythm, S1 and S2 normal, no murmur, rub   or gallop, normal carotid impulse bilaterally without bruit.   Abdomen:     Soft, non-tender   Extremities:   Extremities normal, atraumatic, no cyanosis or edema   Pulses:   2+ and symmetric all extremities   Skin:   Skin color, texture, turgor normal, no rashes or lesions       Diagnostic Data:  Procedures  Lab Results   Component Value Date    CHLPL 244 (H) 06/15/2023    TRIG 298 (H) 06/15/2023    HDL 30 (L) 06/15/2023     Lab Results   Component Value Date    GLUCOSE 121 (H) 04/02/2019    BUN 10 04/02/2019    CREATININE 0.88 04/02/2019     " 04/02/2019    K 4.5 04/02/2019     04/02/2019    CO2 23.0 04/02/2019     Lab Results   Component Value Date    HGBA1C 6.2 (H) 02/13/2019     Lab Results   Component Value Date    WBC 10.85 (H) 04/02/2019    HGB 13.6 04/02/2019    HCT 42.1 04/02/2019     04/02/2019       Allergies  Allergies   Allergen Reactions    Atorvastatin Myalgia    Rosuvastatin Myalgia       Current Medications    Current Outpatient Medications:     acetaminophen (TYLENOL) 500 MG tablet, Take 2 tablets by mouth Every 6 (Six) Hours As Needed for Mild Pain., Disp: , Rfl:     Alirocumab (Praluent) 75 MG/ML solution auto-injector, INJECT ONE (1) ML UNDER THE SKIN INTO THE APPROPRIATE AREA AS DIRECTED EVERY 14 (FOURTEEN) DAYS., Disp: 6 mL, Rfl: 3    aspirin 81 MG chewable tablet, CHEW AND SWALLOW ONE (1) TABLET DAILY, Disp: 90 tablet, Rfl: 3    Blood Glucose Monitoring Suppl (ONE TOUCH ULTRA 2) w/Device kit, See Admin Instructions., Disp: , Rfl:     buPROPion SR (Wellbutrin SR) 150 MG 12 hr tablet, Take 1 tablet by mouth 2 (Two) Times a Day., Disp: 60 tablet, Rfl: 11    Cholecalciferol (vitamin D3) 125 MCG (5000 UT) tablet tablet, Take 1 tablet by mouth Daily., Disp: , Rfl:     dapagliflozin Propanediol (Farxiga) 10 MG tablet, Take 10 mg by mouth Daily., Disp: , Rfl:     Diclofenac Sodium (VOLTAREN) 1 % gel gel, Apply 4 g topically to the appropriate area as directed As Needed., Disp: , Rfl:     famotidine (PEPCID) 40 MG tablet, TAKE ONE (1) TABLET BY MOUTH EVERY DAY AS NEEDED FOR ACID REFLUX, Disp: , Rfl:     furosemide (LASIX) 20 MG tablet, TAKE ONE (1) TABLET BY MOUTH EVERY DAY, Disp: 90 tablet, Rfl: 3    Lancets (OneTouch Delica Plus [id number]) misc, USE TO TEST SUGAR TWICE DAILY, Disp: , Rfl:     Magnesium Oxide -Mg Supplement 400 (240 Mg) MG tablet, TAKE ONE (1) TABLET BY MOUTH EVERY DAY, Disp: 90 tablet, Rfl: 3    metFORMIN (GLUCOPHAGE) 500 MG tablet, Take 1 tablet by mouth Daily With Breakfast., Disp: , Rfl:     metoprolol  tartrate (LOPRESSOR) 25 MG tablet, TAKE ONE-HALF TABLET BY MOUTH TWICE DAILY, Disp: 90 tablet, Rfl: 0    nitroglycerin (NITROSTAT) 0.4 MG SL tablet, DISSOLVE ONE (1) TABLET UNDER THE TONGUE EVERY FIVE (5) MINUTES AS NEEDED FOR CHEST PAIN. TAKE NO MORE THAN THREE (3) DOSES IN 15 MINUTES, Disp: 25 tablet, Rfl: 6    omeprazole (priLOSEC) 40 MG capsule, Take 1 capsule by mouth Daily., Disp: , Rfl:     ondansetron ODT (ZOFRAN-ODT) 4 MG disintegrating tablet, Place 1 tablet on the tongue Every 8 (Eight) Hours As Needed., Disp: , Rfl:     OneTouch Ultra test strip, USE TO TEST BLOOD SUGAR TWICE DAILY, Disp: , Rfl:     Ozempic, 0.25 or 0.5 MG/DOSE, 2 MG/3ML solution pen-injector, Inject 0.25 mg under the skin into the appropriate area as directed 1 (One) Time Per Week., Disp: , Rfl:     pravastatin (PRAVACHOL) 20 MG tablet, TAKE ONE (1) TABLET BY MOUTH EVERY DAY, Disp: 90 tablet, Rfl: 3    promethazine (PHENERGAN) 25 MG tablet, Take 1 tablet by mouth every 6 (six) to 8 (eight) hours as needed for Nausea., Disp: , Rfl:     rOPINIRole (REQUIP) 0.5 MG tablet, Take 1 tablet by mouth Every Night., Disp: , Rfl:     sotalol (BETAPACE) 80 MG tablet, TAKE ONE (1) TABLET BY MOUTH TWICE DAILY, Disp: 180 tablet, Rfl: 3    spironolactone (ALDACTONE) 25 MG tablet, TAKE ONE (1) TABLET BY MOUTH EVERY DAY, Disp: 30 tablet, Rfl: 0    SUMAtriptan (IMITREX) 100 MG tablet, Take 1 tablet by mouth Every 2 (Two) Hours As Needed for Migraine. Take one tablet at onset of headache. May repeat dose one time in 2 hours if headache not relieved., Disp: , Rfl:     vitamin B-12 (CYANOCOBALAMIN) 1000 MCG tablet, Take 1 tablet by mouth Daily., Disp: , Rfl:           ROS  ROS      SOCIAL HX  Social History     Socioeconomic History    Marital status: Legally     Number of children: 2   Tobacco Use    Smoking status: Every Day     Current packs/day: 1.50     Average packs/day: 1.5 packs/day for 30.0 years (45.0 ttl pk-yrs)     Types: Cigarettes     Smokeless tobacco: Never   Vaping Use    Vaping status: Never Used   Substance and Sexual Activity    Alcohol use: No    Drug use: No    Sexual activity: Defer       FAMILY HX  Family History   Problem Relation Age of Onset    Heart disease Mother     Breast cancer Mother     Alzheimer's disease Mother     Heart disease Father     Heart attack Sister     Heart disease Sister     No Known Problems Brother     Colon cancer Maternal Grandmother     Heart disease Maternal Grandfather     Heart disease Paternal Grandfather              Brandt Valdivia III, MD, FACC

## 2025-07-08 ENCOUNTER — TELEPHONE (OUTPATIENT)
Dept: CARDIOLOGY | Facility: CLINIC | Age: 50
End: 2025-07-08
Payer: COMMERCIAL

## 2025-07-08 NOTE — TELEPHONE ENCOUNTER
Reports the Metoprolol Succinate 25 mg is causing her to be lightheaded and b/p to be low. Started the medication yesterday.Asked if she had B/p readings to report. She is going to call her Mother for readings.Also told her that it would take several days for her to get use to the new medication. Awaiting her return call with B/p readings.

## 2025-07-23 RX ORDER — LANOLIN ALCOHOL/MO/W.PET/CERES
1 CREAM (GRAM) TOPICAL DAILY
Qty: 90 TABLET | Refills: 3 | Status: SHIPPED | OUTPATIENT
Start: 2025-07-23

## 2025-07-23 RX ORDER — SPIRONOLACTONE 25 MG/1
25 TABLET ORAL DAILY
Qty: 30 TABLET | Refills: 5 | Status: SHIPPED | OUTPATIENT
Start: 2025-07-23

## 2025-07-29 LAB
MC_CV_MDC_IDC_RATE_1: 160
MC_CV_MDC_IDC_RATE_1: 182
MC_CV_MDC_IDC_RATE_1: 200
MC_CV_MDC_IDC_SHOCK_MEASURED_IMPEDANCE: 72
MC_CV_MDC_IDC_THERAPIES: NORMAL
MC_CV_MDC_IDC_ZONE_ID: 10
MC_CV_MDC_IDC_ZONE_ID: 7
MC_CV_MDC_IDC_ZONE_ID: 8
MC_CV_MDC_IDC_ZONE_ID: 9
MDC_IDC_MSMT_BATTERY_REMAINING_PERCENTAGE: 53 %
MDC_IDC_MSMT_BATTERY_RRT_TRIGGER: 2.85
MDC_IDC_MSMT_BATTERY_STATUS: NORMAL
MDC_IDC_MSMT_BATTERY_VOLTAGE: 3.09
MDC_IDC_MSMT_CAP_CHARGE_TIME: 10.6
MDC_IDC_MSMT_LEADCHNL_RA_DTM: NORMAL
MDC_IDC_MSMT_LEADCHNL_RA_SENSING_INTR_AMPL: 4.4
MDC_IDC_MSMT_LEADCHNL_RV_DTM: NORMAL
MDC_IDC_MSMT_LEADCHNL_RV_IMPEDANCE_VALUE: 565
MDC_IDC_MSMT_LEADCHNL_RV_SENSING_INTR_AMPL: 20
MDC_IDC_PG_IMPLANT_DTM: NORMAL
MDC_IDC_PG_MFG: NORMAL
MDC_IDC_PG_MODEL: NORMAL
MDC_IDC_PG_SERIAL: NORMAL
MDC_IDC_PG_TYPE: NORMAL
MDC_IDC_SESS_DTM: NORMAL
MDC_IDC_SESS_TYPE: NORMAL
MDC_IDC_SET_BRADY_LOWRATE: 40
MDC_IDC_SET_BRADY_MODE: NORMAL
MDC_IDC_SET_LEADCHNL_RA_SENSING_POLARITY: NORMAL
MDC_IDC_SET_LEADCHNL_RA_SENSING_SENSITIVITY: 1
MDC_IDC_SET_LEADCHNL_RV_PACING_AMPLITUDE: 2
MDC_IDC_SET_LEADCHNL_RV_PACING_POLARITY: NORMAL
MDC_IDC_SET_LEADCHNL_RV_PACING_PULSEWIDTH: 0.4
MDC_IDC_SET_LEADCHNL_RV_SENSING_POLARITY: NORMAL
MDC_IDC_SET_LEADCHNL_RV_SENSING_SENSITIVITY: 0.8
MDC_IDC_SET_ZONE_STATUS: NORMAL
MDC_IDC_SET_ZONE_TYPE: NORMAL
MDC_IDC_STAT_AT_BURDEN_PERCENT: 0
MDC_IDC_STAT_BRADY_RV_PERCENT_PACED: 0
MDC_IDC_STAT_TACHYTHERAPY_ATP_DELIVERED_RECENT: 0
MDC_IDC_STAT_TACHYTHERAPY_SHOCKS_ABORTED_RECENT: 0
MDC_IDC_STAT_TACHYTHERAPY_SHOCKS_DELIVERED_RECENT: 0

## 2025-08-05 ENCOUNTER — TELEPHONE (OUTPATIENT)
Dept: CARDIOLOGY | Facility: CLINIC | Age: 50
End: 2025-08-05
Payer: COMMERCIAL

## 2025-08-11 RX ORDER — ALIROCUMAB 75 MG/ML
INJECTION, SOLUTION SUBCUTANEOUS
Qty: 6 ML | Refills: 0 | Status: SHIPPED | OUTPATIENT
Start: 2025-08-11

## 2025-08-12 ENCOUNTER — SPECIALTY PHARMACY (OUTPATIENT)
Dept: CARDIOLOGY | Facility: CLINIC | Age: 50
End: 2025-08-12
Payer: COMMERCIAL

## 2025-08-12 ENCOUNTER — TELEPHONE (OUTPATIENT)
Dept: CARDIOLOGY | Facility: CLINIC | Age: 50
End: 2025-08-12
Payer: COMMERCIAL

## (undated) DEVICE — ADULT, W/LG. BACK PAD, RADIOTRANSPARENT ELEMENT AND LEAD WIRE: Brand: DEFIBRILLATION ELECTRODES

## (undated) DEVICE — CAUTERY TIP POLISHER: Brand: DEVON

## (undated) DEVICE — PINNACLE INTRODUCER SHEATH: Brand: PINNACLE

## (undated) DEVICE — ST EXT IV SMARTSITE 2VLV SP M LL 5ML IV1

## (undated) DEVICE — PENCL E/S HNDSWCH ROCKRBTN HOLSTR 10FT

## (undated) DEVICE — CATH DIAG EXPO M/ PK 6FR FL4/FR4 PIG 3PK

## (undated) DEVICE — TUBING, SUCTION, 1/4" X 10', STRAIGHT: Brand: MEDLINE

## (undated) DEVICE — DEV INFL MONARCH 25W

## (undated) DEVICE — IRRIGATOR BULB ASEPTO 60CC STRL

## (undated) DEVICE — NC TREK CORONARY DILATATION CATHETER 3.0 MM X 20 MM / RAPID-EXCHANGE: Brand: NC TREK

## (undated) DEVICE — ST INF PRI SMRTSTE 20DRP 2VLV 24ML 117

## (undated) DEVICE — DRSNG SURG AQUACEL AG 9X15CM

## (undated) DEVICE — INTRO TEAR AWAY/LVD W/SD PRT 8F 13CM

## (undated) DEVICE — DECANTER: Brand: UNBRANDED

## (undated) DEVICE — MEDI-VAC YANKAUER SUCTION HANDLE W/BULBOUS TIP: Brand: CARDINAL HEALTH

## (undated) DEVICE — SOL NACL 0.9PCT 1000ML

## (undated) DEVICE — ARM SLING II: Brand: DEROYAL

## (undated) DEVICE — SET PRIMARY GRVTY 10DP MALE LL 104IN

## (undated) DEVICE — PROXIMATE RH ROTATING HEAD SKIN STAPLERS (35 WIDE) CONTAINS 35 STAINLESS STEEL STAPLES: Brand: PROXIMATE

## (undated) DEVICE — GUIDELINER CATHETERS ARE INTENDED TO BE USED IN CONJUNCTION WITH GUIDE CATHETERS TO ACCESS DISCRETE REGIONS OF THE CORONARY AND/OR PERIPHERAL VASCULATURE, AND TO FACILITATE PLACEMENT OF INTERVENTIONAL DEVICES.: Brand: GUIDELINER® V3 CATHETER

## (undated) DEVICE — SOL NS 500ML

## (undated) DEVICE — GW J TP FIX CORE .035 150

## (undated) DEVICE — HI-TORQUE PILOT 150 GUIDE WIRE .014 STRAIGHT TIP 3.0 CM X 190 CM: Brand: HI-TORQUE PILOT

## (undated) DEVICE — MINI TREK CORONARY DILATATION CATHETER 2.0 MM X 12 MM / RAPID-EXCHANGE: Brand: MINI TREK

## (undated) DEVICE — KT MANIFOLD CATHLAB CUST

## (undated) DEVICE — GUIDE CATHETER: Brand: MACH1™

## (undated) DEVICE — CANN NASL CO2 DIVIDED A/

## (undated) DEVICE — MAGNETIC DRAPE: Brand: DEVON

## (undated) DEVICE — LEX ELECTRO PHYSIOLOGY: Brand: MEDLINE INDUSTRIES, INC.

## (undated) DEVICE — DECANT BG O JET

## (undated) DEVICE — PK CATH CARD 10